# Patient Record
Sex: FEMALE | Race: OTHER | HISPANIC OR LATINO | Employment: UNEMPLOYED | URBAN - METROPOLITAN AREA
[De-identification: names, ages, dates, MRNs, and addresses within clinical notes are randomized per-mention and may not be internally consistent; named-entity substitution may affect disease eponyms.]

---

## 2017-12-22 ENCOUNTER — APPOINTMENT (OUTPATIENT)
Dept: LAB | Facility: HOSPITAL | Age: 37
End: 2017-12-22

## 2017-12-22 ENCOUNTER — GENERIC CONVERSION - ENCOUNTER (OUTPATIENT)
Dept: OTHER | Facility: OTHER | Age: 37
End: 2017-12-22

## 2017-12-22 ENCOUNTER — ALLSCRIPTS OFFICE VISIT (OUTPATIENT)
Dept: OTHER | Facility: OTHER | Age: 37
End: 2017-12-22

## 2017-12-22 DIAGNOSIS — Z34.91 ENCOUNTER FOR SUPERVISION OF NORMAL PREGNANCY IN FIRST TRIMESTER: ICD-10-CM

## 2017-12-22 DIAGNOSIS — O09.519 SUPERVISION OF ELDERLY PRIMIGRAVIDA: ICD-10-CM

## 2017-12-22 LAB
ABO GROUP BLD: NORMAL
BASOPHILS # BLD AUTO: 0.04 THOUSANDS/ΜL (ref 0–0.1)
BASOPHILS NFR BLD AUTO: 1 % (ref 0–1)
BILIRUB UR QL STRIP: NEGATIVE
BLD GP AB SCN SERPL QL: NEGATIVE
CLARITY UR: CLEAR
COLOR UR: NORMAL
EOSINOPHIL # BLD AUTO: 0.19 THOUSAND/ΜL (ref 0–0.61)
EOSINOPHIL NFR BLD AUTO: 3 % (ref 0–6)
ERYTHROCYTE [DISTWIDTH] IN BLOOD BY AUTOMATED COUNT: 13.1 % (ref 11.6–15.1)
GLUCOSE UR STRIP-MCNC: NEGATIVE MG/DL
HCT VFR BLD AUTO: 38.5 % (ref 34.8–46.1)
HGB BLD-MCNC: 13.5 G/DL (ref 11.5–15.4)
HGB UR QL STRIP.AUTO: NEGATIVE
KETONES UR STRIP-MCNC: NEGATIVE MG/DL
LEUKOCYTE ESTERASE UR QL STRIP: NEGATIVE
LYMPHOCYTES # BLD AUTO: 1.63 THOUSANDS/ΜL (ref 0.6–4.47)
LYMPHOCYTES NFR BLD AUTO: 21 % (ref 14–44)
MCH RBC QN AUTO: 31.8 PG (ref 26.8–34.3)
MCHC RBC AUTO-ENTMCNC: 35.1 G/DL (ref 31.4–37.4)
MCV RBC AUTO: 91 FL (ref 82–98)
MONOCYTES # BLD AUTO: 0.63 THOUSAND/ΜL (ref 0.17–1.22)
MONOCYTES NFR BLD AUTO: 8 % (ref 4–12)
NEUTROPHILS # BLD AUTO: 5.15 THOUSANDS/ΜL (ref 1.85–7.62)
NEUTS SEG NFR BLD AUTO: 67 % (ref 43–75)
NITRITE UR QL STRIP: NEGATIVE
NRBC BLD AUTO-RTO: 0 /100 WBCS
PH UR STRIP.AUTO: 5.5 [PH] (ref 4.5–8)
PLATELET # BLD AUTO: 237 THOUSANDS/UL (ref 149–390)
PMV BLD AUTO: 11.1 FL (ref 8.9–12.7)
PROT UR STRIP-MCNC: NEGATIVE MG/DL
RBC # BLD AUTO: 4.24 MILLION/UL (ref 3.81–5.12)
RH BLD: NEGATIVE
SP GR UR STRIP.AUTO: <=1.005 (ref 1–1.03)
SPECIMEN EXPIRATION DATE: NORMAL
UROBILINOGEN UR QL STRIP.AUTO: 0.2 E.U./DL
WBC # BLD AUTO: 7.64 THOUSAND/UL (ref 4.31–10.16)

## 2017-12-22 PROCEDURE — 80081 OBSTETRIC PANEL INC HIV TSTG: CPT

## 2017-12-22 PROCEDURE — 81003 URINALYSIS AUTO W/O SCOPE: CPT

## 2017-12-22 PROCEDURE — 87086 URINE CULTURE/COLONY COUNT: CPT

## 2017-12-22 PROCEDURE — 36415 COLL VENOUS BLD VENIPUNCTURE: CPT

## 2017-12-23 LAB
BACTERIA UR CULT: NORMAL
HBV SURFACE AG SER QL: NORMAL
RUBV IGG SERPL IA-ACNC: >175 IU/ML

## 2017-12-26 LAB — RPR SER QL: NORMAL

## 2017-12-27 LAB — HIV 1+2 AB+HIV1 P24 AG SERPL QL IA: NORMAL

## 2017-12-28 ENCOUNTER — LAB REQUISITION (OUTPATIENT)
Dept: LAB | Facility: HOSPITAL | Age: 37
End: 2017-12-28

## 2017-12-28 ENCOUNTER — GENERIC CONVERSION - ENCOUNTER (OUTPATIENT)
Dept: OTHER | Facility: OTHER | Age: 37
End: 2017-12-28

## 2017-12-28 DIAGNOSIS — Z11.3 ENCOUNTER FOR SCREENING FOR INFECTIONS WITH PREDOMINANTLY SEXUAL MODE OF TRANSMISSION: ICD-10-CM

## 2017-12-28 DIAGNOSIS — Z01.419 ENCOUNTER FOR GYNECOLOGICAL EXAMINATION WITHOUT ABNORMAL FINDING: ICD-10-CM

## 2017-12-28 PROCEDURE — 87624 HPV HI-RISK TYP POOLED RSLT: CPT | Performed by: OBSTETRICS & GYNECOLOGY

## 2017-12-28 PROCEDURE — 87591 N.GONORRHOEAE DNA AMP PROB: CPT | Performed by: OBSTETRICS & GYNECOLOGY

## 2017-12-28 PROCEDURE — 87491 CHLMYD TRACH DNA AMP PROBE: CPT | Performed by: OBSTETRICS & GYNECOLOGY

## 2017-12-28 PROCEDURE — G0145 SCR C/V CYTO,THINLAYER,RESCR: HCPCS | Performed by: OBSTETRICS & GYNECOLOGY

## 2018-01-02 LAB — HPV RRNA GENITAL QL NAA+PROBE: ABNORMAL

## 2018-01-03 LAB
CHLAMYDIA DNA CVX QL NAA+PROBE: NORMAL
N GONORRHOEA DNA GENITAL QL NAA+PROBE: NORMAL

## 2018-01-04 LAB
LAB AP GYN PRIMARY INTERPRETATION: NORMAL
LAB AP LMP: NORMAL
Lab: NORMAL
PATH INTERP SPEC-IMP: NORMAL

## 2018-01-08 ENCOUNTER — GENERIC CONVERSION - ENCOUNTER (OUTPATIENT)
Dept: OTHER | Facility: OTHER | Age: 38
End: 2018-01-08

## 2018-01-08 ENCOUNTER — ALLSCRIPTS OFFICE VISIT (OUTPATIENT)
Dept: PERINATAL CARE | Facility: CLINIC | Age: 38
End: 2018-01-08
Payer: COMMERCIAL

## 2018-01-08 PROCEDURE — 76801 OB US < 14 WKS SINGLE FETUS: CPT | Performed by: OBSTETRICS & GYNECOLOGY

## 2018-01-08 PROCEDURE — 76813 OB US NUCHAL MEAS 1 GEST: CPT | Performed by: OBSTETRICS & GYNECOLOGY

## 2018-01-10 ENCOUNTER — GENERIC CONVERSION - ENCOUNTER (OUTPATIENT)
Dept: OTHER | Facility: OTHER | Age: 38
End: 2018-01-10

## 2018-01-10 PROCEDURE — 88305 TISSUE EXAM BY PATHOLOGIST: CPT | Performed by: OBSTETRICS & GYNECOLOGY

## 2018-01-11 ENCOUNTER — LAB REQUISITION (OUTPATIENT)
Dept: LAB | Facility: HOSPITAL | Age: 38
End: 2018-01-11
Payer: COMMERCIAL

## 2018-01-11 DIAGNOSIS — O09.519 SUPERVISION OF ELDERLY PRIMIGRAVIDA: ICD-10-CM

## 2018-01-23 VITALS
BODY MASS INDEX: 24.64 KG/M2 | DIASTOLIC BLOOD PRESSURE: 74 MMHG | SYSTOLIC BLOOD PRESSURE: 117 MMHG | HEIGHT: 71 IN | WEIGHT: 176 LBS

## 2018-01-23 NOTE — MISCELLANEOUS
Reason For Visit  Reason For Visit Free Text Note Form: SW SPOKE WITH 38 Y/O- M- G1- BILINGUAL WOMAN TO ADDRESS DEPRESSION SCORE OF 20 WITH NO HARM TO SELF  PATIENT REPORTED SHE RELOCATED FROM [place], [facility]  RESIDES WITH  AND IS MISSING HER FAMILY  PREGNANCY WAS NOT REALLY PLAN BUT BOTH VERY EXCITED  PATIENT REPORTED SHE FEES OVERWHELMED BY NEW HOUSE, RELOCATION, NEW PLACE, IMMIGRATION PROCESS, HAS NO FAMILY HERE  PATIENT'S SISTER HERE FOR A MONTH  PATIENT DENIES ANY PRIOR HISTORY OF DEPRESSION  NOT SI / HI  SUPPORTIVE COUNSELING GIVEN  NO MENTAL HEALTH REFERRAL AT THIS TIME  PATIENT WAS ENCOURAGED TO CALL SW AT ANY TIME NEEDED  NO OTHER CONCERN AT THIS TIME  Active Problems    1  Encounter for pregnancy related examination in first trimester (V22 1) (Z34 91)   2  Need for influenza vaccination (V04 81) (Z23)    Current Meds   1  PNV Prenatal Plus Multivitamin 27-1 MG Oral Tablet; Therapy: (Recorded:[id number]) to Recorded    Allergies    1  No Known Drug Allergies    2  Dust   3   No Known Food Allergies    Signatures   Electronically signed by : JOHN Ford; Dec 22 2017 12:34PM EST                       (Author)

## 2018-01-24 ENCOUNTER — OB ABSTRACT (OUTPATIENT)
Dept: OBGYN CLINIC | Facility: HOSPITAL | Age: 38
End: 2018-01-24

## 2018-01-24 ENCOUNTER — OFFICE VISIT (OUTPATIENT)
Dept: OBGYN CLINIC | Facility: HOSPITAL | Age: 38
End: 2018-01-24
Payer: COMMERCIAL

## 2018-01-24 VITALS
WEIGHT: 173.6 LBS | BODY MASS INDEX: 24.21 KG/M2 | DIASTOLIC BLOOD PRESSURE: 74 MMHG | SYSTOLIC BLOOD PRESSURE: 108 MMHG | HEART RATE: 82 BPM

## 2018-01-24 VITALS
BODY MASS INDEX: 24.22 KG/M2 | SYSTOLIC BLOOD PRESSURE: 123 MMHG | DIASTOLIC BLOOD PRESSURE: 68 MMHG | HEIGHT: 71 IN | WEIGHT: 173 LBS

## 2018-01-24 VITALS
DIASTOLIC BLOOD PRESSURE: 74 MMHG | BODY MASS INDEX: 24.5 KG/M2 | SYSTOLIC BLOOD PRESSURE: 122 MMHG | WEIGHT: 175 LBS | HEIGHT: 71 IN | HEART RATE: 77 BPM

## 2018-01-24 VITALS
HEART RATE: 89 BPM | HEIGHT: 71 IN | WEIGHT: 173.8 LBS | DIASTOLIC BLOOD PRESSURE: 72 MMHG | BODY MASS INDEX: 24.33 KG/M2 | SYSTOLIC BLOOD PRESSURE: 109 MMHG

## 2018-01-24 DIAGNOSIS — Z34.02 ENCOUNTER FOR PRENATAL CARE IN SECOND TRIMESTER OF FIRST PREGNANCY: Primary | ICD-10-CM

## 2018-01-24 PROBLEM — O09.519 ADVANCED MATERNAL AGE, PRIMIGRAVIDA, ANTEPARTUM: Status: ACTIVE | Noted: 2018-01-24

## 2018-01-24 PROBLEM — O26.899 RH NEGATIVE, ANTEPARTUM: Status: ACTIVE | Noted: 2018-01-24

## 2018-01-24 PROBLEM — O21.9 NAUSEA AND VOMITING DURING PREGNANCY: Status: ACTIVE | Noted: 2018-01-24

## 2018-01-24 PROBLEM — Z67.91 RH NEGATIVE, ANTEPARTUM: Status: ACTIVE | Noted: 2018-01-24

## 2018-01-24 PROCEDURE — 99212 OFFICE O/P EST SF 10 MIN: CPT | Performed by: OBSTETRICS & GYNECOLOGY

## 2018-01-24 RX ORDER — PRENATAL WITH FERROUS FUM AND FOLIC ACID 3080; 920; 120; 400; 22; 1.84; 3; 20; 10; 1; 12; 200; 27; 25; 2 [IU]/1; [IU]/1; MG/1; [IU]/1; MG/1; MG/1; MG/1; MG/1; MG/1; MG/1; UG/1; MG/1; MG/1; MG/1; MG/1
TABLET ORAL
COMMUNITY
End: 2019-09-09

## 2018-01-24 RX ORDER — DIPHENHYDRAMINE HYDROCHLORIDE 25 MG/1
25 CAPSULE ORAL DAILY
COMMUNITY
End: 2018-01-24 | Stop reason: SDUPTHER

## 2018-01-24 RX ORDER — DIPHENHYDRAMINE HYDROCHLORIDE 25 MG/1
CAPSULE ORAL
COMMUNITY
Start: 2017-12-28

## 2018-01-24 RX ORDER — PRENATAL WITH FERROUS FUM AND FOLIC ACID 3080; 920; 120; 400; 22; 1.84; 3; 20; 10; 1; 12; 200; 27; 25; 2 [IU]/1; [IU]/1; MG/1; [IU]/1; MG/1; MG/1; MG/1; MG/1; MG/1; MG/1; UG/1; MG/1; MG/1; MG/1; MG/1
TABLET ORAL
COMMUNITY
End: 2018-01-24 | Stop reason: SDUPTHER

## 2018-01-24 NOTE — PROGRESS NOTES
75-year-old  at 15 weeks and 0 days today here for prenatal visit, she has been evaluated for about culture null H at the  center, she was offered the option of  Maternal serum alpha-fetoprotein for detection of 2 defects since cell free DNA cannot screen for these problems, the patient also requesting SMA and cystic fibrosis screening  Patient states that now she has insurance and it will be covered  Also as part of her workup for a mat maternal age early Glucola is recommended for which is slip was provided  patient denies any vaginal bleeding, loss of fluid, contractions  Patient is from [place] and she mentions that she will like to have use 1 provider during her pregnancy I explained that here in the [place] that is difficult to offer since larger groups are the norm, but recommend that Dr Arlette Ernandez  As an option since he has a solo OBGYN  Patient to information and will consider switching to his practice  /74 (BP Location: Left arm, Patient Position: Sitting, Cuff Size: Standard)   Pulse 82   Wt 78 7 kg (173 lb 9 6 oz)   LMP 10/11/2017   BMI 24 21 kg/m²       Fetal heart rate: 142  By abdominal ultrasound  Active fetus        Plan:  Follow-up SMA and cystic fibrosis screening,   return to clinic in 4 weeks

## 2018-01-25 ENCOUNTER — TRANSCRIBE ORDERS (OUTPATIENT)
Dept: LAB | Facility: HOSPITAL | Age: 38
End: 2018-01-25

## 2018-01-25 ENCOUNTER — APPOINTMENT (OUTPATIENT)
Dept: LAB | Facility: HOSPITAL | Age: 38
End: 2018-01-25
Payer: COMMERCIAL

## 2018-01-25 DIAGNOSIS — Z34.02 ENCOUNTER FOR PRENATAL CARE IN SECOND TRIMESTER OF FIRST PREGNANCY: ICD-10-CM

## 2018-01-25 DIAGNOSIS — Z34.02 ENCOUNTER FOR SUPERVISION OF NORMAL FIRST PREGNANCY IN SECOND TRIMESTER: Primary | ICD-10-CM

## 2018-01-25 LAB — GLUCOSE 1H P 100 G GLC PO SERPL-MCNC: 96 MG/DL (ref 65–179)

## 2018-01-25 PROCEDURE — 81401 MOPATH PROCEDURE LEVEL 2: CPT

## 2018-01-25 PROCEDURE — 81243 FMR1 GEN ALY DETC ABNL ALLEL: CPT

## 2018-01-25 PROCEDURE — 36415 COLL VENOUS BLD VENIPUNCTURE: CPT | Performed by: OBSTETRICS & GYNECOLOGY

## 2018-01-25 PROCEDURE — 81220 CFTR GENE COM VARIANTS: CPT | Performed by: OBSTETRICS & GYNECOLOGY

## 2018-01-25 NOTE — PROGRESS NOTES
I have reviewed the notes, assessments, and/or procedures performed by Daisy Oconnell, I concur with her/his documentation of Lexx Miranda

## 2018-02-05 LAB — MISCELLANEOUS LAB TEST RESULT: NORMAL

## 2018-02-06 ENCOUNTER — OB ABSTRACT (OUTPATIENT)
Dept: OBGYN CLINIC | Facility: CLINIC | Age: 38
End: 2018-02-06

## 2018-03-01 ENCOUNTER — ROUTINE PRENATAL (OUTPATIENT)
Dept: PERINATAL CARE | Facility: CLINIC | Age: 38
End: 2018-03-01
Payer: COMMERCIAL

## 2018-03-01 VITALS
HEART RATE: 85 BPM | BODY MASS INDEX: 25.06 KG/M2 | SYSTOLIC BLOOD PRESSURE: 94 MMHG | HEIGHT: 71 IN | DIASTOLIC BLOOD PRESSURE: 66 MMHG | WEIGHT: 179 LBS

## 2018-03-01 DIAGNOSIS — O09.512 ELDERLY PRIMIGRAVIDA IN SECOND TRIMESTER: ICD-10-CM

## 2018-03-01 DIAGNOSIS — Z36.3 ENCOUNTER FOR ANTENATAL SCREENING FOR MALFORMATIONS: Primary | ICD-10-CM

## 2018-03-01 DIAGNOSIS — Z34.02 ENCOUNTER FOR PRENATAL CARE OF FIRST PREGNANCY, SECOND TRIMESTER: ICD-10-CM

## 2018-03-01 DIAGNOSIS — Z36.86 ENCOUNTER FOR ANTENATAL SCREENING FOR CERVICAL LENGTH: ICD-10-CM

## 2018-03-01 DIAGNOSIS — Z3A.20 20 WEEKS GESTATION OF PREGNANCY: ICD-10-CM

## 2018-03-01 PROCEDURE — 76817 TRANSVAGINAL US OBSTETRIC: CPT | Performed by: OBSTETRICS & GYNECOLOGY

## 2018-03-01 PROCEDURE — 76811 OB US DETAILED SNGL FETUS: CPT | Performed by: OBSTETRICS & GYNECOLOGY

## 2018-03-01 PROCEDURE — 99212 OFFICE O/P EST SF 10 MIN: CPT | Performed by: OBSTETRICS & GYNECOLOGY

## 2018-03-01 RX ORDER — FOLIC ACID 1 MG/1
1 TABLET ORAL DAILY
COMMUNITY
End: 2019-08-19 | Stop reason: HOSPADM

## 2018-03-01 NOTE — PATIENT INSTRUCTIONS
Please return in 8-12 weeks to check fetal growth  Nonstress testing should begin at 36 weeks and continue until delivery

## 2018-03-01 NOTE — PROGRESS NOTES
RANJIT Barrios 51: Ms Steven Castillo was seen today at 20w1d for anatomic survey and cervical length screening ultrasound  See ultrasound report under "OB Procedures" tab  Please don't hesitate to contact our office with any concerns or questions    Madison Sanches MD

## 2018-03-07 NOTE — PROGRESS NOTES
Education  Baby & Me Education 1st Trimester:   First Trimester Education provided:   benefits of breastfeeding, importance of exclusive breastfeeding, early initiation of breastfeeding, exclusive breastfeeding for the first 6 months and Pregnancy Essentials Reference Guide given   The patient is planning on breastfeeding        Signatures   Electronically signed by : Freida Tolentino RN; Dec 22 2017 12:17PM EST                       (Author)

## 2018-03-15 ENCOUNTER — TRANSITIONAL CARE MANAGEMENT (OUTPATIENT)
Dept: PERINATAL CARE | Facility: CLINIC | Age: 38
End: 2018-03-15

## 2018-03-16 ENCOUNTER — TELEPHONE (OUTPATIENT)
Dept: PERINATAL CARE | Facility: CLINIC | Age: 38
End: 2018-03-16

## 2018-05-04 ENCOUNTER — TRANSCRIBE ORDERS (OUTPATIENT)
Dept: LAB | Facility: HOSPITAL | Age: 38
End: 2018-05-04

## 2018-05-04 ENCOUNTER — LAB (OUTPATIENT)
Dept: LAB | Facility: HOSPITAL | Age: 38
End: 2018-05-04
Attending: OBSTETRICS & GYNECOLOGY
Payer: COMMERCIAL

## 2018-05-04 DIAGNOSIS — Z36.89 SCREENING FOR PREGNANCY-ASSOCIATED PLASMA PROTEIN A: ICD-10-CM

## 2018-05-04 DIAGNOSIS — Z36.89 SCREENING FOR PREGNANCY-ASSOCIATED PLASMA PROTEIN A: Primary | ICD-10-CM

## 2018-05-04 DIAGNOSIS — O09.519 SUPERVISION OF ELDERLY PRIMIGRAVIDA: ICD-10-CM

## 2018-05-04 LAB
BASOPHILS # BLD AUTO: 0.02 THOUSANDS/ΜL (ref 0–0.1)
BASOPHILS NFR BLD AUTO: 0 % (ref 0–1)
EOSINOPHIL # BLD AUTO: 0.12 THOUSAND/ΜL (ref 0–0.61)
EOSINOPHIL NFR BLD AUTO: 2 % (ref 0–6)
ERYTHROCYTE [DISTWIDTH] IN BLOOD BY AUTOMATED COUNT: 13.3 % (ref 11.6–15.1)
GLUCOSE 1H P 100 G GLC PO SERPL-MCNC: 87 MG/DL (ref 65–179)
HCT VFR BLD AUTO: 36.1 % (ref 34.8–46.1)
HGB BLD-MCNC: 11.9 G/DL (ref 11.5–15.4)
LYMPHOCYTES # BLD AUTO: 1.37 THOUSANDS/ΜL (ref 0.6–4.47)
LYMPHOCYTES NFR BLD AUTO: 17 % (ref 14–44)
MCH RBC QN AUTO: 30.8 PG (ref 26.8–34.3)
MCHC RBC AUTO-ENTMCNC: 33 G/DL (ref 31.4–37.4)
MCV RBC AUTO: 94 FL (ref 82–98)
MONOCYTES # BLD AUTO: 0.68 THOUSAND/ΜL (ref 0.17–1.22)
MONOCYTES NFR BLD AUTO: 8 % (ref 4–12)
NEUTROPHILS # BLD AUTO: 6.02 THOUSANDS/ΜL (ref 1.85–7.62)
NEUTS SEG NFR BLD AUTO: 73 % (ref 43–75)
NRBC BLD AUTO-RTO: 0 /100 WBCS
PLATELET # BLD AUTO: 251 THOUSANDS/UL (ref 149–390)
PMV BLD AUTO: 10 FL (ref 8.9–12.7)
RBC # BLD AUTO: 3.86 MILLION/UL (ref 3.81–5.12)
WBC # BLD AUTO: 8.23 THOUSAND/UL (ref 4.31–10.16)

## 2018-05-04 PROCEDURE — 81220 CFTR GENE COM VARIANTS: CPT

## 2018-05-04 PROCEDURE — 85025 COMPLETE CBC W/AUTO DIFF WBC: CPT

## 2018-05-04 PROCEDURE — 81401 MOPATH PROCEDURE LEVEL 2: CPT

## 2018-05-04 PROCEDURE — 36415 COLL VENOUS BLD VENIPUNCTURE: CPT

## 2018-05-11 LAB
CF COMMENT: NORMAL
CFTR MUT ANL BLD/T: NORMAL

## 2018-05-15 LAB
ANNOTATION COMMENT IMP: NORMAL
ETHNIC BACKGROUND STATED: NORMAL
REF LAB TEST METHOD: NORMAL
SL AMB CARRIER DETECTION RATE: NORMAL
SL AMB CLIENT SPECIMEN ID: NORMAL
SL AMB CLINICAL DATA: NORMAL
SL AMB DISCLAIMER: NORMAL
SL AMB ELECTONICALLY SIGNED BY: NORMAL
SL AMB GENETIC COUNSELOR: NORMAL
SL AMB REFERENCES: NORMAL
SL AMB SPECIMEN(S) RECEIVED: NORMAL
SMN1 GENE MUT ANL BLD/T: NORMAL
SMN1 GENE MUT ANL BLD/T: NORMAL
SPECIMEN SOURCE: NORMAL

## 2018-05-18 ENCOUNTER — TELEPHONE (OUTPATIENT)
Dept: PERINATAL CARE | Facility: CLINIC | Age: 38
End: 2018-05-18

## 2018-05-18 NOTE — TELEPHONE ENCOUNTER
Called patient with CF and SMA carrier screening test results  Both negative  Discussion was somewhat difficult given the language barrier but patient expressed understanding that the results were good

## 2018-05-31 PROCEDURE — 87653 STREP B DNA AMP PROBE: CPT | Performed by: OBSTETRICS & GYNECOLOGY

## 2018-06-01 ENCOUNTER — LAB REQUISITION (OUTPATIENT)
Dept: LAB | Facility: HOSPITAL | Age: 38
End: 2018-06-01
Payer: COMMERCIAL

## 2018-06-01 DIAGNOSIS — Z36.89 ENCOUNTER FOR OTHER SPECIFIED ANTENATAL SCREENING: ICD-10-CM

## 2018-06-01 DIAGNOSIS — Z36.85 ENCOUNTER FOR ANTENATAL SCREENING FOR STREPTOCOCCUS B: ICD-10-CM

## 2018-06-03 LAB — GP B STREP DNA SPEC QL NAA+PROBE: NORMAL

## 2018-06-15 ENCOUNTER — ANESTHESIA EVENT (INPATIENT)
Dept: LABOR AND DELIVERY | Facility: HOSPITAL | Age: 38
End: 2018-06-15
Payer: COMMERCIAL

## 2018-06-15 ENCOUNTER — ANESTHESIA (INPATIENT)
Dept: LABOR AND DELIVERY | Facility: HOSPITAL | Age: 38
End: 2018-06-15
Payer: COMMERCIAL

## 2018-06-15 ENCOUNTER — HOSPITAL ENCOUNTER (INPATIENT)
Facility: HOSPITAL | Age: 38
LOS: 3 days | Discharge: HOME | End: 2018-06-18
Attending: OBSTETRICS & GYNECOLOGY | Admitting: OBSTETRICS & GYNECOLOGY
Payer: COMMERCIAL

## 2018-06-15 DIAGNOSIS — Z3A.35 35 WEEKS GESTATION OF PREGNANCY: Primary | ICD-10-CM

## 2018-06-15 LAB
ABO GROUP BLD: NORMAL
ALBUMIN SERPL BCP-MCNC: 2.5 G/DL (ref 3.5–5)
ALP SERPL-CCNC: 110 U/L (ref 46–116)
ALT SERPL W P-5'-P-CCNC: 37 U/L (ref 12–78)
ANION GAP SERPL CALCULATED.3IONS-SCNC: 12 MMOL/L (ref 4–13)
AST SERPL W P-5'-P-CCNC: 27 U/L (ref 5–45)
BASOPHILS # BLD AUTO: 0.05 THOUSANDS/ΜL (ref 0–0.1)
BASOPHILS NFR BLD AUTO: 0 % (ref 0–1)
BILIRUB SERPL-MCNC: 0.27 MG/DL (ref 0.2–1)
BLD GP AB SCN SERPL QL: POSITIVE
BLOOD GROUP ANTIBODIES SERPL: NORMAL
BUN SERPL-MCNC: 7 MG/DL (ref 5–25)
CALCIUM SERPL-MCNC: 8.5 MG/DL (ref 8.3–10.1)
CHLORIDE SERPL-SCNC: 107 MMOL/L (ref 100–108)
CO2 SERPL-SCNC: 20 MMOL/L (ref 21–32)
CREAT SERPL-MCNC: 0.64 MG/DL (ref 0.6–1.3)
EOSINOPHIL # BLD AUTO: 0.01 THOUSAND/ΜL (ref 0–0.61)
EOSINOPHIL NFR BLD AUTO: 0 % (ref 0–6)
ERYTHROCYTE [DISTWIDTH] IN BLOOD BY AUTOMATED COUNT: 13 % (ref 11.6–15.1)
GFR SERPL CREATININE-BSD FRML MDRD: 112 ML/MIN/1.73SQ M
GLUCOSE SERPL-MCNC: 131 MG/DL (ref 65–140)
HCT VFR BLD AUTO: 39.2 % (ref 34.8–46.1)
HGB BLD-MCNC: 12.9 G/DL (ref 11.5–15.4)
IMM GRANULOCYTES # BLD AUTO: 0.08 THOUSAND/UL (ref 0–0.2)
IMM GRANULOCYTES NFR BLD AUTO: 1 % (ref 0–2)
LYMPHOCYTES # BLD AUTO: 1.13 THOUSANDS/ΜL (ref 0.6–4.47)
LYMPHOCYTES NFR BLD AUTO: 7 % (ref 14–44)
MCH RBC QN AUTO: 30.1 PG (ref 26.8–34.3)
MCHC RBC AUTO-ENTMCNC: 32.9 G/DL (ref 31.4–37.4)
MCV RBC AUTO: 91 FL (ref 82–98)
MONOCYTES # BLD AUTO: 0.62 THOUSAND/ΜL (ref 0.17–1.22)
MONOCYTES NFR BLD AUTO: 4 % (ref 4–12)
NEUTROPHILS # BLD AUTO: 14.68 THOUSANDS/ΜL (ref 1.85–7.62)
NEUTS SEG NFR BLD AUTO: 88 % (ref 43–75)
NRBC BLD AUTO-RTO: 0 /100 WBCS
PLATELET # BLD AUTO: 269 THOUSANDS/UL (ref 149–390)
PMV BLD AUTO: 10 FL (ref 8.9–12.7)
POTASSIUM SERPL-SCNC: 3.8 MMOL/L (ref 3.5–5.3)
PROT SERPL-MCNC: 6.7 G/DL (ref 6.4–8.2)
RBC # BLD AUTO: 4.29 MILLION/UL (ref 3.81–5.12)
RH BLD: NEGATIVE
SODIUM SERPL-SCNC: 139 MMOL/L (ref 136–145)
SPECIMEN EXPIRATION DATE: NORMAL
WBC # BLD AUTO: 16.57 THOUSAND/UL (ref 4.31–10.16)

## 2018-06-15 PROCEDURE — 86901 BLOOD TYPING SEROLOGIC RH(D): CPT | Performed by: OBSTETRICS & GYNECOLOGY

## 2018-06-15 PROCEDURE — 99205 OFFICE O/P NEW HI 60 MIN: CPT

## 2018-06-15 PROCEDURE — 86850 RBC ANTIBODY SCREEN: CPT | Performed by: OBSTETRICS & GYNECOLOGY

## 2018-06-15 PROCEDURE — 86592 SYPHILIS TEST NON-TREP QUAL: CPT | Performed by: OBSTETRICS & GYNECOLOGY

## 2018-06-15 PROCEDURE — 80053 COMPREHEN METABOLIC PANEL: CPT | Performed by: OBSTETRICS & GYNECOLOGY

## 2018-06-15 PROCEDURE — 86870 RBC ANTIBODY IDENTIFICATION: CPT | Performed by: OBSTETRICS & GYNECOLOGY

## 2018-06-15 PROCEDURE — 85025 COMPLETE CBC W/AUTO DIFF WBC: CPT | Performed by: OBSTETRICS & GYNECOLOGY

## 2018-06-15 PROCEDURE — 86900 BLOOD TYPING SEROLOGIC ABO: CPT | Performed by: OBSTETRICS & GYNECOLOGY

## 2018-06-15 RX ORDER — SODIUM CHLORIDE, SODIUM LACTATE, POTASSIUM CHLORIDE, CALCIUM CHLORIDE 600; 310; 30; 20 MG/100ML; MG/100ML; MG/100ML; MG/100ML
125 INJECTION, SOLUTION INTRAVENOUS CONTINUOUS
Status: DISCONTINUED | OUTPATIENT
Start: 2018-06-15 | End: 2018-06-18 | Stop reason: HOSPADM

## 2018-06-15 RX ORDER — SODIUM CHLORIDE 9 MG/ML
125 INJECTION, SOLUTION INTRAVENOUS CONTINUOUS
Status: DISCONTINUED | OUTPATIENT
Start: 2018-06-15 | End: 2018-06-16

## 2018-06-15 RX ORDER — PROMETHAZINE HYDROCHLORIDE 25 MG/ML
25 INJECTION, SOLUTION INTRAMUSCULAR; INTRAVENOUS ONCE
Status: COMPLETED | OUTPATIENT
Start: 2018-06-15 | End: 2018-06-15

## 2018-06-15 RX ORDER — BUTORPHANOL TARTRATE 1 MG/ML
2 INJECTION, SOLUTION INTRAMUSCULAR; INTRAVENOUS ONCE
Status: COMPLETED | OUTPATIENT
Start: 2018-06-15 | End: 2018-06-15

## 2018-06-15 RX ORDER — PROMETHAZINE HYDROCHLORIDE 25 MG/1
25 TABLET ORAL ONCE
Status: DISCONTINUED | OUTPATIENT
Start: 2018-06-15 | End: 2018-06-15

## 2018-06-15 RX ORDER — BETAMETHASONE SODIUM PHOSPHATE AND BETAMETHASONE ACETATE 3; 3 MG/ML; MG/ML
12 INJECTION, SUSPENSION INTRA-ARTICULAR; INTRALESIONAL; INTRAMUSCULAR; SOFT TISSUE EVERY 24 HOURS
Status: DISCONTINUED | OUTPATIENT
Start: 2018-06-15 | End: 2018-06-16

## 2018-06-15 RX ADMIN — SODIUM CHLORIDE, SODIUM LACTATE, POTASSIUM CHLORIDE, AND CALCIUM CHLORIDE 125 ML/HR: .6; .31; .03; .02 INJECTION, SOLUTION INTRAVENOUS at 19:45

## 2018-06-15 RX ADMIN — BETAMETHASONE SODIUM PHOSPHATE AND BETAMETHASONE ACETATE 12 MG: 3; 3 INJECTION, SUSPENSION INTRA-ARTICULAR; INTRALESIONAL; INTRAMUSCULAR at 16:28

## 2018-06-15 RX ADMIN — SODIUM CHLORIDE, SODIUM LACTATE, POTASSIUM CHLORIDE, AND CALCIUM CHLORIDE: .6; .31; .03; .02 INJECTION, SOLUTION INTRAVENOUS at 23:07

## 2018-06-15 RX ADMIN — BUTORPHANOL TARTRATE 2 MG: 1 INJECTION, SOLUTION INTRAMUSCULAR; INTRAVENOUS at 15:00

## 2018-06-15 RX ADMIN — SODIUM CHLORIDE 125 ML/HR: 0.9 INJECTION, SOLUTION INTRAVENOUS at 16:54

## 2018-06-15 RX ADMIN — PROMETHAZINE HYDROCHLORIDE 25 MG: 25 INJECTION INTRAMUSCULAR; INTRAVENOUS at 15:00

## 2018-06-15 NOTE — PROGRESS NOTES
Triage Note - OB  Abbie Rivera 34 y o  female MRN: [id number]  Unit/Bed#: LD Triage 1-01 Encounter: [id number]    OB TRIAGE NOTE  Abbie Rivera  [id number]  6/15/2018  3:31 PM  LD Triage 1/LD Triage 1-*    ASSESS:  34 y o  Bhupendra Irene 35w2d here labor workup    PLAN  #1  Rule out labor:   · Initial check: 3/100/-1, membranes palpable  · Two hour recheck: 4/100/-1  · Given slow change at 35w2d, will keep patient here for another 2 hour recheck  Will start course of BTM  D/w Dr Maximo Kline  ______________    SUBJECTIVE    ABISAI: Estimated Date of Delivery: 7/18/18    HPI Chronology:  34 y o  Bhupendra Irene 35w2d who presents for labor workup  She started having contractions at 0730 this morning which began intensifying around 0930  She reports that this is the first occurrence of painful contractions  She denies any other symptoms  She is GBS negative  No complications with this pregnancy aside from advanced maternal age  Contractions: Present  Leakage: Absent  Bleeding: Absent  Fetal Movement: Present  Pelvic pain: Present    Vitals:   Pulse 85   Temp (!) 97 1 °F (36 2 °C) (Tympanic)   Resp 18   LMP 10/11/2017   There is no height or weight on file to calculate BMI  Review of Systems   Constitutional: Negative  Respiratory: Negative  Cardiovascular: Negative  Gastrointestinal: Negative  Genitourinary: Positive for pelvic pain  Musculoskeletal: Negative  Physical Exam   Constitutional: She is oriented to person, place, and time  She appears well-developed and well-nourished  Cardiovascular: Normal rate, regular rhythm and normal heart sounds  Pulmonary/Chest: Effort normal and breath sounds normal    Abdominal: Soft  Bowel sounds are normal    Neurological: She is alert and oriented to person, place, and time  Vitals reviewed        FHT:  Baseline Rate: 135 bpm  Variability: Moderate 6-25 bpm  TOCO:   Contraction Frequency (minutes): irritable/ occasional  Contraction Quality: Moderate, Mild    Labs: No results found for this or any previous visit (from the past 24 hour(s))  Lab, Imaging and other studies: I have personally reviewed pertinent reports          Joe Palomo MD  6/15/2018  3:31 PM

## 2018-06-15 NOTE — PROGRESS NOTES
Still with some complaints of contractions but did rest after stadol and phenergan  FHT reassuring  Slow but obvious cervical changes - will continue to watch and anticipate admission   Start IV hydration and get admission labs  Supportive care

## 2018-06-16 LAB
BASE EXCESS BLDCOV CALC-SCNC: -3 MMOL/L (ref 1–9)
HCO3 BLDCOV-SCNC: 22.4 MMOL/L (ref 12.2–28.6)
OXYHGB MFR BLDCOV: 59.9 %
PCO2 BLDCOV: 41.5 MM HG (ref 27–43)
PH BLDCOV: 7.35 [PH] (ref 7.19–7.49)
PO2 BLDCOV: 23.7 MM HG (ref 15–45)
SAO2 % BLDCOV: 14.3 ML/DL

## 2018-06-16 PROCEDURE — 0HQ9XZZ REPAIR PERINEUM SKIN, EXTERNAL APPROACH: ICD-10-PCS | Performed by: OBSTETRICS & GYNECOLOGY

## 2018-06-16 PROCEDURE — 88307 TISSUE EXAM BY PATHOLOGIST: CPT | Performed by: PATHOLOGY

## 2018-06-16 PROCEDURE — 10D17Z9 MANUAL EXTRACTION OF PRODUCTS OF CONCEPTION, RETAINED, VIA NATURAL OR ARTIFICIAL OPENING: ICD-10-PCS | Performed by: OBSTETRICS & GYNECOLOGY

## 2018-06-16 PROCEDURE — 82805 BLOOD GASES W/O2 SATURATION: CPT | Performed by: OBSTETRICS & GYNECOLOGY

## 2018-06-16 RX ORDER — DOCUSATE SODIUM 100 MG/1
100 CAPSULE, LIQUID FILLED ORAL 2 TIMES DAILY
Status: DISCONTINUED | OUTPATIENT
Start: 2018-06-16 | End: 2018-06-18 | Stop reason: HOSPADM

## 2018-06-16 RX ORDER — IBUPROFEN 600 MG/1
600 TABLET ORAL EVERY 6 HOURS PRN
Status: DISCONTINUED | OUTPATIENT
Start: 2018-06-16 | End: 2018-06-18 | Stop reason: HOSPADM

## 2018-06-16 RX ORDER — SIMETHICONE 80 MG
80 TABLET,CHEWABLE ORAL 4 TIMES DAILY PRN
Status: DISCONTINUED | OUTPATIENT
Start: 2018-06-16 | End: 2018-06-18 | Stop reason: HOSPADM

## 2018-06-16 RX ORDER — OXYCODONE HYDROCHLORIDE AND ACETAMINOPHEN 5; 325 MG/1; MG/1
1 TABLET ORAL EVERY 4 HOURS PRN
Status: DISCONTINUED | OUTPATIENT
Start: 2018-06-16 | End: 2018-06-18 | Stop reason: HOSPADM

## 2018-06-16 RX ORDER — OXYTOCIN/RINGER'S LACTATE 30/500 ML
1-30 PLASTIC BAG, INJECTION (ML) INTRAVENOUS
Status: DISCONTINUED | OUTPATIENT
Start: 2018-06-16 | End: 2018-06-16

## 2018-06-16 RX ORDER — ONDANSETRON 2 MG/ML
4 INJECTION INTRAMUSCULAR; INTRAVENOUS EVERY 8 HOURS PRN
Status: DISCONTINUED | OUTPATIENT
Start: 2018-06-16 | End: 2018-06-18 | Stop reason: HOSPADM

## 2018-06-16 RX ORDER — DIAPER,BRIEF,INFANT-TODD,DISP
1 EACH MISCELLANEOUS AS NEEDED
Status: DISCONTINUED | OUTPATIENT
Start: 2018-06-16 | End: 2018-06-18 | Stop reason: HOSPADM

## 2018-06-16 RX ORDER — CALCIUM CARBONATE 200(500)MG
1000 TABLET,CHEWABLE ORAL DAILY PRN
Status: DISCONTINUED | OUTPATIENT
Start: 2018-06-16 | End: 2018-06-18 | Stop reason: HOSPADM

## 2018-06-16 RX ORDER — OXYCODONE HYDROCHLORIDE AND ACETAMINOPHEN 5; 325 MG/1; MG/1
2 TABLET ORAL EVERY 4 HOURS PRN
Status: DISCONTINUED | OUTPATIENT
Start: 2018-06-16 | End: 2018-06-18 | Stop reason: HOSPADM

## 2018-06-16 RX ORDER — ACETAMINOPHEN 325 MG/1
650 TABLET ORAL EVERY 6 HOURS PRN
Status: DISCONTINUED | OUTPATIENT
Start: 2018-06-16 | End: 2018-06-18 | Stop reason: HOSPADM

## 2018-06-16 RX ORDER — DIPHENHYDRAMINE HCL 25 MG
25 TABLET ORAL EVERY 6 HOURS PRN
Status: DISCONTINUED | OUTPATIENT
Start: 2018-06-16 | End: 2018-06-18 | Stop reason: HOSPADM

## 2018-06-16 RX ADMIN — SODIUM CHLORIDE, SODIUM LACTATE, POTASSIUM CHLORIDE, AND CALCIUM CHLORIDE 125 ML/HR: .6; .31; .03; .02 INJECTION, SOLUTION INTRAVENOUS at 00:37

## 2018-06-16 RX ADMIN — DOCUSATE SODIUM 100 MG: 100 CAPSULE, LIQUID FILLED ORAL at 18:26

## 2018-06-16 RX ADMIN — DOCUSATE SODIUM 100 MG: 100 CAPSULE, LIQUID FILLED ORAL at 09:36

## 2018-06-16 RX ADMIN — BENZOCAINE AND LEVOMENTHOL 1 APPLICATION: 200; 5 SPRAY TOPICAL at 16:41

## 2018-06-16 RX ADMIN — Medication 2 MILLI-UNITS/MIN: at 01:16

## 2018-06-16 RX ADMIN — CEFAZOLIN SODIUM 2000 MG: 10 INJECTION, POWDER, FOR SOLUTION INTRAVENOUS at 05:18

## 2018-06-16 RX ADMIN — IBUPROFEN 600 MG: 600 TABLET ORAL at 08:22

## 2018-06-16 NOTE — ANESTHESIA PREPROCEDURE EVALUATION
Review of Systems/Medical History  Patient summary reviewed  Chart reviewed      Cardiovascular   Pulmonary       GI/Hepatic            Endo/Other     GYN       Hematology   Musculoskeletal       Neurology   Psychology           Physical Exam    Airway    Mallampati score: II  TM Distance: >3 FB  Neck ROM: full     Dental   No notable dental hx     Cardiovascular  Rhythm: regular, Rate: normal,     Pulmonary  Breath sounds clear to auscultation,     Other Findings        Anesthesia Plan  ASA Score- 2     Anesthesia Type- epidural with ASA Monitors  Additional Monitors:   Airway Plan:         Plan Factors-    Induction- intravenous  Postoperative Plan- Plan for postoperative opioid use  Informed Consent- Anesthetic plan and risks discussed with patient and spouse  I personally reviewed this patient with the CRNA  Discussed and agreed on the Anesthesia Plan with the CRNA  Ibrahima Young

## 2018-06-16 NOTE — LACTATION NOTE
This note was copied from a baby's chart  Infant assisted to breast in cradle hold  Infant did latch well after several attempts  Reviewed signs of correct positioning and latch  Discussed signs of milk transfer  Encouraged continued feeding on cue,  Reviewed normal  infant feeding patterns in the first few days  Given admission breastfeeding pkt in [place] and [facility] [place]  Encouraged to call for additional assistance as needed

## 2018-06-16 NOTE — L&D DELIVERY NOTE
Delivery Summary - OB/GYN   Moy Asher 34 y o  female MRN: [id number]  Unit/Bed#: -01 Encounter: [id number]    Pre-delivery Diagnosis:   1  35w3d pregnancy  2   labor  3  GBS negative  4  AMA    Post-delivery Diagnosis: same, delivered    Attending: Jeanette Lee    Assistant(s): Azra    Procedure:     Anesthesia: epidural    Estimated Blood Loss:  350 mL    Specimens:   1  Arterial and venous cord gases  2  Cord blood  3  Segment of umbilical cord  4  Placenta to pathology     Complications:  None apparent    Findings:  1  Viable female  delivered on 18 at 0351 weighing 5lbs 8oz;  Apgar scores of 8 at one minute and 9 at five minutes  2  Spontaneous delivery of placenta with centrally inserted 3-vessel cord  3  Adherent placenta requiring manual extraction  4  1st degree perineal laceration, repaired with 3-0Vicryl       Disposition: Patient tolerated the procedure well and was recovering in labor and delivery room with family and  before being transferred to the post-partum floor  Patient was admitted in  labor  She received one dose of BTM and was managed expectantly  She SROMed and was later augmented with pitocin  She progressed to complete on  at 0330 and began pushing at 0340  Procedure Details     Description of procedure    After pushing for 11 minutes, on 18 at 0351 patient delivered a viable female , weighing 5 pounds 8 ounces, Apgars of 8 (1 min) and 9 (5 min)  The fetal vertex delivered spontaneously  There was no nuchal cord  The anterior shoulder delivered atraumatically with maternal expulsive forces and the assistance of downward traction  The posterior shoulder delivered with maternal expulsive forces and the assistance of upward traction  The remainder of the fetus delivered spontaneously  Upon delivery, the infant was placed on the mothers abdomen and the cord was clamped and cut   The infant was noted to cry spontaneously and was moving all extremities appropriately  There was no evidence for injury  Awaiting nurse resuscitators and NICU provider evaluated the  at bedside  Arterial and venous cord blood gases and cord blood was collected for analysis  These were promptly sent to the lab  In the immediate post-partum, 30 units of IV pitocin was administered and the uterus was noted to contract down well with massage and pitocin  The placenta was felt to be adherent and required an extensive manual extraction  It was delivered at [phone] and was noted to be ratty in appearance  It was sent to the lab for pathology  The vagina, cervix, and perineum were inspected and there was noted to be a first degree perineal laceration  Laceration Repair  Patient was comfortable with epidural at that time  A first degree perineal laceration was identified and required repair  Laceration was repaired with 3-0 Vicryl in standard fashion to reapproximate the laceration  Good hemostasis was confirmed at the conclusion of this procedure  At the conclusion of the delivery, all needle, sponge, and instrument counts were noted to be correct  Patient tolerated the procedure well and was allowed to recover in labor and delivery room with family and  before being transferred to the post-partum floor  Dr Sandip Mena was present and participated in all key portions of the case

## 2018-06-16 NOTE — ANESTHESIA POSTPROCEDURE EVALUATION
Post-Op Assessment Note      CV Status:  Stable    Mental Status:  Awake and alert    Hydration Status:  Stable    PONV Controlled:  None    Airway Patency:  Patent    Post Op Vitals Reviewed: Yes          Staff: Anesthesiologist     Post-op block assessment: catheter intact and no complications        /69 (06/16/18 0500)    Temp      Pulse     Resp      SpO2

## 2018-06-16 NOTE — DISCHARGE SUMMARY
Discharge Summary - OB/GYN  Octavia Noland 34 y o  female MRN: [id number]  Unit/Bed#: -01 Encounter: [id number]    Admission Date: 6/15/2018     Discharge Date: 18    Attending: Kirstie Jose MD    Principal Diagnosis: Pregnancy at 35w3d    Secondary Diagnosis:  labor    Procedures: spontaneous vaginal delivery    Anesthesia: epidural    Complications: none apparent    Patient was admitted for  labor  She received a partial course of BTM and was managed expectantly  She SROMed for clears fluid and was later augmented with pitocin  She progressed quickly after augmentation to complete and had an uncomplicated  and postpartum course  She was urinating, ambulating, tolerating PO and passing flatus  Her pain was well controlled with oral analgesics  She was discharged home on postpartum day #2  Condition at discharge: stable     Discharge instructions/Information to patient and family:   See after visit summary for information provided to patient and family  Provisions for Follow-Up Care:  See after visit summary for information related to follow-up care and any pertinent home health orders  Disposition: See After Visit Summary for discharge disposition information  Planned Readmission: No    Discharge Medications: For a complete list of the patient's medications, please refer to her med rec        Godwin Horan MD  OBGYN, PGY-1  2018 6:22 AM

## 2018-06-16 NOTE — PLAN OF CARE
BIRTH - VAGINAL/ SECTION     Fetal and maternal status remain reassuring during the birth process [de-identified]     Emotionally satisfying birthing experience for mother/fetus [address] Discharge to home or other facility with appropriate resources Progressing        INFECTION - ADULT     Absence or prevention of progression during hospitalization Progressing     Absence of fever/infection during neutropenic period Progressing        Knowledge Deficit     Patient/family/caregiver demonstrates understanding of disease process, treatment plan, medications, and discharge instructions Progressing        PAIN - ADULT     Verbalizes/displays adequate comfort level or baseline comfort level Progressing        SAFETY ADULT     Patient will remain free of falls Progressing     Maintain or return to baseline ADL function Progressing     Maintain or return mobility status to optimal level Progressing

## 2018-06-16 NOTE — LACTATION NOTE
This note was copied from a baby's chart  Mom C/O infant sleepy and doesn't; want to eat  Demonstrated alerting techniques  Infant assisted to left breast in cradle hold  Good latch with minimal assist  Infant does need stimulation to continue to suck  Mom needs encouragement to do this

## 2018-06-17 LAB
ABO GROUP BLD: NORMAL
BLD GP AB SCN SERPL QL: POSITIVE
FETAL CELL SCN BLD QL ROSETTE: NEGATIVE
RH BLD: NEGATIVE
RPR SER QL: NORMAL

## 2018-06-17 PROCEDURE — 86900 BLOOD TYPING SEROLOGIC ABO: CPT | Performed by: OBSTETRICS & GYNECOLOGY

## 2018-06-17 PROCEDURE — 86901 BLOOD TYPING SEROLOGIC RH(D): CPT | Performed by: OBSTETRICS & GYNECOLOGY

## 2018-06-17 PROCEDURE — 86850 RBC ANTIBODY SCREEN: CPT | Performed by: OBSTETRICS & GYNECOLOGY

## 2018-06-17 PROCEDURE — 85461 HEMOGLOBIN FETAL: CPT | Performed by: OBSTETRICS & GYNECOLOGY

## 2018-06-17 RX ORDER — ACETAMINOPHEN 325 MG/1
325 TABLET ORAL EVERY 4 HOURS PRN
Qty: 30 TABLET | Refills: 0
Start: 2018-06-17 | End: 2019-03-27 | Stop reason: ALTCHOICE

## 2018-06-17 RX ORDER — IBUPROFEN 600 MG/1
600 TABLET ORAL EVERY 6 HOURS PRN
Qty: 30 TABLET | Refills: 0
Start: 2018-06-17 | End: 2019-03-27

## 2018-06-17 RX ORDER — DOCUSATE SODIUM 100 MG/1
100 CAPSULE, LIQUID FILLED ORAL 2 TIMES DAILY
Qty: 10 CAPSULE | Refills: 0
Start: 2018-06-17 | End: 2019-03-27 | Stop reason: ALTCHOICE

## 2018-06-17 RX ORDER — DIAPER,BRIEF,INFANT-TODD,DISP
1 EACH MISCELLANEOUS AS NEEDED
Qty: 30 G | Refills: 0
Start: 2018-06-17 | End: 2019-03-27

## 2018-06-17 RX ADMIN — DOCUSATE SODIUM 100 MG: 100 CAPSULE, LIQUID FILLED ORAL at 09:09

## 2018-06-17 RX ADMIN — WITCH HAZEL 1 PAD: 500 SOLUTION RECTAL; TOPICAL at 09:09

## 2018-06-17 RX ADMIN — HYDROCORTISONE 1 APPLICATION: 1 CREAM TOPICAL at 09:09

## 2018-06-17 RX ADMIN — HUMAN RHO(D) IMMUNE GLOBULIN 300 MCG: 300 INJECTION, SOLUTION INTRAMUSCULAR at 20:35

## 2018-06-17 RX ADMIN — IBUPROFEN 600 MG: 600 TABLET ORAL at 09:09

## 2018-06-17 RX ADMIN — DOCUSATE SODIUM 100 MG: 100 CAPSULE, LIQUID FILLED ORAL at 18:56

## 2018-06-17 NOTE — LACTATION NOTE
This note was copied from a baby's chart  Called to mom's room to answer some breastfeeding questions  Mom stated that infant nursed for 1 1/2 hours earlier and she does not want to wake her  Explained that due to infant's gestational age, she should wake infant for feeding at least every 3 hours  Mom feels breasts are lawson, but feel soft to me  Encouraged her to continue to supplement infant as ordered until breasts full  Assisted infant to breast in cradle hold  With minimal assist, infant did latch well  Mom needs encouragement to continue to stimulate infant to suck

## 2018-06-17 NOTE — SOCIAL WORK
Breast pump consult  [facility] referral sent to [facility] for Medela pump delivered to pt @ bedside for Monday discharge  No other needs noted

## 2018-06-17 NOTE — PLAN OF CARE
Problem: DISCHARGE PLANNING - CARE MANAGEMENT  Goal: Discharge to post-acute care or home with appropriate resources  INTERVENTIONS:  - Conduct assessment to determine patient/family and health care team treatment goals, and need for post-acute services based on payer coverage, community resources, and patient preferences, and barriers to discharge  - Address psychosocial, clinical, and financial barriers to discharge as identified in assessment in conjunction with the patient/family and health care team  - Arrange appropriate level of post-acute services according to patient's   needs and preference and payer coverage in collaboration with the physician and health care team  - Communicate with and update the patient/family, physician, and health care team regarding progress on the discharge plan  - Arrange appropriate transportation to post-acute venues  - Discharge to home w/breast pump  Outcome: Progressing

## 2018-06-17 NOTE — LACTATION NOTE
This note was copied from a baby's chart  Mom state infant has been feeding much better now  Observed infant at breast in laid back position  Good latch/suck noted  Mom encouraged to call for additional as needed

## 2018-06-17 NOTE — PROGRESS NOTES
Progress Note - OB/GYN   Maximiliano Number 34 y o  female MRN: [id number]  Unit/Bed#: -01 Encounter: [id number]    Assessment:  Post partum Day #1 s/p , stable    Plan:  Continue routine post partum care  Encourage ambulation  Encourage breastfeeding  Rh negative  Manual extraction of adherent placenta s/p 2g ancef IV x1  Patient would like early discharge if baby can go home today    Subjective/Objective   Chief Complaint:     Post delivery    Subjective:     Pain: yes, cramping, improved with meds  Tolerating PO: yes  Voiding: yes  Flatus: yes  BM: no  Ambulating: yes  Breastfeeding:  Yes  Chest pain: no  Shortness of breath: no  Leg pain: no  Lochia: minimal    Objective:     Vitals: /65 (BP Location: Right arm)   Pulse 73   Temp 98 7 °F (37 1 °C) (Oral)   Resp 18   Ht 5' 10" (1 778 m)   Wt 82 6 kg (182 lb)   LMP 10/11/2017   SpO2 98%   Breastfeeding?  Yes   BMI 26 11 kg/m²       Intake/Output Summary (Last 24 hours) at 18 0729  Last data filed at 18 1228   Gross per 24 hour   Intake                0 ml   Output             1500 ml   Net            -1500 ml       Lab Results   Component Value Date    WBC 16 57 (H) 06/15/2018    HGB 12 9 06/15/2018    HCT 39 2 06/15/2018    MCV 91 06/15/2018     06/15/2018       Current Facility-Administered Medications   Medication Dose Route Frequency    acetaminophen (TYLENOL) tablet 650 mg  650 mg Oral Q6H PRN    benzocaine-menthol-lanolin-aloe (DERMOPLAST) 20-0 5 % topical spray   Topical TID PRN    calcium carbonate (TUMS) chewable tablet 1,000 mg  1,000 mg Oral Daily PRN    diphenhydrAMINE (BENADRYL) tablet 25 mg  25 mg Oral Q6H PRN    docusate sodium (COLACE) capsule 100 mg  100 mg Oral BID    hydrocortisone 1 % cream 1 application  1 application Topical PRN    ibuprofen (MOTRIN) tablet 600 mg  600 mg Oral Q6H PRN    lactated ringers infusion  125 mL/hr Intravenous Continuous    ondansetron (ZOFRAN) injection 4 mg  4 mg Intravenous Q8H PRN    oxyCODONE-acetaminophen (PERCOCET) 5-325 mg per tablet 1 tablet  1 tablet Oral Q4H PRN    oxyCODONE-acetaminophen (PERCOCET) 5-325 mg per tablet 2 tablet  2 tablet Oral Q4H PRN    Rho(D) immune globulin (RHOGAM ULTRA-FILTERED PLUS) IM injection 300 mcg  300 mcg Intramuscular Once    simethicone (MYLICON) chewable tablet 80 mg  80 mg Oral 4x Daily PRN    witch hazel-glycerin (TUCKS) topical pad 1 pad  1 pad Topical PRN       Physical Exam:     Gen: AAOx3, NAD  CV: RRR  Lungs: CTA b/l  Abd: Soft, non-tender, non-distended, no rebound or guarding  Uterine fundus firm and non-tender, 1 cm below the umbilicus  Ext: Non tender    Ana Valencia, PGY-1  OB/GYN  6/17/2018  7:29 AM

## 2018-06-18 VITALS
SYSTOLIC BLOOD PRESSURE: 104 MMHG | RESPIRATION RATE: 20 BRPM | BODY MASS INDEX: 26.05 KG/M2 | OXYGEN SATURATION: 98 % | TEMPERATURE: 98 F | DIASTOLIC BLOOD PRESSURE: 62 MMHG | WEIGHT: 182 LBS | HEIGHT: 70 IN | HEART RATE: 111 BPM

## 2018-06-18 RX ADMIN — DOCUSATE SODIUM 100 MG: 100 CAPSULE, LIQUID FILLED ORAL at 10:59

## 2018-06-18 RX ADMIN — IBUPROFEN 600 MG: 600 TABLET ORAL at 11:06

## 2018-06-18 NOTE — PLAN OF CARE
DISCHARGE PLANNING     Discharge to home or other facility with appropriate resources Progressing        DISCHARGE PLANNING - CARE MANAGEMENT     Discharge to post-acute care or home with appropriate resources Progressing        INFECTION - ADULT     Absence or prevention of progression during hospitalization Progressing        Knowledge Deficit     Patient/family/caregiver demonstrates understanding of disease process, treatment plan, medications, and discharge instructions Progressing        PAIN - ADULT     Verbalizes/displays adequate comfort level or baseline comfort level Progressing        POSTPARTUM     Experiences normal postpartum course Progressing     Appropriate maternal -  bonding Progressing     Establishment of infant feeding pattern Progressing     Incision(s), wounds(s) or drain site(s) healing without S/S of infection Progressing        SAFETY ADULT     Patient will remain free of falls Progressing     Maintain or return to baseline ADL function Progressing     Maintain or return mobility status to optimal level Progressing

## 2018-06-18 NOTE — CASE MANAGEMENT
Notification of Birth and  Information  This is a Notification of birth and  information to our facility [facility]  Please be advised that this patient is currently in our facility under Inpatient Status  Below you will find the Birth/ Summary, Attending Physician and Facilitys information including NPI# and contact for the Utilization  assigned to the [facility] where the patient is receiving services  Please feel free to contact the Utilization Review Department with any questions  Mothers Information:  Any Bradford  MRN: [id number]  YOB: 1980  Admission Date: 6/15/2018  2:12 PM  Discharge Date: 2018  3:33 PM  Disposition: Home/Self Care  Admitting Diagnosis:   O80 VAGINAL DELIVERY   Information:  Estimated Date of Delivery: 18  Information for the patient's :  Brisa Lackey) [[id number]]      Delivery Information:  Sex: female  Delivered 2018 3:51 AM by Vaginal, Spontaneous Delivery; Gestational Age: 30w2d     Measurements:  Weight: 5 lb 8 7 oz (2515 g); Height: 18"    APGAR 1 minute 5 minutes 10 minutes   Totals: 8 9      OB History      Para Term  AB Living    1 1 0 1 0 1    SAB TAB Ectopic Multiple Live Births    0 0 0 0 1        Attending Physician:  TIN Lugo  Specialty- Obstetrics and Gynecology  [facility] ID- [id number]  [address], [address]  Phone 1: [phone]  Fax: [phone] [address]  [phone]  Tax ID: [id number]  NPI: [id number]    [address] [place] [address] in the [employer] by Alejandro Valencia for 2017  Network Utilization Review Department  Phone: [phone];  Fax [phone]  ATTENTION: The Network Utilization Review Department is now centralized for our  Facilities  Make a note that we have a new phone and fax numbers for our Department  Please call with any questions or concerns to [phone] and carefully follow the prompts so that you are directed to the right person  All voicemails are confidential  Fax any determinations, approvals, denials, and requests for initial or continue stay review clinical to [phone]  Due to HIGH CALL volume, it would be easier if you could please send faxed requests to expedite your requests and in part, help us provide discharge notifications faster

## 2018-06-18 NOTE — PROGRESS NOTES
Progress Note - OB/GYN   Jenifer Dalton 34 y o  female MRN: [id number]  Unit/Bed#: -01 Encounter: [id number]    Assessment:  Post partum Day #2 s/p , stable    Plan:  Continue routine post partum care  Encourage ambulation  Encourage breastfeeding  Rh negative s/p RhoGAM on   Manual extraction of adherent placenta s/p 2g ancef IV x1  Dispo: VSS, D/C home today    Subjective/Objective   Chief Complaint:     Post delivery    Subjective:   Pt c/o of some discomfort on her bottom, states she told it was hemorrhoids  Pt has not had a BM yet  Otherwise no issues  Pain: yes, improved with meds  Tolerating PO: yes  Voiding: yes  Flatus: yes  BM: no  Ambulating: yes  Breastfeeding:  Yes, every 3 hrs  Chest pain: no  Shortness of breath: no  Leg pain: no  Lochia: minimal    Objective:     Vitals: /77 (BP Location: Right arm)   Pulse 76   Temp 98 3 °F (36 8 °C) (Oral)   Resp 18   Ht 5' 10" (1 778 m)   Wt 82 6 kg (182 lb)   LMP 10/11/2017   SpO2 98%   Breastfeeding?  No   BMI 26 11 kg/m²     No intake or output data in the 24 hours ending 18 0617    Lab Results   Component Value Date    WBC 16 57 (H) 06/15/2018    HGB 12 9 06/15/2018    HCT 39 2 06/15/2018    MCV 91 06/15/2018     06/15/2018       Current Facility-Administered Medications   Medication Dose Route Frequency    acetaminophen (TYLENOL) tablet 650 mg  650 mg Oral Q6H PRN    benzocaine-menthol-lanolin-aloe (DERMOPLAST) 20-0 5 % topical spray   Topical TID PRN    calcium carbonate (TUMS) chewable tablet 1,000 mg  1,000 mg Oral Daily PRN    diphenhydrAMINE (BENADRYL) tablet 25 mg  25 mg Oral Q6H PRN    docusate sodium (COLACE) capsule 100 mg  100 mg Oral BID    hydrocortisone 1 % cream 1 application  1 application Topical PRN    ibuprofen (MOTRIN) tablet 600 mg  600 mg Oral Q6H PRN    lactated ringers infusion  125 mL/hr Intravenous Continuous    ondansetron (ZOFRAN) injection 4 mg  4 mg Intravenous Q8H PRN    oxyCODONE-acetaminophen (PERCOCET) 5-325 mg per tablet 1 tablet  1 tablet Oral Q4H PRN    oxyCODONE-acetaminophen (PERCOCET) 5-325 mg per tablet 2 tablet  2 tablet Oral Q4H PRN    simethicone (MYLICON) chewable tablet 80 mg  80 mg Oral 4x Daily PRN    witch hazel-glycerin (TUCKS) topical pad 1 pad  1 pad Topical PRN       Physical Exam:     Gen: AAOx3, NAD  CV: RRR  Lungs: CTA b/l  Abd: Soft, non-tender, non-distended, no rebound or guarding  Uterine fundus firm and non-tender, 1 cm below the umbilicus  Ext: Non tender    Orlando Pichardo MD  OBGYN, PGY-1  6/18/2018 6:19 AM

## 2018-06-19 NOTE — PLAN OF CARE
DISCHARGE PLANNING     Discharge to home or other facility with appropriate resources Completed        DISCHARGE PLANNING - CARE MANAGEMENT     Discharge to post-acute care or home with appropriate resources Completed        INFECTION - ADULT     Absence or prevention of progression during hospitalization Completed        Knowledge Deficit     Patient/family/caregiver demonstrates understanding of disease process, treatment plan, medications, and discharge instructions Completed        PAIN - ADULT     Verbalizes/displays adequate comfort level or baseline comfort level Completed        POSTPARTUM     Experiences normal postpartum course Completed     Appropriate maternal -  bonding Completed     Establishment of infant feeding pattern Completed     Incision(s), wounds(s) or drain site(s) healing without S/S of infection Completed        SAFETY ADULT     Patient will remain free of falls Completed     Maintain or return to baseline ADL function Completed     Maintain or return mobility status to optimal level Completed

## 2019-01-14 ENCOUNTER — TRANSCRIBE ORDERS (OUTPATIENT)
Dept: LAB | Facility: HOSPITAL | Age: 39
End: 2019-01-14

## 2019-01-14 ENCOUNTER — APPOINTMENT (OUTPATIENT)
Dept: LAB | Facility: HOSPITAL | Age: 39
End: 2019-01-14
Attending: OBSTETRICS & GYNECOLOGY
Payer: COMMERCIAL

## 2019-01-14 DIAGNOSIS — Z32.00 PREGNANCY EXAMINATION OR TEST, PREGNANCY UNCONFIRMED: Primary | ICD-10-CM

## 2019-01-14 DIAGNOSIS — Z32.00 PREGNANCY EXAMINATION OR TEST, PREGNANCY UNCONFIRMED: ICD-10-CM

## 2019-01-14 LAB — B-HCG SERPL-ACNC: ABNORMAL MIU/ML

## 2019-01-14 PROCEDURE — 84702 CHORIONIC GONADOTROPIN TEST: CPT

## 2019-01-14 PROCEDURE — 36415 COLL VENOUS BLD VENIPUNCTURE: CPT

## 2019-01-24 ENCOUNTER — OB ABSTRACT (OUTPATIENT)
Dept: OBGYN CLINIC | Facility: CLINIC | Age: 39
End: 2019-01-24

## 2019-01-30 PROCEDURE — 87491 CHLMYD TRACH DNA AMP PROBE: CPT | Performed by: OBSTETRICS & GYNECOLOGY

## 2019-01-30 PROCEDURE — 87591 N.GONORRHOEAE DNA AMP PROB: CPT | Performed by: OBSTETRICS & GYNECOLOGY

## 2019-01-30 PROCEDURE — G0145 SCR C/V CYTO,THINLAYER,RESCR: HCPCS | Performed by: OBSTETRICS & GYNECOLOGY

## 2019-01-31 ENCOUNTER — TRANSCRIBE ORDERS (OUTPATIENT)
Dept: PERINATAL CARE | Facility: CLINIC | Age: 39
End: 2019-01-31

## 2019-01-31 ENCOUNTER — LAB REQUISITION (OUTPATIENT)
Dept: LAB | Facility: HOSPITAL | Age: 39
End: 2019-01-31
Payer: COMMERCIAL

## 2019-01-31 DIAGNOSIS — Z11.8 ENCOUNTER FOR SCREENING FOR OTHER INFECTIOUS AND PARASITIC DISEASES: ICD-10-CM

## 2019-01-31 DIAGNOSIS — O09.90 SUPERVISION OF HIGH-RISK PREGNANCY: Primary | ICD-10-CM

## 2019-01-31 DIAGNOSIS — Z01.419 ENCOUNTER FOR GYNECOLOGICAL EXAMINATION WITHOUT ABNORMAL FINDING: ICD-10-CM

## 2019-01-31 DIAGNOSIS — Z36.89 ENCOUNTER FOR OTHER SPECIFIED ANTENATAL SCREENING: ICD-10-CM

## 2019-02-01 LAB
C TRACH DNA SPEC QL NAA+PROBE: NEGATIVE
N GONORRHOEA DNA SPEC QL NAA+PROBE: NEGATIVE

## 2019-02-05 LAB
LAB AP GYN PRIMARY INTERPRETATION: NORMAL
Lab: NORMAL

## 2019-02-19 ENCOUNTER — ROUTINE PRENATAL (OUTPATIENT)
Dept: PERINATAL CARE | Facility: CLINIC | Age: 39
End: 2019-02-19
Payer: COMMERCIAL

## 2019-02-19 VITALS
HEIGHT: 70 IN | HEART RATE: 64 BPM | WEIGHT: 178.2 LBS | BODY MASS INDEX: 25.51 KG/M2 | SYSTOLIC BLOOD PRESSURE: 116 MMHG | DIASTOLIC BLOOD PRESSURE: 75 MMHG

## 2019-02-19 DIAGNOSIS — Z3A.13 13 WEEKS GESTATION OF PREGNANCY: ICD-10-CM

## 2019-02-19 DIAGNOSIS — Z23 ENCOUNTER FOR IMMUNIZATION: ICD-10-CM

## 2019-02-19 DIAGNOSIS — O09.891 HISTORY OF PRETERM DELIVERY, CURRENTLY PREGNANT, FIRST TRIMESTER: ICD-10-CM

## 2019-02-19 DIAGNOSIS — O09.90 SUPERVISION OF HIGH-RISK PREGNANCY: ICD-10-CM

## 2019-02-19 DIAGNOSIS — O09.521 ELDERLY MULTIGRAVIDA, FIRST TRIMESTER: Primary | ICD-10-CM

## 2019-02-19 PROCEDURE — 76801 OB US < 14 WKS SINGLE FETUS: CPT | Performed by: OBSTETRICS & GYNECOLOGY

## 2019-02-19 PROCEDURE — 76813 OB US NUCHAL MEAS 1 GEST: CPT | Performed by: OBSTETRICS & GYNECOLOGY

## 2019-02-19 PROCEDURE — 99241 PR OFFICE CONSULTATION NEW/ESTAB PATIENT 15 MIN: CPT | Performed by: OBSTETRICS & GYNECOLOGY

## 2019-02-19 NOTE — LETTER
February 21, 2019     Rafi Cisneros MD  [address] [facility] [address]    Patient: Kwabena Fontenot   YOB: 1980   Date of Visit: 2/19/2019       Dear Dr Cora Mendoza: Thank you for referring Kwabena Fontenot to me for evaluation  Below are my notes for this consultation  If you have questions, please do not hesitate to call me  I look forward to following your patient along with you  Sincerely,        Dian Quiñones MD        CC: No Recipients  Dian Quiñones MD  2/21/2019  7:32 AM  Sign at close encounter  Please refer to the [facility] ultrasound report in Ob Procedures for additional information regarding the visit to the [facility]  today

## 2019-02-21 PROBLEM — Z34.02 ENCOUNTER FOR PRENATAL CARE OF FIRST PREGNANCY, SECOND TRIMESTER: Status: RESOLVED | Noted: 2018-01-24 | Resolved: 2019-02-21

## 2019-02-21 PROBLEM — Z67.91 RH NEGATIVE, ANTEPARTUM: Status: RESOLVED | Noted: 2018-01-24 | Resolved: 2019-02-21

## 2019-02-21 PROBLEM — O21.9 NAUSEA AND VOMITING DURING PREGNANCY: Status: RESOLVED | Noted: 2018-01-24 | Resolved: 2019-02-21

## 2019-02-21 PROBLEM — O26.899 RH NEGATIVE, ANTEPARTUM: Status: RESOLVED | Noted: 2018-01-24 | Resolved: 2019-02-21

## 2019-02-21 PROBLEM — O09.519 ADVANCED MATERNAL AGE, PRIMIGRAVIDA, ANTEPARTUM: Status: RESOLVED | Noted: 2018-01-24 | Resolved: 2019-02-21

## 2019-02-21 PROBLEM — Z3A.35 35 WEEKS GESTATION OF PREGNANCY: Status: RESOLVED | Noted: 2018-06-15 | Resolved: 2019-02-21

## 2019-02-21 NOTE — PROGRESS NOTES
Please refer to the [facility] ultrasound report in Ob Procedures for additional information regarding the visit to the [facility]  today

## 2019-02-27 ENCOUNTER — TELEPHONE (OUTPATIENT)
Dept: PERINATAL CARE | Facility: CLINIC | Age: 39
End: 2019-02-27

## 2019-02-27 NOTE — LETTER
Name: Mary Hernandez  : 1980  MRN: [id number]    To:   Centralized Faxing Team [phone]      The attached documents are complete  Please scan and add into the patient's chart  No other action is needed at this time  Please call us with any questions at [phone]      Severino Lee RN

## 2019-02-27 NOTE — LETTER
Name: Huong Unger  : 1980  MRN: [id number]    To:   Centralized Faxing Team [phone]      The attached documents are complete  Please scan and add into the patient's chart  No other action is needed at this time  Please call us with any questions at [phone]      Christin Siddiqi RN

## 2019-02-27 NOTE — TELEPHONE ENCOUNTER
[id number] results WNL given to [facility] along with gender at her request Results WNl also given to her  who is listed on her communication consent  instuctions given and TRF mailed for [facility] blood draw

## 2019-02-27 NOTE — LETTER
02/27/19  Cristobalney Slight  1980    Thank you for completing the cell-free DNA screen ("non-invasive prenatal screening" or "[id number]")  To obtain comprehensive screening results, please complete blood work for MSAFP (maternal-serum alpha fetoprotein) between the weeks of ______ to ______  Based on your insurance coverage, please use one of the following locations  Call our office for any questions at [phone]      Leonel Almonte [facility] [address]   [facility], [facility], [address] [facility]   Phone: [address]  [address] [facility], [facility], [address]   Phone: [address]  Phone: [phone] [address] [facility], [address] [place] [address] Road   Phone: [phone] Erin Escobar for lab)    [address] [facility],[address]  BRUCE [address], [address]; [place], [address] [place] [address] Close   Phone: [phone]    [address] [facility]  [address] [facility]   Phone: [phone]    Sincerely,    Melinda Hernandez RN

## 2019-03-05 ENCOUNTER — DOCUMENTATION (OUTPATIENT)
Dept: PERINATAL CARE | Facility: CLINIC | Age: 39
End: 2019-03-05

## 2019-03-05 NOTE — PROGRESS NOTES
Voice Mail message received from [facility]  Patient approved for Coronita,injection weekly therapy, approval # [id number]  Dates of service 3/8/2019 thru 8/2/2019  Phone call to [facility] [phone] ext  2031, per Garth Fortune she will be contacting  [facility] with approval infomation, patient can call [facility] if co-pay assistance needed

## 2019-03-13 ENCOUNTER — ROUTINE PRENATAL (OUTPATIENT)
Dept: PERINATAL CARE | Facility: CLINIC | Age: 39
End: 2019-03-13
Payer: COMMERCIAL

## 2019-03-13 VITALS
BODY MASS INDEX: 26.43 KG/M2 | HEIGHT: 70 IN | WEIGHT: 184.6 LBS | HEART RATE: 76 BPM | SYSTOLIC BLOOD PRESSURE: 111 MMHG | DIASTOLIC BLOOD PRESSURE: 73 MMHG

## 2019-03-13 DIAGNOSIS — Z3A.16 16 WEEKS GESTATION OF PREGNANCY: Primary | ICD-10-CM

## 2019-03-13 DIAGNOSIS — O09.522 ELDERLY MULTIGRAVIDA IN SECOND TRIMESTER: ICD-10-CM

## 2019-03-13 PROBLEM — O09.529 AMA (ADVANCED MATERNAL AGE) MULTIGRAVIDA 35+: Status: ACTIVE | Noted: 2019-03-13

## 2019-03-13 PROCEDURE — 76817 TRANSVAGINAL US OBSTETRIC: CPT | Performed by: OBSTETRICS & GYNECOLOGY

## 2019-03-13 NOTE — PROGRESS NOTES
A transvaginal ultrasound was performed  Sonographer note on use of High Level Disinfection Process (Trophon) for transvaginal probe# 2 used, serial [id number]    Anabella Muniz

## 2019-03-14 ENCOUNTER — ROUTINE PRENATAL (OUTPATIENT)
Dept: OBGYN CLINIC | Facility: CLINIC | Age: 39
End: 2019-03-14

## 2019-03-14 VITALS — SYSTOLIC BLOOD PRESSURE: 120 MMHG | DIASTOLIC BLOOD PRESSURE: 70 MMHG | BODY MASS INDEX: 26.69 KG/M2 | WEIGHT: 186 LBS

## 2019-03-14 DIAGNOSIS — Z3A.16 16 WEEKS GESTATION OF PREGNANCY: ICD-10-CM

## 2019-03-14 DIAGNOSIS — Z34.92 ENCOUNTER FOR SUPERVISION OF NORMAL PREGNANCY IN SECOND TRIMESTER, UNSPECIFIED GRAVIDITY: Primary | ICD-10-CM

## 2019-03-14 PROCEDURE — PNV: Performed by: OBSTETRICS & GYNECOLOGY

## 2019-03-14 NOTE — PROGRESS NOTES
Pt with history of  labor and short interval   Reviewed  ultrasound from 3/13  Recommendations from the ultrasound:     1  Follow-up cervical length in 2 weeks and repeated at 20 weeks with Level II ultrasound  2   mg daily to be started at 16 weeks  Pt recommended to take 2 baby Aspirins daily but bruises easily so is only taking 1 baby ASA daily       Pt will take either progesterone IM or cream (pt would prefer cream) - will call tomorrow to find out    Will have follow up serial ultrasounds for cervical length at  center    Follow up at 4 weeks

## 2019-03-27 ENCOUNTER — ROUTINE PRENATAL (OUTPATIENT)
Dept: PERINATAL CARE | Facility: CLINIC | Age: 39
End: 2019-03-27
Payer: COMMERCIAL

## 2019-03-27 VITALS
WEIGHT: 186 LBS | DIASTOLIC BLOOD PRESSURE: 76 MMHG | SYSTOLIC BLOOD PRESSURE: 115 MMHG | BODY MASS INDEX: 26.63 KG/M2 | HEART RATE: 102 BPM | HEIGHT: 70 IN

## 2019-03-27 DIAGNOSIS — O09.529 ANTEPARTUM MULTIGRAVIDA OF ADVANCED MATERNAL AGE: ICD-10-CM

## 2019-03-27 DIAGNOSIS — Z03.75 ENCOUNTER FOR SUSPECTED CERVICAL SHORTENING RULED OUT: ICD-10-CM

## 2019-03-27 DIAGNOSIS — Z3A.18 18 WEEKS GESTATION OF PREGNANCY: ICD-10-CM

## 2019-03-27 DIAGNOSIS — O09.899 HISTORY OF PRETERM DELIVERY, CURRENTLY PREGNANT: Primary | ICD-10-CM

## 2019-03-27 PROCEDURE — 76815 OB US LIMITED FETUS(S): CPT | Performed by: OBSTETRICS & GYNECOLOGY

## 2019-03-27 PROCEDURE — 76817 TRANSVAGINAL US OBSTETRIC: CPT | Performed by: OBSTETRICS & GYNECOLOGY

## 2019-03-27 PROCEDURE — 99213 OFFICE O/P EST LOW 20 MIN: CPT | Performed by: OBSTETRICS & GYNECOLOGY

## 2019-03-27 NOTE — PROGRESS NOTES
A transvaginal ultrasound was performed  Sonographer note on use of High Level Disinfection Process (Trophon) for transvaginal probe# 1  used, serial # [id number]    Ashley Perea RDMS

## 2019-03-27 NOTE — PATIENT INSTRUCTIONS
Thank you for choosing [address] [facility] for your  care today  If you have any questions about your ultrasound or care, please do not hesitate to contact us or your primary obstetrician  Deep Venous Thrombosis   WHAT YOU NEED TO KNOW:   What is deep venous thrombosis? Deep venous thrombosis (DVT) is a blood clot that forms in a deep vein of the body  The deep veins in the legs, thighs, and hips are the most common sites for DVT  DVT can also occur in a deep vein within your arms  The clot prevents the normal flow of blood in the vein  The blood backs up and causes pain and swelling  The DVT can break into smaller pieces and travel to your lungs and cause a blockage called a pulmonary embolism  A pulmonary embolism can become life-threatening  What increases my risk for DVT? You may be at higher risk if you have had DVT before or you have a family history of blood clots  The following conditions also increase your risk:  · Age older than 60 years    · Obesity    · Injury to a deep vein, or surgery    · Use of hormone replacement therapy or birth control medicine such as pills or patches    · Pregnancy, and up to 6 weeks after childbirth     · A blood disorder that makes your blood clot faster than normal, such as factor V Leiden mutation    · Cancer or heart failure     · Limited mobility caused by bed rest, a leg cast, or sitting for long periods    · Varicose veins    · Catheter placed in a large vein  What are the signs and symptoms of DVT? · Swelling    · Redness    · Warmth, pain, or tenderness  How is DVT diagnosed? · A D-dimer blood test  may be done to check for signs of a blood clot  · An ultrasound  uses sound waves to show pictures on a monitor  An ultrasound may be done to show a clot in your vein  · Contrast venography  is an x-ray of a vein  Contrast liquid is used to make the vein easier to see on the x-ray   Tell a healthcare provider if you have ever had an allergic reaction to contrast liquid  How is DVT treated? · Blood thinners  help prevent the DVT from getting bigger and prevent new clots from forming  Examples of blood thinners include heparin, rivaroxaban, apixiban, and warfarin  The following are general safety guidelines to follow while you are taking a blood thinner:     ¨ Watch for bleeding and bruising  Watch for bleeding from your gums or nose  Watch for blood in your urine and bowel movements  Use a soft washcloth on your skin, and a soft toothbrush to brush your teeth  This can keep your skin and gums from bleeding  If you shave, use an electric shaver  Do not play contact sports  ¨ Tell your dentist and other healthcare providers that you take a blood thinner  Wear a bracelet or necklace that says you take this medicine  ¨ Do not start or stop any medicines unless your healthcare provider tells you to  Many medicines cannot be used with blood thinners  ¨ Tell your healthcare provider right away if you forget to take the blood thinner , or if you take too much  ¨ Warfarin  is a blood thinner that you may need to take  The following are additional things you should be aware of if you take warfarin:    § Foods and medicines can affect the amount of warfarin in your blood  Do not make major changes to your diet  Warfarin works best when you eat about the same amount of vitamin K every day  Vitamin K is found in green leafy vegetables and certain other foods  Ask for more information about what to eat or not to eat  § You will need to see your healthcare provider for follow-up visits  You will need regular blood tests to decide how much warfarin you need  · Clot busters  are emergency medicines that work to dissolve blood clots  They cannot be used during pregnancy or in people with medical conditions that increase their risk of bleeding  · A vena cava filter  may be placed inside your vena cava to treat your DVT  The vena cava is a large vein that brings blood from your lower body up to your heart  The filter traps blood clots and prevents them from going into your lungs  · Surgery , called a thrombectomy, may be done to remove the clot  A procedure called thrombolysis may instead be done to inject a clot buster that helps break the clot apart  How can I manage my DVT? · Wear pressure stockings  The stockings are tight and put pressure on your legs  This improves blood flow and helps prevent clots  Wear the stockings during the day  Do not wear them when you sleep  · Elevate your legs  above the level of your heart as often as you can  This will help decrease swelling and pain  Prop your legs on pillows or blankets to keep them elevated comfortably  · Exercise regularly  to help increase your blood flow  Walking is a good low-impact exercise  Talk to your healthcare provider about the best exercise plan for you  · Change body positions often  If you travel by car or work at a desk, move and stretch in your seat several times each hour  In an airplane, get up and walk every hour  If you are bedridden, ask for help to change your position every 1 to 2 hours  · Maintain a healthy weight  Ask your healthcare provider how much you should weigh  Ask him to help you create a weight loss plan if you are overweight  · Do not smoke  Nicotine and other chemicals in cigarettes and cigars can damage blood vessels and make it more difficult to manage your DVT  Ask your healthcare provider for information if you currently smoke and need help to quit  E-cigarettes or smokeless tobacco still contain nicotine  Talk to your healthcare provider before you use these products  Call 911 for the following:   · You feel lightheaded, short of breath, and have chest pain  · You cough up blood  When should I seek immediate care? · You develop new DVT symptoms in another leg or arm       When should I contact my healthcare provider? · Your gums or nose bleed  · You see blood in your urine or bowel movements  · Your bowel movements are black or darker than normal     · You have questions or concerns about your conditions or care  CARE AGREEMENT:   You have the right to help plan your care  Learn about your health condition and how it may be treated  Discuss treatment options with your caregivers to decide what care you want to receive  You always have the right to refuse treatment  The above information is an  only  It is not intended as medical advice for individual conditions or treatments  Talk to your doctor, nurse or pharmacist before following any medical regimen to see if it is safe and effective for you  © 2017 [facility] Information is for End User's use only and may not be sold, redistributed or otherwise used for commercial purposes  All illustrations and images included in CareNotes® are the copyrighted property of A D A M , Inc  or Alejandro Valencia

## 2019-03-27 NOTE — PROGRESS NOTES
[address]: Ms Tavares Wray was seen today at 18w5d for serial cervical length screening ultrasound  See ultrasound report under "OB Procedures" tab  Please don't hesitate to contact our office with any concerns or questions    Gerri Aden MD

## 2019-04-10 ENCOUNTER — ROUTINE PRENATAL (OUTPATIENT)
Dept: PERINATAL CARE | Facility: CLINIC | Age: 39
End: 2019-04-10
Payer: COMMERCIAL

## 2019-04-10 VITALS
HEIGHT: 70 IN | WEIGHT: 191 LBS | DIASTOLIC BLOOD PRESSURE: 81 MMHG | HEART RATE: 92 BPM | BODY MASS INDEX: 27.35 KG/M2 | SYSTOLIC BLOOD PRESSURE: 117 MMHG

## 2019-04-10 DIAGNOSIS — Z3A.20 20 WEEKS GESTATION OF PREGNANCY: ICD-10-CM

## 2019-04-10 DIAGNOSIS — O09.522 MULTIGRAVIDA OF ADVANCED MATERNAL AGE IN SECOND TRIMESTER: Primary | ICD-10-CM

## 2019-04-10 DIAGNOSIS — O09.212 PREGNANCY COMPLICATED BY PREVIOUS PRETERM LABOR IN SECOND TRIMESTER: ICD-10-CM

## 2019-04-10 DIAGNOSIS — O35.8XX0 PYELECTASIS OF FETUS ON PRENATAL ULTRASOUND: ICD-10-CM

## 2019-04-10 PROBLEM — O09.219 PREVIOUS PRETERM LABOR AFFECTING PREGNANCY, ANTEPARTUM: Status: ACTIVE | Noted: 2019-04-10

## 2019-04-10 PROCEDURE — 76811 OB US DETAILED SNGL FETUS: CPT | Performed by: OBSTETRICS & GYNECOLOGY

## 2019-04-10 PROCEDURE — 76817 TRANSVAGINAL US OBSTETRIC: CPT | Performed by: OBSTETRICS & GYNECOLOGY

## 2019-04-10 PROCEDURE — 99213 OFFICE O/P EST LOW 20 MIN: CPT | Performed by: OBSTETRICS & GYNECOLOGY

## 2019-04-10 RX ORDER — HYDROXYPROGESTERONE CAPROATE 250 MG/ML
INJECTION SUBCUTANEOUS
Status: ON HOLD | COMMUNITY
Start: 2019-03-08 | End: 2019-08-17 | Stop reason: ALTCHOICE

## 2019-04-11 PROBLEM — O35.8XX0 PYELECTASIS OF FETUS ON PRENATAL ULTRASOUND: Status: ACTIVE | Noted: 2019-04-11

## 2019-04-11 PROBLEM — O35.EXX0 PYELECTASIS OF FETUS ON PRENATAL ULTRASOUND: Status: ACTIVE | Noted: 2019-04-11

## 2019-04-24 ENCOUNTER — ROUTINE PRENATAL (OUTPATIENT)
Dept: PERINATAL CARE | Facility: CLINIC | Age: 39
End: 2019-04-24
Payer: COMMERCIAL

## 2019-04-24 VITALS
HEIGHT: 70 IN | WEIGHT: 194 LBS | HEART RATE: 102 BPM | BODY MASS INDEX: 27.77 KG/M2 | SYSTOLIC BLOOD PRESSURE: 125 MMHG | DIASTOLIC BLOOD PRESSURE: 62 MMHG

## 2019-04-24 DIAGNOSIS — O09.892 HISTORY OF PREMATURE DELIVERY, CURRENTLY PREGNANT, SECOND TRIMESTER: Primary | ICD-10-CM

## 2019-04-24 DIAGNOSIS — Z3A.22 22 WEEKS GESTATION OF PREGNANCY: ICD-10-CM

## 2019-04-24 PROCEDURE — 76817 TRANSVAGINAL US OBSTETRIC: CPT | Performed by: OBSTETRICS & GYNECOLOGY

## 2019-04-24 PROCEDURE — 76815 OB US LIMITED FETUS(S): CPT | Performed by: OBSTETRICS & GYNECOLOGY

## 2019-04-24 PROCEDURE — 99212 OFFICE O/P EST SF 10 MIN: CPT | Performed by: OBSTETRICS & GYNECOLOGY

## 2019-05-08 ENCOUNTER — ROUTINE PRENATAL (OUTPATIENT)
Dept: OBGYN CLINIC | Facility: CLINIC | Age: 39
End: 2019-05-08

## 2019-05-08 VITALS — DIASTOLIC BLOOD PRESSURE: 60 MMHG | WEIGHT: 192 LBS | SYSTOLIC BLOOD PRESSURE: 100 MMHG | BODY MASS INDEX: 27.55 KG/M2

## 2019-05-08 DIAGNOSIS — O35.8XX0 PYELECTASIS OF FETUS ON PRENATAL ULTRASOUND: ICD-10-CM

## 2019-05-08 DIAGNOSIS — Z3A.24 24 WEEKS GESTATION OF PREGNANCY: ICD-10-CM

## 2019-05-08 DIAGNOSIS — Z3A.20 20 WEEKS GESTATION OF PREGNANCY: ICD-10-CM

## 2019-05-08 DIAGNOSIS — Z3A.26 26 WEEKS GESTATION OF PREGNANCY: ICD-10-CM

## 2019-05-08 DIAGNOSIS — Z91.030 BEE ALLERGY STATUS: Primary | ICD-10-CM

## 2019-05-08 PROCEDURE — PNV: Performed by: OBSTETRICS & GYNECOLOGY

## 2019-05-08 RX ORDER — EPINEPHRINE 0.3 MG/.3ML
0.3 INJECTION SUBCUTANEOUS ONCE
Qty: 0.6 ML | Refills: 0 | Status: SHIPPED | OUTPATIENT
Start: 2019-05-08 | End: 2021-03-24

## 2019-05-08 RX ORDER — OMEPRAZOLE 20 MG/1
20 CAPSULE, DELAYED RELEASE ORAL DAILY
COMMUNITY
End: 2019-09-09

## 2019-06-04 PROBLEM — Z3A.20 20 WEEKS GESTATION OF PREGNANCY: Status: RESOLVED | Noted: 2019-03-13 | Resolved: 2019-06-04

## 2019-06-05 ENCOUNTER — ROUTINE PRENATAL (OUTPATIENT)
Dept: OBGYN CLINIC | Facility: CLINIC | Age: 39
End: 2019-06-05

## 2019-06-05 VITALS — WEIGHT: 197 LBS | SYSTOLIC BLOOD PRESSURE: 110 MMHG | BODY MASS INDEX: 28.27 KG/M2 | DIASTOLIC BLOOD PRESSURE: 62 MMHG

## 2019-06-05 DIAGNOSIS — Z3A.29 29 WEEKS GESTATION OF PREGNANCY: Primary | ICD-10-CM

## 2019-06-05 PROCEDURE — PNV: Performed by: OBSTETRICS & GYNECOLOGY

## 2019-06-12 ENCOUNTER — ULTRASOUND (OUTPATIENT)
Dept: PERINATAL CARE | Facility: CLINIC | Age: 39
End: 2019-06-12
Payer: COMMERCIAL

## 2019-06-12 VITALS
HEIGHT: 70 IN | BODY MASS INDEX: 28.77 KG/M2 | DIASTOLIC BLOOD PRESSURE: 73 MMHG | HEART RATE: 84 BPM | SYSTOLIC BLOOD PRESSURE: 106 MMHG | WEIGHT: 201 LBS

## 2019-06-12 DIAGNOSIS — Z3A.29 29 WEEKS GESTATION OF PREGNANCY: Primary | ICD-10-CM

## 2019-06-12 DIAGNOSIS — O35.8XX0 PYELECTASIS OF FETUS ON PRENATAL ULTRASOUND: ICD-10-CM

## 2019-06-12 DIAGNOSIS — O09.523 MULTIGRAVIDA OF ADVANCED MATERNAL AGE IN THIRD TRIMESTER: ICD-10-CM

## 2019-06-12 DIAGNOSIS — O09.219 PREVIOUS PRETERM LABOR AFFECTING PREGNANCY, ANTEPARTUM: ICD-10-CM

## 2019-06-12 PROCEDURE — 76816 OB US FOLLOW-UP PER FETUS: CPT | Performed by: OBSTETRICS & GYNECOLOGY

## 2019-06-28 ENCOUNTER — ROUTINE PRENATAL (OUTPATIENT)
Dept: OBGYN CLINIC | Facility: CLINIC | Age: 39
End: 2019-06-28

## 2019-06-28 VITALS — SYSTOLIC BLOOD PRESSURE: 110 MMHG | BODY MASS INDEX: 28.7 KG/M2 | WEIGHT: 200 LBS | DIASTOLIC BLOOD PRESSURE: 68 MMHG

## 2019-06-28 DIAGNOSIS — Z3A.29 29 WEEKS GESTATION OF PREGNANCY: ICD-10-CM

## 2019-06-28 DIAGNOSIS — Z34.83 PRENATAL CARE, SUBSEQUENT PREGNANCY IN THIRD TRIMESTER: Primary | ICD-10-CM

## 2019-06-28 DIAGNOSIS — N63.0 BREAST MASS IN FEMALE: ICD-10-CM

## 2019-06-28 DIAGNOSIS — O09.523 MULTIGRAVIDA OF ADVANCED MATERNAL AGE IN THIRD TRIMESTER: ICD-10-CM

## 2019-06-28 DIAGNOSIS — Z36.9 ANTENATAL SCREENING ENCOUNTER: ICD-10-CM

## 2019-06-28 DIAGNOSIS — O35.8XX0 PYELECTASIS OF FETUS ON PRENATAL ULTRASOUND: ICD-10-CM

## 2019-06-28 PROCEDURE — PNV: Performed by: PHYSICIAN ASSISTANT

## 2019-07-03 ENCOUNTER — APPOINTMENT (OUTPATIENT)
Dept: LAB | Facility: HOSPITAL | Age: 39
End: 2019-07-03
Attending: OBSTETRICS & GYNECOLOGY
Payer: COMMERCIAL

## 2019-07-03 ENCOUNTER — TRANSCRIBE ORDERS (OUTPATIENT)
Dept: LAB | Facility: HOSPITAL | Age: 39
End: 2019-07-03

## 2019-07-03 DIAGNOSIS — Z3A.26 26 WEEKS GESTATION OF PREGNANCY: ICD-10-CM

## 2019-07-03 DIAGNOSIS — Z36.9 ANTENATAL SCREENING ENCOUNTER: ICD-10-CM

## 2019-07-03 LAB
BASOPHILS # BLD AUTO: 0.04 THOUSANDS/ΜL (ref 0–0.1)
BASOPHILS NFR BLD AUTO: 0 % (ref 0–1)
EOSINOPHIL # BLD AUTO: 0.11 THOUSAND/ΜL (ref 0–0.61)
EOSINOPHIL NFR BLD AUTO: 1 % (ref 0–6)
ERYTHROCYTE [DISTWIDTH] IN BLOOD BY AUTOMATED COUNT: 13.2 % (ref 11.6–15.1)
GLUCOSE 1H P 50 G GLC PO SERPL-MCNC: 93 MG/DL
HCT VFR BLD AUTO: 36.9 % (ref 34.8–46.1)
HGB BLD-MCNC: 12.3 G/DL (ref 11.5–15.4)
IMM GRANULOCYTES # BLD AUTO: 0.06 THOUSAND/UL (ref 0–0.2)
IMM GRANULOCYTES NFR BLD AUTO: 1 % (ref 0–2)
LYMPHOCYTES # BLD AUTO: 1.62 THOUSANDS/ΜL (ref 0.6–4.47)
LYMPHOCYTES NFR BLD AUTO: 18 % (ref 14–44)
MCH RBC QN AUTO: 31.1 PG (ref 26.8–34.3)
MCHC RBC AUTO-ENTMCNC: 33.3 G/DL (ref 31.4–37.4)
MCV RBC AUTO: 93 FL (ref 82–98)
MONOCYTES # BLD AUTO: 0.63 THOUSAND/ΜL (ref 0.17–1.22)
MONOCYTES NFR BLD AUTO: 7 % (ref 4–12)
NEUTROPHILS # BLD AUTO: 6.81 THOUSANDS/ΜL (ref 1.85–7.62)
NEUTS SEG NFR BLD AUTO: 73 % (ref 43–75)
NRBC BLD AUTO-RTO: 0 /100 WBCS
PLATELET # BLD AUTO: 256 THOUSANDS/UL (ref 149–390)
PMV BLD AUTO: 10.6 FL (ref 8.9–12.7)
RBC # BLD AUTO: 3.95 MILLION/UL (ref 3.81–5.12)
WBC # BLD AUTO: 9.27 THOUSAND/UL (ref 4.31–10.16)

## 2019-07-03 PROCEDURE — 36415 COLL VENOUS BLD VENIPUNCTURE: CPT

## 2019-07-03 PROCEDURE — 86592 SYPHILIS TEST NON-TREP QUAL: CPT

## 2019-07-03 PROCEDURE — 82950 GLUCOSE TEST: CPT

## 2019-07-03 PROCEDURE — 85025 COMPLETE CBC W/AUTO DIFF WBC: CPT

## 2019-07-05 ENCOUNTER — OFFICE VISIT (OUTPATIENT)
Dept: OBGYN CLINIC | Facility: CLINIC | Age: 39
End: 2019-07-05
Payer: COMMERCIAL

## 2019-07-05 VITALS — SYSTOLIC BLOOD PRESSURE: 100 MMHG | WEIGHT: 203 LBS | DIASTOLIC BLOOD PRESSURE: 64 MMHG | BODY MASS INDEX: 29.13 KG/M2

## 2019-07-05 DIAGNOSIS — Z3A.33 33 WEEKS GESTATION OF PREGNANCY: Primary | ICD-10-CM

## 2019-07-05 DIAGNOSIS — Z3A.29 29 WEEKS GESTATION OF PREGNANCY: ICD-10-CM

## 2019-07-05 DIAGNOSIS — Z67.91 RH NEGATIVE STATE IN ANTEPARTUM PERIOD, THIRD TRIMESTER: ICD-10-CM

## 2019-07-05 DIAGNOSIS — O26.893 RH NEGATIVE STATE IN ANTEPARTUM PERIOD, THIRD TRIMESTER: ICD-10-CM

## 2019-07-05 DIAGNOSIS — O35.8XX0 PYELECTASIS OF FETUS ON PRENATAL ULTRASOUND: ICD-10-CM

## 2019-07-05 LAB — RPR SER QL: NORMAL

## 2019-07-05 PROCEDURE — 96372 THER/PROPH/DIAG INJ SC/IM: CPT

## 2019-07-05 NOTE — PROGRESS NOTES
The patient is here for a Rhogam injection  Injected into RIGHT Glut  The patient tolerated the injection well       ABISAI: 8/23/19    Lot #: [id number]  Exp: 3/15/2021  NDC: 4284-9100-77

## 2019-07-10 ENCOUNTER — ULTRASOUND (OUTPATIENT)
Dept: PERINATAL CARE | Facility: CLINIC | Age: 39
End: 2019-07-10
Payer: COMMERCIAL

## 2019-07-10 VITALS
HEIGHT: 70 IN | WEIGHT: 202 LBS | SYSTOLIC BLOOD PRESSURE: 103 MMHG | HEART RATE: 85 BPM | BODY MASS INDEX: 28.92 KG/M2 | DIASTOLIC BLOOD PRESSURE: 71 MMHG

## 2019-07-10 DIAGNOSIS — O09.523 MULTIGRAVIDA OF ADVANCED MATERNAL AGE IN THIRD TRIMESTER: Primary | ICD-10-CM

## 2019-07-10 DIAGNOSIS — Z3A.33 33 WEEKS GESTATION OF PREGNANCY: ICD-10-CM

## 2019-07-10 DIAGNOSIS — O35.8XX0 PYELECTASIS OF FETUS ON PRENATAL ULTRASOUND: ICD-10-CM

## 2019-07-10 PROCEDURE — 99212 OFFICE O/P EST SF 10 MIN: CPT | Performed by: OBSTETRICS & GYNECOLOGY

## 2019-07-10 PROCEDURE — 76816 OB US FOLLOW-UP PER FETUS: CPT | Performed by: OBSTETRICS & GYNECOLOGY

## 2019-07-11 PROBLEM — Z3A.33 33 WEEKS GESTATION OF PREGNANCY: Status: ACTIVE | Noted: 2019-06-12

## 2019-07-11 PROBLEM — O35.8XX0 PYELECTASIS OF FETUS ON PRENATAL ULTRASOUND: Status: RESOLVED | Noted: 2019-04-11 | Resolved: 2019-07-11

## 2019-07-11 PROBLEM — O35.EXX0 PYELECTASIS OF FETUS ON PRENATAL ULTRASOUND: Status: RESOLVED | Noted: 2019-04-11 | Resolved: 2019-07-11

## 2019-07-11 NOTE — PROGRESS NOTES
On exam today, Darryl Avery appears well, in no acute distress, and denies any complaints  Her abdomen is non-tender  There has been appropriate interval fetal growth  Good fetal movement and tone are seen  The amniotic fluid volume appears normal   The patient was informed of today's findings and all of her questions were answered  The limitations of ultrasound were reviewed   labor precautions and fetal kick counts were reviewed  We discussed follow-up in detail and I recommend the patient return in 2 weeks to initiate weekly  surveillance for the indication advanced maternal age  Thank you very much for allowing us to participate in the care of this very nice patient  Should you have any questions, please do not hesitate to contact our office  Please note, in addition to the time spent discussing the results of the ultrasound, I spent approximately 10 minutes of face-to-face time with the patient, greater than 50% of which was spent in counseling and the coordination of care for this patient  Portions of the record may have been created with voice recognition software  Occasional wrong word or "sound a like" substitutions may have occurred due to the inherent limitations of voice recognition software  Read the chart carefully and recognize, using context, where substitutions have occurred

## 2019-07-24 ENCOUNTER — ULTRASOUND (OUTPATIENT)
Dept: PERINATAL CARE | Facility: CLINIC | Age: 39
End: 2019-07-24
Payer: COMMERCIAL

## 2019-07-24 VITALS
HEIGHT: 70 IN | SYSTOLIC BLOOD PRESSURE: 123 MMHG | BODY MASS INDEX: 28.98 KG/M2 | DIASTOLIC BLOOD PRESSURE: 83 MMHG | WEIGHT: 202.4 LBS | HEART RATE: 105 BPM

## 2019-07-24 DIAGNOSIS — Z3A.35 35 WEEKS GESTATION OF PREGNANCY: ICD-10-CM

## 2019-07-24 DIAGNOSIS — O09.523 MULTIGRAVIDA OF ADVANCED MATERNAL AGE IN THIRD TRIMESTER: ICD-10-CM

## 2019-07-24 PROCEDURE — 76815 OB US LIMITED FETUS(S): CPT | Performed by: OBSTETRICS & GYNECOLOGY

## 2019-07-24 PROCEDURE — 59025 FETAL NON-STRESS TEST: CPT | Performed by: OBSTETRICS & GYNECOLOGY

## 2019-07-24 NOTE — LETTER
NST sleeve cover sheet    Patient name: Any Bradford  : 1980  MRN: [id number]    ABISAI: Estimated Date of Delivery: 19    Obstetrician: _______________________________    Reason(s) for testing:  __________________________________________      Testing frequency:    ___ 2x/wk  ___ 1x/wk  ___ Dopplers  ___ BPP?       Last growth scan: __________________________________________

## 2019-07-31 ENCOUNTER — ROUTINE PRENATAL (OUTPATIENT)
Dept: OBGYN CLINIC | Facility: CLINIC | Age: 39
End: 2019-07-31

## 2019-07-31 ENCOUNTER — ULTRASOUND (OUTPATIENT)
Dept: PERINATAL CARE | Facility: CLINIC | Age: 39
End: 2019-07-31
Payer: COMMERCIAL

## 2019-07-31 VITALS — BODY MASS INDEX: 29.56 KG/M2 | WEIGHT: 206 LBS | DIASTOLIC BLOOD PRESSURE: 62 MMHG | SYSTOLIC BLOOD PRESSURE: 100 MMHG

## 2019-07-31 VITALS
HEIGHT: 70 IN | HEART RATE: 101 BPM | WEIGHT: 205.6 LBS | DIASTOLIC BLOOD PRESSURE: 61 MMHG | BODY MASS INDEX: 29.43 KG/M2 | SYSTOLIC BLOOD PRESSURE: 99 MMHG

## 2019-07-31 DIAGNOSIS — Z3A.37 37 WEEKS GESTATION OF PREGNANCY: ICD-10-CM

## 2019-07-31 DIAGNOSIS — O09.523 MULTIGRAVIDA OF ADVANCED MATERNAL AGE IN THIRD TRIMESTER: Primary | ICD-10-CM

## 2019-07-31 DIAGNOSIS — Z3A.36 36 WEEKS GESTATION OF PREGNANCY: ICD-10-CM

## 2019-07-31 DIAGNOSIS — Z36.85 ANTENATAL SCREENING FOR STREPTOCOCCUS B: Primary | ICD-10-CM

## 2019-07-31 PROCEDURE — 76815 OB US LIMITED FETUS(S): CPT | Performed by: OBSTETRICS & GYNECOLOGY

## 2019-07-31 PROCEDURE — 59025 FETAL NON-STRESS TEST: CPT | Performed by: OBSTETRICS & GYNECOLOGY

## 2019-07-31 PROCEDURE — PNV: Performed by: OBSTETRICS & GYNECOLOGY

## 2019-07-31 PROCEDURE — 87653 STREP B DNA AMP PROBE: CPT | Performed by: OBSTETRICS & GYNECOLOGY

## 2019-07-31 NOTE — PROGRESS NOTES
ABISAI: 8/23/19  Patient is due for her GBS  No bleeding or cramping  Mild nausea, no vomiting or headache

## 2019-07-31 NOTE — PROGRESS NOTES
Patient was seen at  Center today for a nonstress test which was reactive  Patient is complaining of increased pelvic pressure over the last week  She denies any bleeding or leaking  She has not noted any regular contractions  GBS culture was done today  Cervical exam was 2-3 cm 50% -3 station  Signs an onset of labor was discussed  Patient is to return to the office in 1 week    Breast-feeding and perineal massage was discussed

## 2019-07-31 NOTE — PATIENT INSTRUCTIONS

## 2019-08-03 LAB — GP B STREP DNA SPEC QL NAA+PROBE: ABNORMAL

## 2019-08-05 PROBLEM — B95.1 POSITIVE GBS TEST: Status: ACTIVE | Noted: 2019-08-05

## 2019-08-09 ENCOUNTER — ULTRASOUND (OUTPATIENT)
Dept: PERINATAL CARE | Facility: CLINIC | Age: 39
End: 2019-08-09
Payer: COMMERCIAL

## 2019-08-09 VITALS
WEIGHT: 205.2 LBS | HEIGHT: 70 IN | BODY MASS INDEX: 29.38 KG/M2 | HEART RATE: 90 BPM | DIASTOLIC BLOOD PRESSURE: 72 MMHG | SYSTOLIC BLOOD PRESSURE: 118 MMHG

## 2019-08-09 DIAGNOSIS — O09.523 MULTIGRAVIDA OF ADVANCED MATERNAL AGE IN THIRD TRIMESTER: Primary | ICD-10-CM

## 2019-08-09 DIAGNOSIS — B95.1 POSITIVE GBS TEST: ICD-10-CM

## 2019-08-09 DIAGNOSIS — Z3A.38 38 WEEKS GESTATION OF PREGNANCY: ICD-10-CM

## 2019-08-09 PROCEDURE — 59025 FETAL NON-STRESS TEST: CPT | Performed by: OBSTETRICS & GYNECOLOGY

## 2019-08-09 PROCEDURE — 76815 OB US LIMITED FETUS(S): CPT | Performed by: OBSTETRICS & GYNECOLOGY

## 2019-08-11 PROBLEM — Z3A.38 38 WEEKS GESTATION OF PREGNANCY: Status: ACTIVE | Noted: 2019-06-12

## 2019-08-12 ENCOUNTER — HOSPITAL ENCOUNTER (OUTPATIENT)
Dept: MAMMOGRAPHY | Facility: CLINIC | Age: 39
Discharge: HOME | End: 2019-08-12
Payer: COMMERCIAL

## 2019-08-12 DIAGNOSIS — N63.0 BREAST MASS IN FEMALE: ICD-10-CM

## 2019-08-12 PROCEDURE — 76642 ULTRASOUND BREAST LIMITED: CPT

## 2019-08-13 ENCOUNTER — HOSPITAL ENCOUNTER (OUTPATIENT)
Dept: MAMMOGRAPHY | Facility: CLINIC | Age: 39
Discharge: HOME | End: 2019-08-13
Payer: COMMERCIAL

## 2019-08-13 ENCOUNTER — HOSPITAL ENCOUNTER (OUTPATIENT)
Dept: ULTRASOUND IMAGING | Facility: CLINIC | Age: 39
Discharge: HOME | End: 2019-08-13
Payer: COMMERCIAL

## 2019-08-13 ENCOUNTER — HOSPITAL ENCOUNTER (OUTPATIENT)
Dept: ULTRASOUND IMAGING | Facility: CLINIC | Age: 39
Discharge: HOME | End: 2019-08-13
Admitting: RADIOLOGY
Payer: COMMERCIAL

## 2019-08-13 VITALS — BODY MASS INDEX: 29.35 KG/M2 | HEIGHT: 70 IN | WEIGHT: 205 LBS

## 2019-08-13 VITALS — DIASTOLIC BLOOD PRESSURE: 80 MMHG | SYSTOLIC BLOOD PRESSURE: 120 MMHG | HEART RATE: 88 BPM

## 2019-08-13 DIAGNOSIS — N63.0 BREAST LUMP: ICD-10-CM

## 2019-08-13 DIAGNOSIS — R92.8 ABNORMAL MAMMOGRAM: ICD-10-CM

## 2019-08-13 DIAGNOSIS — R92.8 ABNORMAL ULTRASOUND OF BREAST: ICD-10-CM

## 2019-08-13 PROCEDURE — 88342 IMHCHEM/IMCYTCHM 1ST ANTB: CPT | Performed by: PATHOLOGY

## 2019-08-13 PROCEDURE — 76942 ECHO GUIDE FOR BIOPSY: CPT

## 2019-08-13 PROCEDURE — 77066 DX MAMMO INCL CAD BI: CPT

## 2019-08-13 PROCEDURE — 19083 BX BREAST 1ST LESION US IMAG: CPT

## 2019-08-13 PROCEDURE — 38505 NEEDLE BIOPSY LYMPH NODES: CPT

## 2019-08-13 PROCEDURE — 88341 IMHCHEM/IMCYTCHM EA ADD ANTB: CPT | Performed by: PATHOLOGY

## 2019-08-13 PROCEDURE — G0279 TOMOSYNTHESIS, MAMMO: HCPCS

## 2019-08-13 PROCEDURE — 76642 ULTRASOUND BREAST LIMITED: CPT

## 2019-08-13 PROCEDURE — 88360 TUMOR IMMUNOHISTOCHEM/MANUAL: CPT | Performed by: PATHOLOGY

## 2019-08-13 PROCEDURE — 88305 TISSUE EXAM BY PATHOLOGIST: CPT | Performed by: PATHOLOGY

## 2019-08-13 RX ORDER — LIDOCAINE HYDROCHLORIDE 10 MG/ML
4 INJECTION, SOLUTION INFILTRATION; PERINEURAL ONCE
Status: COMPLETED | OUTPATIENT
Start: 2019-08-13 | End: 2019-08-13

## 2019-08-13 RX ADMIN — LIDOCAINE HYDROCHLORIDE 4 ML: 10 INJECTION, SOLUTION INFILTRATION; PERINEURAL at 15:19

## 2019-08-13 RX ADMIN — LIDOCAINE HYDROCHLORIDE 4 ML: 10 INJECTION, SOLUTION INFILTRATION; PERINEURAL at 16:08

## 2019-08-13 NOTE — PROGRESS NOTES
Met with patient and Dr Carla Wagner                  regarding recommendation for;      _____ RIGHT ___x___LEFT      __x___Ultrasound guided  ______Stereotactic  Breast biopsy  __x___Verbalized understanding  Blood thinners:  _____yes __x___no    Date stopped: _____n/a______    Biopsy teaching sheet given:  _______yes ___x___no   Verbal review of procedure provided by Dr Carla Wagner and myself and pt verbalized understanding

## 2019-08-13 NOTE — DISCHARGE INSTR - OTHER ORDERS
POST LARGE CORE BREAST BIOPSY PATIENT INFORMATION      1  Place an ice pack inside your bra over the top of the dressing every hour for 20 minutes (20 minutes on, 60 minutes off)  Do this until bedtime  2  Do not shower or bathe until the following morning  3  You may bathe your breast carefully with the steri-strips in place  Be careful    Not to loosen them  The steri-strips will fall off in 3-5 days  4  You may have mild discomfort, and you may have some bruising where the   Needle entered the skin  This should clear within 5-7 days  5  If you need medicine for discomfort, take acetaminophen products such as   Tylenol  You may also take Advil or Motrin products  6  Do not participate in strenuous activities such as-tennis, aerobics, skiing,  Weight lifting, etc  for 24 hours  Refrain from swimming/soaking for 72 hours  7  Wearing a bra for sleeping may be more comfortable for the first 24-48 hours  8  Watch for continued bleeding, pain or fever over 101; please call with any questions or concerns  For procedures done at the \[facility]\""  [facility] [address] call:  Albert Ferrell RN at [phone]  Teddy Small RN at [phone]                    *After 4 PM call the Interventional Radiology Department                    [phone] and ask to speak with the nurse on call  For procedures done at the [address] [place] [address] call:         Nikolay Pelaez RN at   *After 4 PM call the Interventional Radiology Department   [phone] and ask to speak with the nurse on call  For procedures done at [address] [place] [address] call: The Radiology Nurse at [phone]  *After 4 PM call your physician, or go to the Emergency Department  9          The final results of your biopsy are usually available within one week

## 2019-08-13 NOTE — PROGRESS NOTES
Procedure type:    ____x_ultrasound guided _____stereotactic    Breast:    ___x__Left _____Right    Location: 2 o'clock 14cm fn    Needle: 16 ga Jami    # of passes: 4    Clip: Wing     Performed by: Dr Analia Burnham held for 5 minutes by:  Anat Linton(PCA)    Steri Strips:    __X___yes _____no    Band aid:    ___X__yes_____no    Tape and guaze:    _____yes ___X__no    Tolerated procedure:    __X___yes _____no

## 2019-08-13 NOTE — PROGRESS NOTES
Patient arrived via:    ___x__ambulatory    _____wheelchair    _____stretcher      Domestic violence screen    ______negative______positive    I could not this ask patient this question because her spouse was present      Breast Implants:    _______yes ___x_____no

## 2019-08-13 NOTE — PROGRESS NOTES
Ice pack given:    ___X__yes _____no    Discharge instructions signed by patient:    ___X__yes _____no    Discharge instructions given to patient:    __X___yes _____no    Discharged via:    ___X__amulatory    _____wheelchair    _____stretcher    Stable on discharge:    __XPOST LARGE CORE BREAST BIOPSY PATIENT INFORMATION      1  Place an ice pack inside your bra over the top of the dressing every hour for 20 minutes (20 minutes on, 60 minutes off)  Do this until bedtime  2  Do not shower or bathe until the following morning  3  You may bathe your breast carefully with the steri-strips in place  Be careful    Not to loosen them  The steri-strips will fall off in 3-5 days  4  You may have mild discomfort, and you may have some bruising where the   Needle entered the skin  This should clear within 5-7 days  5  If you need medicine for discomfort, take acetaminophen products such as   Tylenol  You may also take Advil or Motrin products  6  Do not participate in strenuous activities such as-tennis, aerobics, skiing,  Weight lifting, etc  for 24 hours  Refrain from swimming/soaking for 72 hours  7  Wearing a bra for sleeping may be more comfortable for the first 24-48 hours  8  Watch for continued bleeding, pain or fever over 101; please call with any questions or concerns  For procedures done at the [facility] [address] call:  Gabriela Rao RN at [phone]  Jeannine Romero RN at [phone]                    *After 4 PM call the Interventional Radiology Department                    [phone] and ask to speak with the nurse on call  For procedures done at the [address] [place] [address] call:         Rolanda Tay RN at   *After 4 PM call the Interventional Radiology Department   [phone] and ask to speak with the nurse on call  For procedures done at [address] [place] [address] call:   The Radiology Nurse at [phone]  *After 4 PM call your physician, or go to the Emergency Department  9          The final results of your biopsy are usually available within one week  ___yes ____no

## 2019-08-14 ENCOUNTER — ROUTINE PRENATAL (OUTPATIENT)
Dept: OBGYN CLINIC | Facility: CLINIC | Age: 39
End: 2019-08-14

## 2019-08-14 VITALS — SYSTOLIC BLOOD PRESSURE: 120 MMHG | DIASTOLIC BLOOD PRESSURE: 80 MMHG | BODY MASS INDEX: 28.7 KG/M2 | WEIGHT: 200 LBS

## 2019-08-14 DIAGNOSIS — Z3A.38 38 WEEKS GESTATION OF PREGNANCY: ICD-10-CM

## 2019-08-14 DIAGNOSIS — B95.1 POSITIVE GBS TEST: ICD-10-CM

## 2019-08-14 PROCEDURE — PNV: Performed by: OBSTETRICS & GYNECOLOGY

## 2019-08-14 NOTE — PROGRESS NOTES
Patient had a breast biopsy yesterday for a highly suspicious lesion seen on ultrasound and mammogram     Increased pelvic pressure  Patient her  discussed possible induction at 43 weeks but have not made a definitive decision  Pathology from breast biopsy done yesterday is not available yet  Positive GBS    Return to the office in 1 week or schedule induction sooner  Patient will call tomorrow if they decide on induction at 39 weeks      Awaiting breast biopsy results

## 2019-08-14 NOTE — PROGRESS NOTES
ABISAI: 8/23/19  Swelling in feet and hands  No bleeding  The patient has mild cramping when she's standing for too long  The patient has pelvic pressure  No nausea, vomiting or headache  Patient has lethargy and has trouble sleeping at night

## 2019-08-14 NOTE — PROGRESS NOTES
Post procedure call completed on 8/14/19 @ 1132    Bleeding: _____yes __x___no    Pain: __x___yes ______no    Redness/Swelling: ___x___yes ______no    Band aid removed: _____yes __x___no    Steri-Strips intact: __x____yes _____no  Pt states she is having pain and swelling in her left breast  She said the pain is a 3 on 1-5 pain scale  She said she is using the ice pack and Tylenol

## 2019-08-15 ENCOUNTER — TELEPHONE (OUTPATIENT)
Dept: MAMMOGRAPHY | Facility: CLINIC | Age: 39
End: 2019-08-15

## 2019-08-15 ENCOUNTER — TELEPHONE (OUTPATIENT)
Dept: OBGYN CLINIC | Facility: CLINIC | Age: 39
End: 2019-08-15

## 2019-08-15 ENCOUNTER — TELEPHONE (OUTPATIENT)
Dept: HEMATOLOGY ONCOLOGY | Facility: CLINIC | Age: 39
End: 2019-08-15

## 2019-08-15 PROBLEM — C50.412 MALIGNANT NEOPLASM OF UPPER-OUTER QUADRANT OF LEFT FEMALE BREAST (HCC): Status: ACTIVE | Noted: 2019-08-15

## 2019-08-15 NOTE — PROGRESS NOTES
Patient Past Medical History: pt currently 39 weeks pregnant, has had acid reflux during pregnancy          Allergies:  Bee venom        Past Breast History:     Previous Biopsy:   Yes______ No________      Breast: Right____ Left______       Malignant______ Benign_______      Treatments if applicable        Present Breast History:     Right__________    Left______x_______     Density___x______    Calcifications____________   Palpable________x______      Patient notified of results on:  8/15/19 by Dr Jareth Lopez    Date of Appointment with Surgeon 8/16/19 with Dr Kristin Barriga

## 2019-08-15 NOTE — TELEPHONE ENCOUNTER
New Patient Encounter      New Patient Intake Form   Patient Details:  Ashley End  1980  [id number]    Background Information:  [address] starts by opening a telephone encounter and gathering the following information   Who is calling to schedule? If not self, relationship to patient? Paula Mccabe, Breast nurse navigator   Referring Provider [facility]   What is the diagnosis? Invasive breast CA, 30 weeks pregnant   When was the diagnosis? 8/2019   Is patient aware of diagnosis? Yes   Reason for visit? NP DX   Have you had any testing done? If so: when, where? Yes   Are records in [facility]? Yes   Was the patient told to bring a disk? No   Scheduling Information:   Preferred Streetman:  Any   Requesting Specific Provider? no   Are there any dates/time the patient cannot be seen? no   Counseling Pre-Screen:  If the patient answers YES to any of the below questions, please route to the appropriate location specific counselor    Have you felt anxious or worried about your diagnosis and/or the treatment you are receiving? Did Not Speak to Patient   Has your diagnosis caused physical, emotional, or financial hardship for you? Did Not Speak to Patient   Do you anticipate any transportation issues to get to your appointment? Did Not Speak to Patient   Miscellaneous: pt's  is Dr Salma Quiroz ([facility])   After completing the above information, please route to Financial Counselor and the appropriate Nurse Navigator for review

## 2019-08-16 ENCOUNTER — HOSPITAL ENCOUNTER (OUTPATIENT)
Dept: ULTRASOUND IMAGING | Facility: CLINIC | Age: 39
Discharge: HOME | End: 2019-08-16
Payer: COMMERCIAL

## 2019-08-16 ENCOUNTER — TRANSCRIBE ORDERS (OUTPATIENT)
Dept: LAB | Facility: CLINIC | Age: 39
End: 2019-08-16

## 2019-08-16 ENCOUNTER — APPOINTMENT (OUTPATIENT)
Dept: LAB | Facility: CLINIC | Age: 39
End: 2019-08-16
Payer: COMMERCIAL

## 2019-08-16 ENCOUNTER — DOCUMENTATION (OUTPATIENT)
Dept: HEMATOLOGY ONCOLOGY | Facility: CLINIC | Age: 39
End: 2019-08-16

## 2019-08-16 ENCOUNTER — CONSULT (OUTPATIENT)
Dept: SURGICAL ONCOLOGY | Facility: CLINIC | Age: 39
End: 2019-08-16
Payer: COMMERCIAL

## 2019-08-16 VITALS
HEIGHT: 70 IN | DIASTOLIC BLOOD PRESSURE: 70 MMHG | RESPIRATION RATE: 18 BRPM | SYSTOLIC BLOOD PRESSURE: 122 MMHG | WEIGHT: 204 LBS | HEART RATE: 98 BPM | BODY MASS INDEX: 29.2 KG/M2 | TEMPERATURE: 98.7 F

## 2019-08-16 DIAGNOSIS — C50.412 MALIGNANT NEOPLASM OF UPPER-OUTER QUADRANT OF LEFT FEMALE BREAST, UNSPECIFIED ESTROGEN RECEPTOR STATUS (HCC): Primary | ICD-10-CM

## 2019-08-16 DIAGNOSIS — C50.412 MALIGNANT NEOPLASM OF UPPER-OUTER QUADRANT OF LEFT FEMALE BREAST, UNSPECIFIED ESTROGEN RECEPTOR STATUS (HCC): ICD-10-CM

## 2019-08-16 DIAGNOSIS — R92.8 ABNORMAL ULTRASOUND OF BREAST: ICD-10-CM

## 2019-08-16 PROCEDURE — 76641 ULTRASOUND BREAST COMPLETE: CPT

## 2019-08-16 PROCEDURE — 36415 COLL VENOUS BLD VENIPUNCTURE: CPT

## 2019-08-16 PROCEDURE — 76642 ULTRASOUND BREAST LIMITED: CPT

## 2019-08-16 PROCEDURE — 99245 OFF/OP CONSLTJ NEW/EST HI 55: CPT | Performed by: SURGERY

## 2019-08-16 PROCEDURE — 76377 3D RENDER W/INTRP POSTPROCES: CPT

## 2019-08-16 NOTE — PROGRESS NOTES
Surgical Oncology Consult Note       [address]  CANCER CARE [facility] SURGICAL ONCOLOGY [place]  [address] [facility]  [facility]  1980  [id number]  [address] A[address] Ojai  CANCER [facility] SURGICAL ONCOLOGY [place]  [address] [facility] [address]      Chief Complaint:     Chief Complaint   Patient presents with    Breast Cancer     New Diagnosis    Consult       Assessment and Plan:   Assessment/Plan    the patient is 39 weeks pregnant and presents with a new diagnosis of left breast cancer  The radiologist have recommended an MRI in approximately 4 weeks however this seems relatively long I have reviewed this with them and I have recommended in [address] now as well as genetic testing  We will see her  Back in 2 weeks  The patient is going to undergo induction in the near future  We had a discussion regarding breast feeding  If he has a lumpectomy she has increased risk of wound healing problems  She and her  decided to not start breast feeding which would also potentially facilitate imaging  Oncology History:        Malignant neoplasm of upper-outer quadrant of left female breast ([facility] [address] )    8/13/2019 Biopsy     Left breast ultrasound-guided biopsy  A  2 o'clock, 14 cm from nipple  Invasive mammary carcinoma of no special type (ductal, not otherwise specified)  Grade 2  ER 90  MT 60  HER2 1+    B  Left axillary lymph node  Portion of lymph node, negative for carcinoma         History of Present Illness:    this is a 49-year-old woman who recently appreciated a palpable mass in her upper outer quadrant  She had a mammogram which demonstrated no abnormalities on the right side however on the left side at the 2 o'clock position 14 cm from the nipple was the mass measuring approximately 4 8 cm on her mammogram but approximately 2 5 cm on her ultrasound  This was biopsied under ultrasound along with a lymph node    The lymph node was negative however the breast biopsy was invasive ductal carcinoma, grade 2, ER 90%, IN 60%, HER2 Dominik 1+/ negative  The patient has no significant family history of breast cancer  She presents now for an opinion regarding further management  Review of Systems:   Review of Systems   Constitutional: Negative for activity change, appetite change and fatigue  HENT: Negative  Eyes: Negative  Respiratory: Negative for cough, shortness of breath and wheezing  Cardiovascular: Negative for chest pain and leg swelling  Gastrointestinal: Negative  Endocrine: Negative  Genitourinary: Negative  Musculoskeletal:        No new changes or complaints of bone pain   Skin: Negative  Allergic/Immunologic: Negative  Neurological: Negative  Hematological: Negative  Psychiatric/Behavioral: Negative          Past Medical History:      Patient Active Problem List   Diagnosis    AMA (advanced maternal age) multigravida 27+    Previous  labor affecting pregnancy, antepartum    45 weeks gestation of pregnancy    Positive GBS test    Malignant neoplasm of upper-outer quadrant of left female breast ([address] )        Past Medical History:   Diagnosis Date    Abnormal Pap smear of cervix     HPV (human papilloma virus) infection     Rh negative state in antepartum period         Past Surgical History:   Procedure Laterality Date    COLPOSCOPY      US GUIDED BREAST BIOPSY LEFT COMPLETE Left 2019    US GUIDED BREAST LYMPH NODE BIOPSY LEFT Left 2019        Family History   Problem Relation Age of Onset    Stomach cancer Mother 37    Diabetes Maternal Grandmother     No Known Problems Father     No Known Problems Sister         Social History     Socioeconomic History    Marital status: /Civil Union     Spouse name: Not on file    Number of children: Not on file    Years of education: Not on file    Highest education level: Not on file   Occupational History    Not on file Social Needs    Financial resource strain: Not on file    Food insecurity:     Worry: Not on file     Inability: Not on file    Transportation needs:     Medical: Not on file     Non-medical: Not on file   Tobacco Use    Smoking status: Never Smoker    Smokeless tobacco: Never Used   Substance and Sexual Activity    Alcohol use: No    Drug use: No    Sexual activity: Not on file   Lifestyle    Physical activity:     Days per week: Not on file     Minutes per session: Not on file    Stress: Not on file   Relationships    Social connections:     Talks on phone: Not on file     Gets together: Not on file     Attends Hindu service: Not on file     Active member of club or organization: Not on file     Attends meetings of clubs or organizations: Not on file     Relationship status: Not on file    Intimate partner violence:     Fear of current or ex partner: Not on file     Emotionally abused: Not on file     Physically abused: Not on file     Forced sexual activity: Not on file   Other Topics Concern    Not on file   Social History Narrative    Not on file        Current Outpatient Medications:     Cholecalciferol (VITAMIN D PO), Take by mouth, Disp: , Rfl:     doxylamine (UNISOM) 25 MG tablet, Take 1 tablet by mouth, Disp: , Rfl:     folic acid (FOLVITE) 1 mg tablet, Take 1 mg by mouth daily, Disp: , Rfl:     omeprazole (PriLOSEC) 20 mg delayed release capsule, Take 20 mg by mouth daily, Disp: , Rfl:     PRENATAL 27-1 MG TABS, Take by mouth, Disp: , Rfl:     Pyridoxine HCl (VITAMIN B-6) 25 MG tablet, Take by mouth, Disp: , Rfl:     aspirin 81 MG tablet, Take 81 mg by mouth daily, Disp: , Rfl:     EPINEPHrine (EPIPEN) 0 3 mg/0 3 mL SOAJ, Inject 0 3 mL (0 3 mg total) into a muscle once for 1 dose, Disp: 0 6 mL, Rfl: 0    YADIRA 275 MG/1 1ML injection, , Disp: , Rfl:     progesterone (PROMETRIUM) 200 MG capsule, Take 200 mg by mouth 2 (two) times a day, Disp: , Rfl:      Allergies   Allergen Reactions    Bee Venom        Physical Exam:     Vitals:    08/16/19 0821   BP: 122/70   Pulse: 98   Resp: 18   Temp: 98 7 °F (37 1 °C)     Physical Exam   Constitutional: She is oriented to person, place, and time  She appears well-developed and well-nourished  HENT:   Head: Normocephalic and atraumatic  Mouth/Throat: Oropharynx is clear and moist    Eyes: Pupils are equal, round, and reactive to light  EOM are normal    Neck: Normal range of motion  Neck supple  No JVD present  No tracheal deviation present  No thyromegaly present  Cardiovascular: Normal rate, regular rhythm, normal heart sounds and intact distal pulses  Exam reveals no gallop and no friction rub  No murmur heard  Pulmonary/Chest: Effort normal and breath sounds normal  No respiratory distress  She has no wheezes  She has no rales  Examination of the  Right breast demonstrates no skin changes nipple discharge dominant masses or axillary adenopathy  Examination of the  Left breast demonstrates a dominant mass in the upper outer quadrant consistent with her imaging  There are no other dominant masses appreciated in her breast   She has 2 well-healed biopsy marks with minimal ecchymosis  I cannot appreciate any axillary adenopathy  Abdominal: Soft  She exhibits no distension and no mass  There is no hepatomegaly  There is no tenderness  There is no rebound and no guarding  Musculoskeletal: Normal range of motion  She exhibits no edema or tenderness  Lymphadenopathy:     She has no cervical adenopathy  Neurological: She is alert and oriented to person, place, and time  No cranial nerve deficit  Skin: Skin is warm and dry  No rash noted  No erythema  Psychiatric: She has a normal mood and affect  Her behavior is normal    Vitals reviewed  Results/  Discussion:     I reviewed her mammogram films her ultrasound films and her pathology there concordant    Is difficult to assess her breast secondary to her pregnancy as well as dense breast   The radiologist have recommended waiting 4 weeks for an MRI though I favor getting in a bus and possible biopsy now so that we could expedite her care  I have also recommended genetic testing which will coordinate today  Advance Care Planning/Advance Directives:  I discussed the disease status, treatment plans and follow-up with the patient

## 2019-08-16 NOTE — PROGRESS NOTES
BC Nurse Navigator:   [facility] Nurse Navigator:Navigation and Genetic visit  Met with patient and spouse for initial navigation outreach and to facilitate genetic testing  I educated on insurance/copay and lab processes for genetic testing through [employer]  Explained my role as a nurse navigator and informed her that I am not a Genetics Counselor  I advised patient that if her results are either positive or a mutation of unknown significance we will schedule her for Genetics counseling session with a certified genetics counselor with the lab company   I advised her that positive result becomes part of the permanent medical records, and might have implications if she applies for life insurance  She verbalized understanding and wants to pursue testing-explained that results turnaround are typically 7-10 days, and Dr Kevin Steele will share her results during her f/u visit in 2 weeks or sooner over the phone  Gave her literature on genetic testing, insurance/OOP averages and a card on how to reach the genetics lab for testing or billing concerns  Also provided info for testing of blood relatives through [facility]   I provided therapeutic listening and offered emotional  support to the patient  I answered all her questions at this time to her satisfaction   Pt is 38 week pregnant and scheduled for induction tomorrow  Pt was given my office contact information as well as a contact number, e-mail address and reference ID for [facility]  I provided her with written material for cancer support groups, counselors etc   Patient verbalized understanding of information and was escorted to the lab for her blood draw   Pt was encouraged to call for any questions/concerns  Will follow up with her on an as needed basis

## 2019-08-17 ENCOUNTER — ANESTHESIA EVENT (INPATIENT)
Dept: ANESTHESIOLOGY | Facility: HOSPITAL | Age: 39
End: 2019-08-17
Payer: COMMERCIAL

## 2019-08-17 ENCOUNTER — HOSPITAL ENCOUNTER (INPATIENT)
Facility: HOSPITAL | Age: 39
LOS: 2 days | Discharge: HOME | End: 2019-08-19
Attending: OBSTETRICS & GYNECOLOGY | Admitting: OBSTETRICS & GYNECOLOGY
Payer: COMMERCIAL

## 2019-08-17 ENCOUNTER — ANESTHESIA (INPATIENT)
Dept: ANESTHESIOLOGY | Facility: HOSPITAL | Age: 39
End: 2019-08-17
Payer: COMMERCIAL

## 2019-08-17 ENCOUNTER — HOSPITAL ENCOUNTER (OUTPATIENT)
Dept: LABOR AND DELIVERY | Facility: HOSPITAL | Age: 39
Discharge: HOME | End: 2019-08-17
Payer: COMMERCIAL

## 2019-08-17 DIAGNOSIS — Z3A.38 38 WEEKS GESTATION OF PREGNANCY: Primary | ICD-10-CM

## 2019-08-17 DIAGNOSIS — C50.412 MALIGNANT NEOPLASM OF UPPER-OUTER QUADRANT OF LEFT FEMALE BREAST, UNSPECIFIED ESTROGEN RECEPTOR STATUS (HCC): ICD-10-CM

## 2019-08-17 DIAGNOSIS — Z87.59 HISTORY OF POSTPARTUM DEPRESSION: ICD-10-CM

## 2019-08-17 DIAGNOSIS — Z86.59 HISTORY OF POSTPARTUM DEPRESSION: ICD-10-CM

## 2019-08-17 PROBLEM — Z3A.39 39 WEEKS GESTATION OF PREGNANCY: Status: ACTIVE | Noted: 2019-06-12

## 2019-08-17 LAB
ABO GROUP BLD: NORMAL
BASE EXCESS BLDCOA CALC-SCNC: -5.6 MMOL/L (ref 3–11)
BASE EXCESS BLDCOV CALC-SCNC: -5.7 MMOL/L (ref 1–9)
BASOPHILS # BLD AUTO: 0.05 THOUSANDS/ΜL (ref 0–0.1)
BASOPHILS NFR BLD AUTO: 1 % (ref 0–1)
BLD GP AB SCN SERPL QL: POSITIVE
BLOOD GROUP ANTIBODIES SERPL: NORMAL
EOSINOPHIL # BLD AUTO: 0.1 THOUSAND/ΜL (ref 0–0.61)
EOSINOPHIL NFR BLD AUTO: 1 % (ref 0–6)
ERYTHROCYTE [DISTWIDTH] IN BLOOD BY AUTOMATED COUNT: 13.5 % (ref 11.6–15.1)
HCO3 BLDCOA-SCNC: 21 MMOL/L (ref 17.3–27.3)
HCO3 BLDCOV-SCNC: 20.5 MMOL/L (ref 12.2–28.6)
HCT VFR BLD AUTO: 33.8 % (ref 34.8–46.1)
HGB BLD-MCNC: 11.2 G/DL (ref 11.5–15.4)
IMM GRANULOCYTES # BLD AUTO: 0.03 THOUSAND/UL (ref 0–0.2)
IMM GRANULOCYTES NFR BLD AUTO: 0 % (ref 0–2)
LYMPHOCYTES # BLD AUTO: 1.74 THOUSANDS/ΜL (ref 0.6–4.47)
LYMPHOCYTES NFR BLD AUTO: 24 % (ref 14–44)
MCH RBC QN AUTO: 30 PG (ref 26.8–34.3)
MCHC RBC AUTO-ENTMCNC: 33.1 G/DL (ref 31.4–37.4)
MCV RBC AUTO: 91 FL (ref 82–98)
MONOCYTES # BLD AUTO: 0.74 THOUSAND/ΜL (ref 0.17–1.22)
MONOCYTES NFR BLD AUTO: 10 % (ref 4–12)
NEUTROPHILS # BLD AUTO: 4.64 THOUSANDS/ΜL (ref 1.85–7.62)
NEUTS SEG NFR BLD AUTO: 64 % (ref 43–75)
NRBC BLD AUTO-RTO: 0 /100 WBCS
O2 CT VFR BLDCOA CALC: 12.3 ML/DL
OXYHGB MFR BLDCOA: 57.3 %
OXYHGB MFR BLDCOV: 64.4 %
PCO2 BLDCOA: 44.8 MM[HG] (ref 30–60)
PCO2 BLDCOV: 42.7 MM HG (ref 27–43)
PH BLDCOA: 7.29 [PH] (ref 7.23–7.43)
PH BLDCOV: 7.3 [PH] (ref 7.19–7.49)
PLATELET # BLD AUTO: 220 THOUSANDS/UL (ref 149–390)
PMV BLD AUTO: 11 FL (ref 8.9–12.7)
PO2 BLDCOA: 25.1 MM HG (ref 5–25)
PO2 BLDCOV: 28.1 MM HG (ref 15–45)
RBC # BLD AUTO: 3.73 MILLION/UL (ref 3.81–5.12)
RH BLD: NEGATIVE
SAO2 % BLDCOV: 14.2 ML/DL
SPECIMEN EXPIRATION DATE: NORMAL
WBC # BLD AUTO: 7.3 THOUSAND/UL (ref 4.31–10.16)

## 2019-08-17 PROCEDURE — 86900 BLOOD TYPING SEROLOGIC ABO: CPT | Performed by: OBSTETRICS & GYNECOLOGY

## 2019-08-17 PROCEDURE — 86870 RBC ANTIBODY IDENTIFICATION: CPT | Performed by: OBSTETRICS & GYNECOLOGY

## 2019-08-17 PROCEDURE — 86850 RBC ANTIBODY SCREEN: CPT | Performed by: OBSTETRICS & GYNECOLOGY

## 2019-08-17 PROCEDURE — 59400 OBSTETRICAL CARE: CPT | Performed by: OBSTETRICS & GYNECOLOGY

## 2019-08-17 PROCEDURE — 80081 OBSTETRIC PANEL INC HIV TSTG: CPT | Performed by: OBSTETRICS & GYNECOLOGY

## 2019-08-17 PROCEDURE — 85461 HEMOGLOBIN FETAL: CPT | Performed by: OBSTETRICS & GYNECOLOGY

## 2019-08-17 PROCEDURE — 99024 POSTOP FOLLOW-UP VISIT: CPT | Performed by: OBSTETRICS & GYNECOLOGY

## 2019-08-17 PROCEDURE — 82805 BLOOD GASES W/O2 SATURATION: CPT | Performed by: OBSTETRICS & GYNECOLOGY

## 2019-08-17 PROCEDURE — 86901 BLOOD TYPING SEROLOGIC RH(D): CPT | Performed by: OBSTETRICS & GYNECOLOGY

## 2019-08-17 PROCEDURE — NC001 PR NO CHARGE: Performed by: OBSTETRICS & GYNECOLOGY

## 2019-08-17 RX ORDER — CALCIUM CARBONATE 200(500)MG
1000 TABLET,CHEWABLE ORAL DAILY PRN
Status: DISCONTINUED | OUTPATIENT
Start: 2019-08-17 | End: 2019-08-17

## 2019-08-17 RX ORDER — DOCUSATE SODIUM 100 MG/1
100 CAPSULE, LIQUID FILLED ORAL 2 TIMES DAILY
Status: DISCONTINUED | OUTPATIENT
Start: 2019-08-17 | End: 2019-08-19 | Stop reason: HOSPADM

## 2019-08-17 RX ORDER — CALCIUM CARBONATE 200(500)MG
1000 TABLET,CHEWABLE ORAL DAILY PRN
Status: DISCONTINUED | OUTPATIENT
Start: 2019-08-17 | End: 2019-08-19 | Stop reason: HOSPADM

## 2019-08-17 RX ORDER — OXYCODONE HYDROCHLORIDE AND ACETAMINOPHEN 5; 325 MG/1; MG/1
2 TABLET ORAL EVERY 4 HOURS PRN
Status: DISCONTINUED | OUTPATIENT
Start: 2019-08-17 | End: 2019-08-19 | Stop reason: HOSPADM

## 2019-08-17 RX ORDER — ACETAMINOPHEN 325 MG/1
650 TABLET ORAL EVERY 4 HOURS PRN
Status: DISCONTINUED | OUTPATIENT
Start: 2019-08-17 | End: 2019-08-19 | Stop reason: HOSPADM

## 2019-08-17 RX ORDER — SIMETHICONE 80 MG
80 TABLET,CHEWABLE ORAL 4 TIMES DAILY PRN
Status: DISCONTINUED | OUTPATIENT
Start: 2019-08-17 | End: 2019-08-19 | Stop reason: HOSPADM

## 2019-08-17 RX ORDER — OXYTOCIN/RINGER'S LACTATE 30/500 ML
1-30 PLASTIC BAG, INJECTION (ML) INTRAVENOUS
Status: DISCONTINUED | OUTPATIENT
Start: 2019-08-17 | End: 2019-08-17

## 2019-08-17 RX ORDER — ONDANSETRON 2 MG/ML
4 INJECTION INTRAMUSCULAR; INTRAVENOUS ONCE
Status: COMPLETED | OUTPATIENT
Start: 2019-08-17 | End: 2019-08-17

## 2019-08-17 RX ORDER — LIDOCAINE HYDROCHLORIDE AND EPINEPHRINE 15; 5 MG/ML; UG/ML
INJECTION, SOLUTION EPIDURAL
Status: COMPLETED | OUTPATIENT
Start: 2019-08-17 | End: 2019-08-17

## 2019-08-17 RX ORDER — OXYTOCIN/RINGER'S LACTATE 30/500 ML
250 PLASTIC BAG, INJECTION (ML) INTRAVENOUS CONTINUOUS
Status: ACTIVE | OUTPATIENT
Start: 2019-08-17 | End: 2019-08-17

## 2019-08-17 RX ORDER — ROPIVACAINE HYDROCHLORIDE 2 MG/ML
INJECTION, SOLUTION EPIDURAL; INFILTRATION; PERINEURAL
Status: COMPLETED | OUTPATIENT
Start: 2019-08-17 | End: 2019-08-17

## 2019-08-17 RX ORDER — DIAPER,BRIEF,INFANT-TODD,DISP
1 EACH MISCELLANEOUS AS NEEDED
Status: DISCONTINUED | OUTPATIENT
Start: 2019-08-17 | End: 2019-08-19 | Stop reason: HOSPADM

## 2019-08-17 RX ORDER — IBUPROFEN 600 MG/1
600 TABLET ORAL EVERY 6 HOURS PRN
Status: DISCONTINUED | OUTPATIENT
Start: 2019-08-17 | End: 2019-08-19 | Stop reason: HOSPADM

## 2019-08-17 RX ORDER — OXYCODONE HYDROCHLORIDE AND ACETAMINOPHEN 5; 325 MG/1; MG/1
1 TABLET ORAL EVERY 4 HOURS PRN
Status: DISCONTINUED | OUTPATIENT
Start: 2019-08-17 | End: 2019-08-19 | Stop reason: HOSPADM

## 2019-08-17 RX ORDER — SENNOSIDES 8.6 MG
1 TABLET ORAL
Status: DISCONTINUED | OUTPATIENT
Start: 2019-08-17 | End: 2019-08-19 | Stop reason: HOSPADM

## 2019-08-17 RX ORDER — SODIUM CHLORIDE, SODIUM LACTATE, POTASSIUM CHLORIDE, CALCIUM CHLORIDE 600; 310; 30; 20 MG/100ML; MG/100ML; MG/100ML; MG/100ML
125 INJECTION, SOLUTION INTRAVENOUS CONTINUOUS
Status: DISCONTINUED | OUTPATIENT
Start: 2019-08-17 | End: 2019-08-17

## 2019-08-17 RX ADMIN — SODIUM CHLORIDE 2.5 MILLION UNITS: 9 INJECTION, SOLUTION INTRAVENOUS at 13:10

## 2019-08-17 RX ADMIN — WITCH HAZEL 1 PAD: 500 SOLUTION RECTAL; TOPICAL at 22:36

## 2019-08-17 RX ADMIN — Medication 2 MILLI-UNITS/MIN: at 09:57

## 2019-08-17 RX ADMIN — ROPIVACAINE HYDROCHLORIDE 8 ML: 2 INJECTION, SOLUTION EPIDURAL; INFILTRATION at 14:55

## 2019-08-17 RX ADMIN — BENZOCAINE AND LEVOMENTHOL: 200; 5 SPRAY TOPICAL at 22:36

## 2019-08-17 RX ADMIN — IBUPROFEN 600 MG: 600 TABLET ORAL at 23:52

## 2019-08-17 RX ADMIN — SODIUM CHLORIDE, SODIUM LACTATE, POTASSIUM CHLORIDE, AND CALCIUM CHLORIDE 125 ML/HR: .6; .31; .03; .02 INJECTION, SOLUTION INTRAVENOUS at 09:24

## 2019-08-17 RX ADMIN — HYDROCORTISONE 1 APPLICATION: 1 CREAM TOPICAL at 22:36

## 2019-08-17 RX ADMIN — SODIUM CHLORIDE, SODIUM LACTATE, POTASSIUM CHLORIDE, AND CALCIUM CHLORIDE 125 ML/HR: .6; .31; .03; .02 INJECTION, SOLUTION INTRAVENOUS at 15:11

## 2019-08-17 RX ADMIN — LIDOCAINE HYDROCHLORIDE AND EPINEPHRINE 5 ML: 15; 5 INJECTION, SOLUTION EPIDURAL at 14:55

## 2019-08-17 RX ADMIN — ONDANSETRON 4 MG: 2 INJECTION INTRAMUSCULAR; INTRAVENOUS at 16:19

## 2019-08-17 RX ADMIN — ROPIVACAINE HYDROCHLORIDE: 2 INJECTION, SOLUTION EPIDURAL; INFILTRATION at 16:04

## 2019-08-17 RX ADMIN — SODIUM CHLORIDE 5 MILLION UNITS: 0.9 INJECTION, SOLUTION INTRAVENOUS at 09:21

## 2019-08-17 NOTE — ANESTHESIA PROCEDURE NOTES
Epidural Block    Patient location during procedure: OB  Start time: 8/17/2019 2:55 PM  Reason for block: procedure for pain and at surgeon's request  Staffing  Anesthesiologist: Yaneth Hahn MD  Performed: anesthesiologist   Preanesthetic Checklist  Completed: patient identified, site marked, surgical consent, pre-op evaluation, timeout performed, IV checked, risks and benefits discussed and monitors and equipment checked  Epidural  Patient position: sitting  Prep: Betadine  Patient monitoring: frequent blood pressure checks and continuous pulse ox  Approach: midline  Location: lumbar (1-5)  Injection technique: DILCIA air  Needle  Needle type: Tuohy   Needle gauge: 18 G  Catheter type: side hole  Catheter size: 20 G  Catheter at skin depth: 11 cm  Test dose: negativelidocaine 1 5% with epinephrine 1:200,000 test dose, 5 mL  ropivacaine (NAROPIN) 0 2% epidural injection, 8 mL  Assessment  Sensory level: [id number] aspiration for CSF, negative aspiration for heme and no paresthesia on injection  patient tolerated the procedure well with no immediate complications

## 2019-08-17 NOTE — ANESTHESIA PREPROCEDURE EVALUATION
Review of Systems/Medical History  Patient summary reviewed        Cardiovascular  Negative cardio ROS    Pulmonary  Negative pulmonary ROS        GI/Hepatic  Negative GI/hepatic ROS          Negative  ROS        Endo/Other  Negative endo/other ROS      GYN  Negative gynecology ROS          Hematology  Negative hematology ROS      Musculoskeletal  Negative musculoskeletal ROS        Neurology  Negative neurology ROS      Psychology   Negative psychology ROS              Physical Exam    Airway    Mallampati score: I  TM Distance: >3 FB  Neck ROM: full     Dental       Cardiovascular  Comment: Negative ROS, Cardiovascular exam normal    Pulmonary  Pulmonary exam normal     Other Findings        Anesthesia Plan  ASA Score- 2     Anesthesia Type- epidural with ASA Monitors  Additional Monitors:   Airway Plan:         Plan Factors-    Induction- intravenous  Postoperative Plan-     Informed Consent- Anesthetic plan and risks discussed with patient  I personally reviewed this patient with the CRNA  Discussed and agreed on the Anesthesia Plan with the CRNA  Adolph Love

## 2019-08-17 NOTE — CONSULTS
[address] [place] [address] 37 y o  female MRN: [id number]  Unit/Bed#: -01 Encounter: [id number]    History of Present Illness     Inpatient consult to Neonatology  Consult performed by: Vasiliy Jean MD  Consult ordered by: Aliyah Malone MD          HPI: Joseph Cr is a 37y o  year old  female at 36w3d with an ABISAI of 2019, by Last Menstrual Period who presents at 44 weeks for induction of labor for malignant neoplasm of outer quadrant of left breast  She has the following prenatal labs: B neg/HBsAg neg/ HIV neg/ RPR neg/GBS positive on Penicillin     Pregnancy complications: advanced maternal age,  history of malignant neoplasia on upper outer quadrant of left breast     Fetal Complications: none  Maternal medical history and medications: breast cancer    Maternal social history: none    Marital status:     Maternal  medications:   Historical Information   Past Medical History:   Diagnosis Date    Abnormal Pap smear of cervix     HPV (human papilloma virus) infection     Rh negative state in antepartum period      Past Surgical History:   Procedure Laterality Date    COLPOSCOPY      US GUIDED BREAST BIOPSY LEFT COMPLETE Left 2019    US GUIDED BREAST LYMPH NODE BIOPSY LEFT Left 2019     Social History     Tobacco Use   Smoking Status Never Smoker   Smokeless Tobacco Never Used     OB History:   #: 1, Date: None, Sex: None, Weight: None, GA: None, Delivery: None, Apgar1: None, Apgar5: None, Living: None, Birth Comments: None    #: 2, Date: 18, Sex: Female, Weight: 2515 g (5 lb 8 7 oz), GA: 35w3d, Delivery: Vaginal, Spontaneous, Apgar1: 8, Apgar5: 9, Living: Living, Birth Comments: None    #: 3, Date: None, Sex: None, Weight: None, GA: None, Delivery: None, Apgar1: None, Apgar5: None, Living: None, Birth Comments: None    Family History: non-contributory    Meds/Allergies   all current active meds have been reviewed    Allergies Allergen Reactions    Bee Venom        Objective     Intake/Output Summary (Last 24 hours) at 2019 1612  Last data filed at 2019 1510  Gross per 24 hour   Intake 999 ml   Output    Net 999 ml     Invasive Devices     Peripheral Intravenous Line            Peripheral IV 19 Left Forearm less than 1 day          Epidural Line            Epidural Catheter 19 less than 1 day                Lab Results: I have personally reviewed pertinent reports  Imaging Studies: I have personally reviewed pertinent reports  EKG, Pathology, and Other Studies: I have personally reviewed pertinent reports  VTE Prophylaxis: Sequential compression device (Venodyne)     Code Status: Level 1 - Full Code  Advance Directive and Living Will:      Power of :    POLST:      Counseling / Coordination of Care  Total floor / unit time spent today 40  minutes  Greater than 50% of total time was spent with the patient and / or family counseling and / or coordination of care  A description of the counseling / coordination of care:     I had the pleasure of talking to ms Luciano Grande in the presence of her  about breast feeding of his  baby before surgery of her breast cancer  She is a  37y o  year old  female at 36w3d with an ABISAI of 2019, by Last Menstrual Period who presents at 44 weeks for induction of labor for malignant neoplasm of outer quadrant of left breast  She intends to breast feed her baby as long that will not compromise her breast cancer surgery  As per patient surgery is scheduled 1-2 weeks post delivery, and surgeon wants the breast not to produce milk at the time of surgery  I advised her that she might give collustrom to the baby for few days and stop breast feeding all together after that  The lactation specialist is also ok with that advice  She asked if it was me what will I do   I told her that I will not give any milk, to minimize any possibility of milk production at the time of surgery  The plan is not to breast feed after her breast cancer surgery as she will require radiation and hormone suppressive therapy   All her questions were answered and she understood everything       Assessment:     - 37y o  year old  female at 36w3d    - malignant neoplasm of outer quadrant of left breast   - Rh neg  - GBS +     Plan:  Induction of labor as per OB   Call NICU when needed     Consult time 40 minutes

## 2019-08-17 NOTE — OB LABOR/OXYTOCIN SAFETY PROGRESS
Oxytocin Safety Progress Check Note - Renetta Flor 37 y o  female MRN: [id number]    Unit/Bed#: -01 Encounter: [id number]    OB History    Para Term  AB Living   3 2 1 1 0 1   SAB TAB Ectopic Multiple Live Births   0 0 0 0 1   Obstetric Comments   Age of menarche 13   Age at first live birth 34     Gestational Age: 36w3d  Dose (annelise-units/min) Oxytocin: 15 annelise-units/min  Contraction Frequency (minutes): 2-3  Contraction Quality: Mild  Tachysystole: No   Dilation: 4        Effacement (%): 60  Station: -3  Baseline Rate: 130 bpm  Fetal Heart Rate: 148 BPM  FHR Category: Category I          Notes/comments:   Pt making  cervical change to 4 cm     FHT: category I, ctx q 5-7min  She will like the Epidural now  We will SROM in next check   Will recheck pt in 2 hours, sooner if patient uncomfortable            Alyce King MD 2019 2:14 PM

## 2019-08-17 NOTE — PLAN OF CARE
Problem: PAIN - ADULT  Goal: Verbalizes/displays adequate comfort level or baseline comfort level  Description  Interventions:  - Encourage patient to monitor pain and request assistance  - Assess pain using appropriate pain scale  - Administer analgesics based on type and severity of pain and evaluate response  - Implement non-pharmacological measures as appropriate and evaluate response  - Consider cultural and social influences on pain and pain management  - Notify physician/advanced practitioner if interventions unsuccessful or patient reports new pain  Outcome: Progressing     Problem: INFECTION - ADULT  Goal: Absence or prevention of progression during hospitalization  Description  INTERVENTIONS:  - Assess and monitor for signs and symptoms of infection  - Monitor lab/diagnostic results  - Monitor all insertion sites, i e  indwelling lines, tubes, and drains  - Monitor endotracheal if appropriate and nasal secretions for changes in amount and color  - [place] appropriate cooling/warming therapies per order  - Administer medications as ordered  - Instruct and encourage patient and family to use good hand hygiene technique  - Identify and instruct in appropriate isolation precautions for identified infection/condition  Outcome: Progressing  Goal: Absence of fever/infection during neutropenic period  Description  INTERVENTIONS:  - Monitor WBC    Outcome: Progressing     Problem: SAFETY ADULT  Goal: Patient will remain free of falls  Description  INTERVENTIONS:  - Assess patient frequently for physical needs  -  Identify cognitive and physical deficits and behaviors that affect risk of falls    -  [place] fall precautions as indicated by assessment   - Educate patient/family on patient safety including physical limitations  - Instruct patient to call for assistance with activity based on assessment  - Modify environment to reduce risk of injury  - Consider OT/PT consult to assist with strengthening/mobility  Outcome: Progressing  Goal: Maintain or return to baseline ADL function  Description  INTERVENTIONS:  -  Assess patient's ability to carry out ADLs; assess patient's baseline for ADL function and identify physical deficits which impact ability to perform ADLs (bathing, care of mouth/teeth, toileting, grooming, dressing, etc )  - Assess/evaluate cause of self-care deficits   - Assess range of motion  - Assess patient's mobility; develop plan if impaired  - Assess patient's need for assistive devices and provide as appropriate  - Encourage maximum independence but intervene and supervise when necessary  - Involve family in performance of ADLs  - Assess for home care needs following discharge   - Consider OT consult to assist with ADL evaluation and planning for discharge  - Provide patient education as appropriate  Outcome: Progressing  Goal: Maintain or return mobility status to optimal level  Description  INTERVENTIONS:  - Assess patient's baseline mobility status (ambulation, transfers, stairs, etc )    - Identify cognitive and physical deficits and behaviors that affect mobility  - Identify mobility aids required to assist with transfers and/or ambulation (gait belt, sit-to-stand, lift, walker, cane, etc )  - [place] fall precautions as indicated by assessment  - Record patient progress and toleration of activity level on Mobility SBAR; progress patient to next Phase/Stage  - Instruct patient to call for assistance with activity based on assessment  - Consider rehabilitation consult to assist with strengthening/weightbearing, etc   Outcome: Progressing     Problem: Knowledge Deficit  Goal: Patient/family/caregiver demonstrates understanding of disease process, treatment plan, medications, and discharge instructions  Description  Complete learning assessment and assess knowledge base    Interventions:  - Provide teaching at level of understanding  - Provide teaching via preferred learning methods  Outcome: Progressing  Goal: Verbalizes understanding of labor plan  Description  Assess patient/family/caregiver's baseline knowledge level and ability to understand information  Provide education via patient/family/caregiver's preferred learning method at appropriate level of understanding  1  Provide teaching at level of understanding  2  Provide teaching via preferred learning method(s)  Outcome: Progressing     Problem: DISCHARGE PLANNING  Goal: Discharge to home or other facility with appropriate resources  Description  INTERVENTIONS:  - Identify barriers to discharge w/patient and caregiver  - Arrange for needed discharge resources and transportation as appropriate  - Identify discharge learning needs (meds, wound care, etc )  - Arrange for interpretive services to assist at discharge as needed  - Refer to Case Management Department for coordinating discharge planning if the patient needs post-hospital services based on physician/advanced practitioner order or complex needs related to functional status, cognitive ability, or social support system  Outcome: Progressing     Problem: Labor & Delivery  Goal: Manages discomfort  Description  Assess and monitor for signs and symptoms of discomfort  Assess patient's pain level regularly and per hospital policy  Administer medications as ordered  Support use of nonpharmacological methods to help control pain such as distraction, imagery, relaxation, and application of heat and cold  Collaborate with interdisciplinary team and patient to determine appropriate pain management plan  1  Include patient in decisions related to comfort  2  Offer non-pharmacological pain management interventions  3  Report ineffective pain management to physician  Outcome: Progressing  Goal: Patient vital signs are stable  Description  1  Assess vital signs - vaginal delivery    Outcome: Progressing

## 2019-08-17 NOTE — PROGRESS NOTES
Called to room for chest/rib discomfort  Pt tolerated cervical exam well  Patient describes pain at ribs as a gas bubble or a punch  Unknown if this is due to epidural   I will decreased epidural rate and bolus dose to rule this out  Patient is comfortable with contractions

## 2019-08-17 NOTE — H&P
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 and a previous  labor at 35 weeks, using oral progesterone for [place]   GBS positive  RH: B -      Contractions: no  Loss of fluid: no  Vaginal bleeding: no  Fetal movement: yes    She is DR MELARA patient  PREGNANCY COMPLICATIONS:   1) breast cancer    OB History    Para Term  AB Living   3 2 1 1 0 1   SAB TAB Ectopic Multiple Live Births   0 0 0 0 1      # Outcome Date GA Lbr Lokesh/2nd Weight Sex Delivery Anes PTL Lv   3 Current            2  18 35w3d / 00:21 2515 g (5 lb 8 7 oz) F Vag-Spont EPI Y HEAVEN   1 Term               Obstetric Comments   Age of menarche 13   Age at first live birth 34       Baby complications/comments:   Last estimated fetal weight (7/10/2019) on the percentile 32 , 2163 g    Review of Systems   Constitutional: Negative  HENT: Negative  Eyes: Negative  Respiratory: Negative  Cardiovascular: Negative  Gastrointestinal: Negative  Endocrine: Negative  Genitourinary: Negative  Musculoskeletal: Negative  Allergic/Immunologic: Negative  Neurological: Negative  Hematological: Negative  Psychiatric/Behavioral: Negative            Historical Information   Past Medical History:   Diagnosis Date    Abnormal Pap smear of cervix     HPV (human papilloma virus) infection     Rh negative state in antepartum period      Past Surgical History:   Procedure Laterality Date    COLPOSCOPY      US GUIDED BREAST BIOPSY LEFT COMPLETE Left 8/13/2019    US GUIDED BREAST LYMPH NODE BIOPSY LEFT Left 8/13/2019     Social History   Social History     Substance and Sexual Activity   Alcohol Use No     Social History     Substance and Sexual Activity   Drug Use No     Social History     Tobacco Use   Smoking Status Never Smoker   Smokeless Tobacco Never Used     Family History: non-contributory    Meds/Allergies      Medications Prior to Admission   Medication    Cholecalciferol (VITAMIN D PO)    doxylamine (UNISOM) 25 MG tablet    folic acid (FOLVITE) 1 mg tablet    omeprazole (PriLOSEC) 20 mg delayed release capsule    PRENATAL 27-1 MG TABS    Pyridoxine HCl (VITAMIN B-6) 25 MG tablet    EPINEPHrine (EPIPEN) 0 3 mg/0 3 mL SOAJ        Allergies   Allergen Reactions    Bee Venom        OBJECTIVE:    Vitals: Blood pressure 104/52, pulse 71, temperature 98 1 °F (36 7 °C), temperature source Oral, resp  rate 16, last menstrual period 11/16/2018, currently breastfeeding  There is no height or weight on file to calculate BMI  Physical Exam   Constitutional: She is oriented to person, place, and time  She appears well-developed and well-nourished  HENT:   Head: Normocephalic and atraumatic  Eyes: Pupils are equal, round, and reactive to light  Neck: Normal range of motion  Cardiovascular: Normal rate, regular rhythm, normal heart sounds and intact distal pulses  Pulmonary/Chest: Effort normal and breath sounds normal    Musculoskeletal: Normal range of motion  Neurological: She is alert and oriented to person, place, and time  Psychiatric: She has a normal mood and affect         Cervix:  Dilation: 3  Effacement (%): 60  Station: -3  Cervical Characteristics: Anterior  Membranes:    Fetal heart rate:   Baseline Rate: 130 bpm  Variability: Moderate 6-25 bpm  Accelerations: 15 x 15 or greater, At variable times  Decelerations: None  FHR Category: Category I  EFW: 7 5 pounds  Fetal presentation: vertex     [place]: Contraction Frequency (minutes): 2-3  Contraction Duration (seconds): 50-60  Contraction Quality: Mild    GBS: positive    Prenatal Labs:   Blood Type:   Lab Results   Component Value Date/Time    ABO Grouping B 08/17/2019 09:21 AM     , D (Rh type):   Lab Results   Component Value Date/Time    Rh Factor Negative 08/17/2019 09:21 AM     , Antibody Screen: No results found for: ANTIBODYSCR , HCT/HGB:   Lab Results   Component Value Date/Time    Hematocrit 33 8 (L) 08/17/2019 09:21 AM    Hemoglobin 11 2 (L) 08/17/2019 09:21 AM      , MCV:   Lab Results   Component Value Date/Time    MCV 91 08/17/2019 09:21 AM      , Platelets:   Lab Results   Component Value Date/Time    Platelets 535 37/16/3850 09:21 AM      , 1 hour Glucola:   Lab Results   Component Value Date/Time    Glucose 93 07/03/2019 03:23 PM   , 3 hour GTT: No results found for: FFONUBL9ND, Varicella: No results found for: VARICELLAIGG    , Rubella:   Lab Results   Component Value Date/Time    Rubella IgG Quant >175 0 12/22/2017 01:48 PM        , VDRL/RPR:   Lab Results   Component Value Date/Time    RPR Non-Reactive 07/03/2019 03:23 PM      , Urine Culture/Screen:   Lab Results   Component Value Date/Time    Urine Culture <10,000 cfu/ml  12/22/2017 01:48 PM       , Urine Drug Screen: No results found for: AMPHETUR, BARBTUR, BDZUR, THCUR, COCAINEUR, METHADONEUR, OPIATEUR, PCPUR, MTHAMUR, ECSTASYUR, TRICYCLICSUR, Hep B:   Lab Results   Component Value Date/Time    Hepatitis B Surface Ag Non-reactive 12/22/2017 01:48 PM     , Hep C: No components found for: HEPCSAG, EXTHEPCSAG   , HIV:   Lab Results   Component Value Date/Time    HIV-1/HIV-2 Ab Non-Reactive 12/22/2017 01:48 PM     , Chlamydia: No results found for: EXTCHLAMYDIA  , Gonorrhea:   Lab Results   Component Value Date/Time    N gonorrhoeae, DNA Probe Negative 01/30/2019 08:58 PM    N gonorrhoeae, DNA Probe N  gonorrhoeae Amplified DNA Negative 12/28/2017 04:13 PM     , Group B Strep:    Lab Results Component Value Date/Time    Strep Grp B PCR Positive for Beta Hemolytic Strep Grp B by PCR (A) 2019 05:11 PM          Invasive Devices     Peripheral Intravenous Line            Peripheral IV 19 Left Forearm less than 1 day                  Assessment/Plan     ASSESSMENT:  yo  at 39w1d weeks gestation who is being admitted for elective induction of labor  Pregnancy complicated for advanced maternal age, following up with NSTs by MFM, history of  delivery at 27 weeks, taking oral progesterone from [place]  Recent diagnosis of  invasive mammary carcinoma left breast, she has been seen by oncology  In addition she is GBS positive, was started  Penicillin, Rh negative we will administrated Rhogam in her postpartum  PLAN:    - Admit to Labor and delivery  - CBC, RPR, Blood Type ordered   - Start with oxytocin   - GBS + status:  PCN for prophylaxis   - Analgesia and/or epidural at patient request  - Anticipate       Discussed with Dr Shun Hoffman      This patient will be an INPATIENT  and I certify the anticipated length of stay is >2 Midnights      Arvind Lundberg MD  2019  1:40 PM

## 2019-08-17 NOTE — OB LABOR/OXYTOCIN SAFETY PROGRESS
Oxytocin Safety Progress Check Note - Chemo Burnett 37 y o  female MRN: [id number]    Unit/Bed#: -01 Encounter: [id number]    OB History    Para Term  AB Living   3 2 1 1 0 1   SAB TAB Ectopic Multiple Live Births   0 0 0 0 1   Obstetric Comments   Age of menarche 13   Age at first live birth 34     Gestational Age: 36w3d  Dose (annelise-units/min) Oxytocin: 10 annelise-units/min  Contraction Frequency (minutes): 2-3  Contraction Quality: Mild  Tachysystole: No   Dilation: 7        Effacement (%): 80  Station: -2  Baseline Rate: 125 bpm  Fetal Heart Rate: 130 BPM  FHR Category: Category II          Notes/comments:   Pt getting more uncomfortable  Occasional variable decelerations appreciated over last 20 minutes; if persist despite decrease in pitocin, maternal position changes, and fluid bolus, will place IUPC and amnioinfuse    FHT Cat II with variable decelerations, cont to closely monitor  Pitocin decreased from 18 to 10  Dr Orlando Moncada aware and on his way in          [facility], DO 2019 3:52 PM

## 2019-08-17 NOTE — ANESTHESIA POSTPROCEDURE EVALUATION
Post-Op Assessment Note    CV Status:  Stable  Pain Score: 1    Pain management: adequate     Mental Status:  Alert and awake   Hydration Status:  Euvolemic   PONV Controlled:  Controlled   Airway Patency:  Patent   Post Op Vitals Reviewed: Yes      Staff: Anesthesiologist     Post-op block assessment: no complications        BP      Temp      Pulse     Resp      SpO2

## 2019-08-18 LAB
ABO GROUP BLD: NORMAL
BLD GP AB SCN SERPL QL: POSITIVE
FETAL CELL SCN BLD QL ROSETTE: NEGATIVE
HBV SURFACE AG SER QL: NORMAL
HIV 1+2 AB+HIV1 P24 AG SERPL QL IA: NORMAL
HIV1 P24 AG SER QL: NORMAL
RH BLD: NEGATIVE
RUBV IGG SERPL IA-ACNC: >175 IU/ML

## 2019-08-18 PROCEDURE — 0KQM0ZZ REPAIR PERINEUM MUSCLE, OPEN APPROACH: ICD-10-PCS | Performed by: OBSTETRICS & GYNECOLOGY

## 2019-08-18 PROCEDURE — 87806 HIV AG W/HIV1&2 ANTB W/OPTIC: CPT | Performed by: OBSTETRICS & GYNECOLOGY

## 2019-08-18 PROCEDURE — 99024 POSTOP FOLLOW-UP VISIT: CPT | Performed by: OBSTETRICS & GYNECOLOGY

## 2019-08-18 RX ADMIN — DOCUSATE SODIUM 100 MG: 100 CAPSULE, LIQUID FILLED ORAL at 10:52

## 2019-08-18 RX ADMIN — DOCUSATE SODIUM 100 MG: 100 CAPSULE, LIQUID FILLED ORAL at 18:43

## 2019-08-18 RX ADMIN — HUMAN RHO(D) IMMUNE GLOBULIN 300 MCG: 300 INJECTION, SOLUTION INTRAMUSCULAR at 18:44

## 2019-08-18 RX ADMIN — IBUPROFEN 600 MG: 600 TABLET ORAL at 23:42

## 2019-08-18 RX ADMIN — IBUPROFEN 600 MG: 600 TABLET ORAL at 10:52

## 2019-08-18 RX ADMIN — OXYCODONE HYDROCHLORIDE AND ACETAMINOPHEN 2 TABLET: 5; 325 TABLET ORAL at 05:02

## 2019-08-18 RX ADMIN — IBUPROFEN 600 MG: 600 TABLET ORAL at 18:43

## 2019-08-18 RX ADMIN — ENOXAPARIN SODIUM 40 MG: 40 INJECTION SUBCUTANEOUS at 15:30

## 2019-08-18 NOTE — LACTATION NOTE
This note was copied from a baby's chart  Called to speak with pt re her decision about breastfeeding due to her very recent breast cancer diagnosis  Pt appears very conflicted about her original decision to not breastfeed  Reviewed with pt that weather or not she puts infant to breast, her milk will come in at about day 2-5  Reviewed ways to stop milk flow  Discussed all viable options with the understanding that her physician has final say  Pt has decided to put infant to non affected breast after delivery to receive some colostrum, then formula feed  Support given for decision

## 2019-08-18 NOTE — DISCHARGE SUMMARY
Discharge Summary -   Mikey Ramirez 37 y o  female MRN: [id number]  Unit/Bed#: -01 Encounter: [id number]    Admission Date: 2019     Discharge Date: 2019    Admitting Attending: Jaime Medina MD  Delivering Attending: Jaime Medina MD  Discharging Attending: Jaime Medina MD     Principal Diagnosis:   Term pregnancy at 39w1d  Induced labor  Single fetus  SROM  GBS positive, adequately treated  Advanced maternal age    Secondary Diagnosis:   Recently diagnosed breast cancer    Procedures: Forceps assisted vaginal delivery  Second degree perineal laceration repair    Hospital Course:   Mikey Ramirez is a 37 y o   at 36w3d who was initially admitted for elective induction of labor  She delivered a viable female  on 19 at [phone] [id number]  Weight 7lbs 1oz via normal spontaneous vaginal delivery,with forceps assistance  She sustained a second degree perineal laceration during delivery which was adequately repaired  Apgars were 8 (1 min) and 9 (5 min)   was transferred to  nursery  Patient tolerated the procedure well  Her post-delivery course was uncomplicated  Postpartum hemaglobin was 11 2  Her postpartum pain was well controlled with oral analgesics  On day of discharge, she was ambulating and able to reasonably perform all ADLs  She was voiding and had appropriate bowel function  Pain was well controlled  She was discharged home on postpartum day #2 without complications  Patient was instructed to follow up with her OB as an outpatient and was given appropriate warnings to call provider if she develops signs of infection or uncontrolled pain  Complications: None    Condition at discharge: stable     Discharge Medications: For a complete list of the patient's medications, please refer to her medical reconcillation report  Discharge instructions/Information to patient and family:   See after visit summary for information provided to patient and family  Provisions for Follow-Up Care:  See after visit summary for information related to follow-up care and any pertinent home health orders  Disposition: See After Visit Summary for discharge disposition information      Planned Readmission: No

## 2019-08-18 NOTE — L&D DELIVERY NOTE
Delivery Summary - OB/GYN   Yvonne Hicks 37 y o  female MRN: [id number]  Unit/Bed#: -01 Encounter: [id number]    Pre-delivery Diagnosis:   1  39w1d pregnancy  2  Induced labor  3  GBS positive, adequately treated  4  Advanced maternal age  11  Hx previous  delivery  6  Rh negative  7  Newly diagnosed malignant neoplasm of left breast    Post-delivery Diagnosis: same, delivered    Attending: Dr Sugar Saenz    Assistant(s): Dr Conchita Moore, Dr Scarlet Clark    Procedure: Forcep-assisted vaginal delivery    Anesthesia: Epidural    Estimated Blood Loss:  300 mL, QBL pending    Specimens:   1  Arterial and venous cord gases  2  Cord blood  3  Segment of umbilical cord  4  Placenta to storage     Complications:  None apparent    Findings:  1  Viable female  delivered at 1653 weight 7lbs 1oz;  Apgar scores of 8 at one minute and 9 at five minutes  2  Spontaneous delivery of placenta with centrally inserted 3-vessel cord  3  Clear amniotic fluid  4  2nd degree perineal laceration, repaired with 3-0Vicryl rapid  5  Arterial cord pH 7 289, base excess -5 6  6  Venous cord pH 7 300, base excess -5 7       Disposition: Patient tolerated the procedure well and was recovering in labor and delivery room with family and  before being transferred to the post-partum floor  Procedure Details     Description of procedure  Yvonne Hicks is a 39yo G3 now  presenting for scheduled elective induction of labor at 39w1d  She was found to be 3cm dilated, 60% effaced, and at -3 station upon admission  She received PCN for GBS+ status  She received pitocin for labor induction  She made steady change until reaching complete at 1630 and pushing at 1636  Indication for forceps was suspicion of fetal compromise  Patient was complete and +3 station, baby was direct occiput posterior  Patient was robby regularly, FHT with deep variable decelerations noted   Patient was felt to have high likelihood for successful operative vaginal delivery    Patient was informed of risks and benefits of the procedure  Injury to that vaginal, cervix, urethra, bladder, rectum, and perineum  The patient was counseled on fetal risks including bruising, intracranial hemorrhage, facial, and brachial plexus nerve injuries, and complications of shoulder dystocia  The patient's best understanding of the risks involved, all questions were answered and the patient was consented to the procedure  The patient is bladder was emptied to avoid injury, adequate anesthesia was noted and the patient was in dorsal lithotomy position  The fetal position was checked and reconfirmed to be direct occiput posterior at +3 fetal station  Toni Turner Forceps were used  The posterior blade was inserted first followed by the anterior blade along the occipital omental diameter and equidistance from the sagittal suture  There was one fingerbreadth between the shanks and the posterior fontanelle  The plates were then articulated traction was applied with start of a contraction  Advancement of the fetal head was noted with each traction and a total number of 2 pulls were necessary  The forceps was released upon  of the fetal head and the delivery was performed after the assistance of maternal expulsive efforts  After pushing for 17 minutes, at 1653 patient delivered a viable female , weighing 7lbs 1oz, Apgars of 8 (1 min) and 9 (5 min)  The fetal vertex delivered spontaneously  There was no nuchal cord  The anterior right shoulder delivered atraumatically with maternal expulsive forces and the assistance of gentle downward traction  The posterior left shoulder delivered with maternal expulsive forces and the assistance of gentle upward traction  The remainder of the fetus delivered spontaneously  Upon delivery, the infant was placed on the mothers abdomen and the cord was clamped and cut   The infant was noted to cry spontaneously and was moving all extremities appropriately  There was no evidence for injury  Awaiting nurse resuscitators evaluated the  at bedside  Arterial and venous cord blood gases and cord blood was collected for analysis  These were promptly sent to the lab  In the immediate post-partum, 30 units of IV pitocin was administered and the uterus was noted to contract down well with massage and pitocin  The placenta delivered spontaneously at 1700 and was noted to have a centrally inserted 3 vessel cord  The vagina, cervix, and perineum were inspected and there was noted to be a second degree perineal laceration  Laceration Repair  Patient was comfortable with epidural at that time  A second degree perineal laceration was identified and required repair  Laceration was repaired with 3-0 Vicryl rapide in single running suture to reapproximate the laceration  Good hemostasis was confirmed at the conclusion of this procedure  At the conclusion of the delivery, all needle, sponge, and instrument counts were noted to be correct  Patient tolerated the procedure well and was allowed to recover in labor and delivery room with family and  before being transferred to the post-partum floor  Dr Debra Mauro was present and participated in all key portions of the case        Aleksandra Smith DO  OB/GYN, PGY4  2019, 11:43 PM

## 2019-08-18 NOTE — PROGRESS NOTES
Progress Note - OB/GYN   Kailee Santana 37 y o  female MRN: [id number]  Unit/Bed#: -01 Encounter: [id number]    Assessment:  Post partum Day #1 s/p FAVD, stable, baby in room  Term pregnancy   Single female viable fetus  Pregnancy complicated by  - AMA  - Recent diagnosis malignant neoplasia on left breast   - B negative, Antibodies positive, she received 1 dose at 28 weeks ,     baby O+, Rhogam ordered  - History of postpartum depression , Her  is worry about go home without a physiquiatry evaluation, consult is pending  Plan:  Continue routine post partum care  Encourage ambulation  Encourage breastfeeding  Anticipate discharge tomorrow if not complications     Subjective/Objective   Chief Complaint:     Post delivery  Patient is doing well  Lochia WNL  She states pain in perineal area that improves with percocet  Subjective:     Pain: yes, cramping, improved with meds  Tolerating PO: yes  Voiding: yes  Flatus: yes  BM: no  Ambulating: yes  Chest pain: no  Shortness of breath: no  Leg pain: no  Lochia: minimal    Objective:     Vitals: /63 (BP Location: Left arm)   Pulse 72   Temp 98 1 °F (36 7 °C) (Oral)   Resp 20   Ht 5' 10" (1 778 m)   Wt 92 5 kg (203 lb 14 8 oz)   LMP 11/16/2018 (Approximate)   SpO2 100%   Breastfeeding? Unknown   BMI 29 26 kg/m²       Intake/Output Summary (Last 24 hours) at 8/18/2019 0121  Last data filed at 8/18/2019 0030  Gross per 24 hour   Intake 1726 08 ml   Output 1512 ml   Net 214 08 ml       Lab Results   Component Value Date    WBC 7 30 08/17/2019    HGB 11 2 (L) 08/17/2019    HCT 33 8 (L) 08/17/2019    MCV 91 08/17/2019     08/17/2019       Physical Exam:     Gen: AAOx3, NAD  CV: RRR  Lungs: CTA b/l  Abd: Soft, non-tender, non-distended, no rebound or guarding  Uterine fundus firm and non-tender, 1 cm bellow the umbilicus     Ext: Non tender    Shima Lazar MD  8/18/2019  1:21 AM

## 2019-08-18 NOTE — PLAN OF CARE
Problem: PAIN - ADULT  Goal: Verbalizes/displays adequate comfort level or baseline comfort level  Description  Interventions:  - Encourage patient to monitor pain and request assistance  - Assess pain using appropriate pain scale  - Administer analgesics based on type and severity of pain and evaluate response  - Implement non-pharmacological measures as appropriate and evaluate response  - Consider cultural and social influences on pain and pain management  - Notify physician/advanced practitioner if interventions unsuccessful or patient reports new pain  Outcome: Progressing     Problem: INFECTION - ADULT  Goal: Absence or prevention of progression during hospitalization  Description  INTERVENTIONS:  - Assess and monitor for signs and symptoms of infection  - Monitor lab/diagnostic results  - Monitor all insertion sites, i e  indwelling lines, tubes, and drains  - Monitor endotracheal if appropriate and nasal secretions for changes in amount and color  - [place] appropriate cooling/warming therapies per order  - Administer medications as ordered  - Instruct and encourage patient and family to use good hand hygiene technique  - Identify and instruct in appropriate isolation precautions for identified infection/condition  Outcome: Progressing  Goal: Absence of fever/infection during neutropenic period  Description  INTERVENTIONS:  - Monitor WBC    Outcome: Progressing     Problem: SAFETY ADULT  Goal: Patient will remain free of falls  Description  INTERVENTIONS:  - Assess patient frequently for physical needs  -  Identify cognitive and physical deficits and behaviors that affect risk of falls    -  [place] fall precautions as indicated by assessment   - Educate patient/family on patient safety including physical limitations  - Instruct patient to call for assistance with activity based on assessment  - Modify environment to reduce risk of injury  - Consider OT/PT consult to assist with strengthening/mobility  Outcome: Progressing  Goal: Maintain or return to baseline ADL function  Description  INTERVENTIONS:  -  Assess patient's ability to carry out ADLs; assess patient's baseline for ADL function and identify physical deficits which impact ability to perform ADLs (bathing, care of mouth/teeth, toileting, grooming, dressing, etc )  - Assess/evaluate cause of self-care deficits   - Assess range of motion  - Assess patient's mobility; develop plan if impaired  - Assess patient's need for assistive devices and provide as appropriate  - Encourage maximum independence but intervene and supervise when necessary  - Involve family in performance of ADLs  - Assess for home care needs following discharge   - Consider OT consult to assist with ADL evaluation and planning for discharge  - Provide patient education as appropriate  Outcome: Progressing  Goal: Maintain or return mobility status to optimal level  Description  INTERVENTIONS:  - Assess patient's baseline mobility status (ambulation, transfers, stairs, etc )    - Identify cognitive and physical deficits and behaviors that affect mobility  - Identify mobility aids required to assist with transfers and/or ambulation (gait belt, sit-to-stand, lift, walker, cane, etc )  - [place] fall precautions as indicated by assessment  - Record patient progress and toleration of activity level on Mobility SBAR; progress patient to next Phase/Stage  - Instruct patient to call for assistance with activity based on assessment  - Consider rehabilitation consult to assist with strengthening/weightbearing, etc   Outcome: Progressing     Problem: DISCHARGE PLANNING  Goal: Discharge to home or other facility with appropriate resources  Description  INTERVENTIONS:  - Identify barriers to discharge w/patient and caregiver  - Arrange for needed discharge resources and transportation as appropriate  - Identify discharge learning needs (meds, wound care, etc )  - Arrange for interpretive services to assist at discharge as needed  - Refer to Case Management Department for coordinating discharge planning if the patient needs post-hospital services based on physician/advanced practitioner order or complex needs related to functional status, cognitive ability, or social support system  Outcome: Progressing     Problem: POSTPARTUM  Goal: Experiences normal postpartum course  Description  INTERVENTIONS:  - Monitor maternal vital signs  - Assess uterine involution and lochia  Outcome: Progressing  Goal: Appropriate maternal -  bonding  Description  INTERVENTIONS:  - Identify family support  - Assess for appropriate maternal/infant bonding   -Encourage maternal/infant bonding opportunities  - Referral to  or  as needed  Outcome: Progressing  Goal: Establishment of infant feeding pattern  Description  INTERVENTIONS:  - Assess breast/bottle feeding  - Refer to lactation as needed  Outcome: Progressing  Goal: Incision(s), wounds(s) or drain site(s) healing without S/S of infection  Description  INTERVENTIONS  - Assess and document risk factors for skin impairment   - Assess and document dressing, incision, wound bed, drain sites and surrounding tissue  - Consider nutrition services referral as needed  - Oral mucous membranes remain intact  - Provide patient/ family education  Outcome: Progressing

## 2019-08-18 NOTE — PLAN OF CARE
Problem: Knowledge Deficit  Goal: Patient/family/caregiver demonstrates understanding of disease process, treatment plan, medications, and discharge instructions  Description  Complete learning assessment and assess knowledge base  Interventions:  - Provide teaching at level of understanding  - Provide teaching via preferred learning methods  Outcome: Completed  Goal: Verbalizes understanding of labor plan  Description  Assess patient/family/caregiver's baseline knowledge level and ability to understand information  Provide education via patient/family/caregiver's preferred learning method at appropriate level of understanding  1  Provide teaching at level of understanding  2  Provide teaching via preferred learning method(s)  Outcome: Completed     Problem: Labor & Delivery  Goal: Manages discomfort  Description  Assess and monitor for signs and symptoms of discomfort  Assess patient's pain level regularly and per hospital policy  Administer medications as ordered  Support use of nonpharmacological methods to help control pain such as distraction, imagery, relaxation, and application of heat and cold  Collaborate with interdisciplinary team and patient to determine appropriate pain management plan  1  Include patient in decisions related to comfort  2  Offer non-pharmacological pain management interventions  3  Report ineffective pain management to physician  Outcome: Completed  Goal: Patient vital signs are stable  Description  1  Assess vital signs - vaginal delivery    Outcome: Completed

## 2019-08-19 VITALS
HEART RATE: 73 BPM | BODY MASS INDEX: 29.19 KG/M2 | TEMPERATURE: 98.3 F | OXYGEN SATURATION: 100 % | WEIGHT: 203.93 LBS | DIASTOLIC BLOOD PRESSURE: 74 MMHG | SYSTOLIC BLOOD PRESSURE: 115 MMHG | HEIGHT: 70 IN | RESPIRATION RATE: 18 BRPM

## 2019-08-19 LAB — RPR SER QL: NORMAL

## 2019-08-19 PROCEDURE — 99024 POSTOP FOLLOW-UP VISIT: CPT | Performed by: OBSTETRICS & GYNECOLOGY

## 2019-08-19 PROCEDURE — 99254 IP/OBS CNSLTJ NEW/EST MOD 60: CPT | Performed by: PSYCHIATRY & NEUROLOGY

## 2019-08-19 RX ORDER — DOCUSATE SODIUM 100 MG/1
100 CAPSULE, LIQUID FILLED ORAL 2 TIMES DAILY
Qty: 10 CAPSULE | Refills: 0 | Status: SHIPPED | OUTPATIENT
Start: 2019-08-19 | End: 2019-09-09

## 2019-08-19 RX ORDER — IBUPROFEN 600 MG/1
600 TABLET ORAL EVERY 6 HOURS PRN
Qty: 30 TABLET | Refills: 0 | Status: SHIPPED | OUTPATIENT
Start: 2019-08-19 | End: 2019-09-09

## 2019-08-19 RX ORDER — ACETAMINOPHEN 325 MG/1
650 TABLET ORAL EVERY 4 HOURS PRN
Qty: 30 TABLET | Refills: 0 | Status: SHIPPED | OUTPATIENT
Start: 2019-08-19 | End: 2019-09-09

## 2019-08-19 RX ORDER — ESCITALOPRAM OXALATE 10 MG/1
10 TABLET ORAL DAILY
Status: DISCONTINUED | OUTPATIENT
Start: 2019-08-19 | End: 2019-08-19 | Stop reason: HOSPADM

## 2019-08-19 RX ORDER — ESCITALOPRAM OXALATE 10 MG/1
10 TABLET ORAL DAILY
Qty: 30 TABLET | Refills: 0 | Status: SHIPPED | OUTPATIENT
Start: 2019-08-19 | End: 2021-03-24

## 2019-08-19 RX ADMIN — DOCUSATE SODIUM 100 MG: 100 CAPSULE, LIQUID FILLED ORAL at 08:41

## 2019-08-19 RX ADMIN — ENOXAPARIN SODIUM 40 MG: 40 INJECTION SUBCUTANEOUS at 08:40

## 2019-08-19 RX ADMIN — WITCH HAZEL 1 PAD: 500 SOLUTION RECTAL; TOPICAL at 08:41

## 2019-08-19 RX ADMIN — HYDROCORTISONE 1 APPLICATION: 1 CREAM TOPICAL at 08:41

## 2019-08-19 RX ADMIN — IBUPROFEN 600 MG: 600 TABLET ORAL at 08:40

## 2019-08-19 RX ADMIN — BENZOCAINE AND LEVOMENTHOL: 200; 5 SPRAY TOPICAL at 08:40

## 2019-08-19 RX ADMIN — ACETAMINOPHEN 650 MG: 325 TABLET ORAL at 14:23

## 2019-08-19 NOTE — SOCIAL WORK
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, and involved and supportive  CM spoke with PRETTY Clayton regarding possible well baby visit for additional support at home and she advised that with [facility] insurance pt only gets 50 visits per year, so that would count against her total visits which she may need for future care depending on what treatment she requires for the breast cancer  Psych eval done today with recommendation to start Lexapro to help manage symptoms of depression and anxiety  Discussed with PATRICIA Miller who reports pt has been appropriate and cleared for d/c  Pt rec'd info for [facility] support center with discharge instructions and there were no CM needs noted to be addressed prior to d/c home today

## 2019-08-19 NOTE — PLAN OF CARE
Problem: PAIN - ADULT  Goal: Verbalizes/displays adequate comfort level or baseline comfort level  Description  Interventions:  - Encourage patient to monitor pain and request assistance  - Assess pain using appropriate pain scale  - Administer analgesics based on type and severity of pain and evaluate response  - Implement non-pharmacological measures as appropriate and evaluate response  - Consider cultural and social influences on pain and pain management  - Notify physician/advanced practitioner if interventions unsuccessful or patient reports new pain  Outcome: Progressing     Problem: INFECTION - ADULT  Goal: Absence or prevention of progression during hospitalization  Description  INTERVENTIONS:  - Assess and monitor for signs and symptoms of infection  - Monitor lab/diagnostic results  - Monitor all insertion sites, i e  indwelling lines, tubes, and drains  - Monitor endotracheal if appropriate and nasal secretions for changes in amount and color  - [place] appropriate cooling/warming therapies per order  - Administer medications as ordered  - Instruct and encourage patient and family to use good hand hygiene technique  - Identify and instruct in appropriate isolation precautions for identified infection/condition  Outcome: Progressing  Goal: Absence of fever/infection during neutropenic period  Description  INTERVENTIONS:  - Monitor WBC    Outcome: Progressing     Problem: SAFETY ADULT  Goal: Patient will remain free of falls  Description  INTERVENTIONS:  - Assess patient frequently for physical needs  -  Identify cognitive and physical deficits and behaviors that affect risk of falls    -  [place] fall precautions as indicated by assessment   - Educate patient/family on patient safety including physical limitations  - Instruct patient to call for assistance with activity based on assessment  - Modify environment to reduce risk of injury  - Consider OT/PT consult to assist with strengthening/mobility  Outcome: Progressing  Goal: Maintain or return to baseline ADL function  Description  INTERVENTIONS:  -  Assess patient's ability to carry out ADLs; assess patient's baseline for ADL function and identify physical deficits which impact ability to perform ADLs (bathing, care of mouth/teeth, toileting, grooming, dressing, etc )  - Assess/evaluate cause of self-care deficits   - Assess range of motion  - Assess patient's mobility; develop plan if impaired  - Assess patient's need for assistive devices and provide as appropriate  - Encourage maximum independence but intervene and supervise when necessary  - Involve family in performance of ADLs  - Assess for home care needs following discharge   - Consider OT consult to assist with ADL evaluation and planning for discharge  - Provide patient education as appropriate  Outcome: Progressing  Goal: Maintain or return mobility status to optimal level  Description  INTERVENTIONS:  - Assess patient's baseline mobility status (ambulation, transfers, stairs, etc )    - Identify cognitive and physical deficits and behaviors that affect mobility  - Identify mobility aids required to assist with transfers and/or ambulation (gait belt, sit-to-stand, lift, walker, cane, etc )  - [place] fall precautions as indicated by assessment  - Record patient progress and toleration of activity level on Mobility SBAR; progress patient to next Phase/Stage  - Instruct patient to call for assistance with activity based on assessment  - Consider rehabilitation consult to assist with strengthening/weightbearing, etc   Outcome: Progressing     Problem: DISCHARGE PLANNING  Goal: Discharge to home or other facility with appropriate resources  Description  INTERVENTIONS:  - Identify barriers to discharge w/patient and caregiver  - Arrange for needed discharge resources and transportation as appropriate  - Identify discharge learning needs (meds, wound care, etc )  - Arrange for interpretive services to assist at discharge as needed  - Refer to Case Management Department for coordinating discharge planning if the patient needs post-hospital services based on physician/advanced practitioner order or complex needs related to functional status, cognitive ability, or social support system  Outcome: Progressing     Problem: POSTPARTUM  Goal: Experiences normal postpartum course  Description  INTERVENTIONS:  - Monitor maternal vital signs  - Assess uterine involution and lochia  Outcome: Progressing  Goal: Appropriate maternal -  bonding  Description  INTERVENTIONS:  - Identify family support  - Assess for appropriate maternal/infant bonding   -Encourage maternal/infant bonding opportunities  - Referral to  or  as needed  Outcome: Progressing  Goal: Establishment of infant feeding pattern  Description  INTERVENTIONS:  - Assess breast/bottle feeding  - Refer to lactation as needed  Outcome: Progressing  Goal: Incision(s), wounds(s) or drain site(s) healing without S/S of infection  Description  INTERVENTIONS  - Assess and document risk factors for skin impairment   - Assess and document dressing, incision, wound bed, drain sites and surrounding tissue  - Consider nutrition services referral as needed  - Oral mucous membranes remain intact  - Provide patient/ family education  Outcome: Progressing

## 2019-08-19 NOTE — CONSULTS
Consultation - Adwoa  Cammy 6 37 y o  female MRN: [id number]  Unit/Bed#: -01 Encounter: [id number]  08/19/19  1:09 PM    Chief Complaint: anxiety and depression  History of Present Illness   Physician Requesting Consult: Jamie Medina MD  Reason for Consult / Principal Problem: Forceps delivery with baby delivered    Patient is a 37 y o  female presents with anxiety and depression  Primary complaints include: anxiety, feeling depressed and tearfulness  Onset of symptoms was gradual starting several months ago with gradually worsening course since that time  Psychosocial Stressors: family, financial, health, marital, occupational and social   Patient stated she has no past history of depression or anxiety  She reported she started feeling depressed after moving from [place] to [address]  All her family lives in [facility] but she moved her because her  took a job in [place]  She stated it has been difficult to adjust to living in [address] specially in [place] small city, after giving up her job as a   She quickly became pregnant and decided to stay home and raise her daughter  Three months after having her first child she became pregnant with her second child  She has 15 month old daughter and a new born  Her  is very supportive but they have no family nearby   stated his mother came in to help them and will stay with them for while  Patient stated she felt she was able to cope with her stressors but at this point she feels she needs more than good coping mechanism  She is anxious most of the time and has trouble falling and staying asleep, she is feeling fatigued and is often tearful  She stated what made it worse was the recent diagnosis ob breast CA  She has been very anxious since and she worries about not being healthy enough to raise her daughters  Denies SI or HI  She denies A/V hallucinations  Denies manic symptoms   She agrees with starting antidepressant medication to help manage her anxiety and depression  Consults    Psychiatric Review Of Systems:  sleep: yes  appetite changes: no  weight changes: no  energy/anergy: no  interest/pleasure/anhedonia: yes  somatic symptoms: no  anxiety/panic: yes  christopher: no  guilty/hopeless: no  self injurious behavior/risky behavior: no    Historical Information   Past Psychiatric History:   None    Past Suicide attempts: denies  Past Violent behavior: denies  Past Psychiatric medication trial: none        Substance Abuse History:  Denies  History of IP/OP rehabilitation program: denies  Smoking history: denies      Family Psychiatric History:   Family History   Problem Relation Age of Onset    Stomach cancer Mother 37    Diabetes Maternal Grandmother     No Known Problems Father     No Known Problems Sister      Psychiatric Illness Maternal aunt Illness: possible bipolar disorder    Social History  Education: college graduate  Learning Disabilities: none  Marital history:   Living arrangement, social support: The patient lives in home with    is a pain management physician for [employer]  Occupational History: unemployed  Functioning Relationships: good relationship with spouse or significant other    Other Pertinent History: Financial    Traumatic History:   Abuse: denies  Other Traumatic Events: n/a    Past Medical History:   Diagnosis Date    Abnormal Pap smear of cervix     HPV (human papilloma virus) infection     Postpartum depression     As per patient's spouse "it got severe"    Primary breast malignancy, [facility] ([facility] [address] ) 08/14/2019    Rh negative state in antepartum period     Varicella     Vaccinated       Medical Review Of Systems:  Review of Systems    Meds/Allergies   Current Facility-Administered Medications   Medication Dose Route Frequency Provider Last Rate Last Dose    acetaminophen (TYLENOL) tablet 650 mg  650 mg Oral Q4H PRN Aleta CHEUNG Doucette,         benzocaine-menthol-lanolin-aloe (DERMOPLAST) 20-0 5 % topical spray   Topical 4x Daily PRN Stephanye A Francoise, DO        calcium carbonate (TUMS) chewable tablet 1,000 mg  1,000 mg Oral Daily PRN Stephanye A Francoise, DO        docusate sodium (COLACE) capsule 100 mg  100 mg Oral BID Stephanye A Francoise, DO   100 mg at 08/19/19 0841    enoxaparin (LOVENOX) subcutaneous injection 40 mg  40 mg Subcutaneous Q24H [address] Marbella Carey MD   40 mg at 08/19/19 0840    hydrocortisone 1 % cream 1 application  1 application Topical PRN Stephanye A Francoise, DO   1 application at 17/32/39 0841    ibuprofen (MOTRIN) tablet 600 mg  600 mg Oral Q6H PRN Stephanye A Francoise, DO   600 mg at 08/19/19 0840    magnesium hydroxide (MILK OF MAGNESIA) 400 mg/5 mL oral suspension 30 mL  30 mL Oral Daily PRN Stephanye A Francoise, DO        measles-mumps-rubella (M-M-R II) subcutaneous injection 0 5 mL  0 5 mL Subcutaneous Once PRN Stephanye A Francoise, DO        oxyCODONE-acetaminophen (PERCOCET) 5-325 mg per tablet 1 tablet  1 tablet Oral Q4H PRN Stephanye A Francoise, DO        oxyCODONE-acetaminophen (PERCOCET) 5-325 mg per tablet 2 tablet  2 tablet Oral Q4H PRN Stephanye A Francoise, DO   2 tablet at 08/18/19 0502    senna (SENOKOT) tablet 8 6 mg  1 tablet Oral HS PRN Stephanye A Francoise, DO        simethicone (MYLICON) chewable tablet 80 mg  80 mg Oral 4x Daily PRN Stephanye A Francoise, DO        tetanus-diphtheria-acellular pertussis (BOOSTRIX) IM injection 0 5 mL  0 5 mL Intramuscular Once PRN Stephanye A Francoise, DO        witch hazel-glycerin (TUCKS) topical pad 1 pad  1 pad Topical PRN Stephanye A Francoise, DO   1 pad at 08/19/19 0841     Medications Prior to Admission   Medication    Cholecalciferol (VITAMIN D PO)    doxylamine (UNISOM) 25 MG tablet    folic acid (FOLVITE) 1 mg tablet    omeprazole (PriLOSEC) 20 mg delayed release capsule    PRENATAL 27-1 MG TABS    Pyridoxine HCl (VITAMIN B-6) 25 MG tablet    EPINEPHrine (EPIPEN) 0 3 mg/0 3 mL SOAJ     Allergies Allergen Reactions    Bee Venom        Objective   Vital signs in last 24 hours:  Temp:  [98 3 °F (36 8 °C)-99 1 °F (37 3 °C)] 98 3 °F (36 8 °C)  HR:  [73-76] 73  Resp:  [18-20] 18  BP: (115-119)/(65-74) 115/74    No intake or output data in the 24 hours ending 08/19/19 1309    Mental Status Evaluation:     Appearance:  age appropriate and casually dressed   Behavior:  coopeartive and pleasant   Speech:  normal pitch and normal volume   Mood:  anxious and depressed   Affect:  mood-congruent   Language: anomia No and aphasia  No   Thought Process:  goal directed and logical   Thought Content:  normal   Perceptual Disturbances: None   Risk Potential: Suicidal Ideations none, Homicidal Ideations none and Potential for Aggression No   Sensorium:  person, place, time/date and situation   Cognition:  grossly intact   Consciousness:  alert and awake    Attention: attention span and concentration were age appropriate   Intellect: not examined   Fund of Knowledge: awareness of current events: is adequate   Insight:  good   Judgment: good   Muscle Strength and Tone: not examined   Gait/Station: normal gait/station and normal balance   Motor Activity: no abnormal movements     Lab Results:   Admission on 08/17/2019   Component Date Value    ABO Grouping 08/17/2019 B     Rh Factor 08/17/2019 Negative     Antibody Screen 08/17/2019 Positive     Specimen Expiration Date 08/17/2019 [id number]     WBC 08/17/2019 7 30     RBC 08/17/2019 3 73*    Hemoglobin 08/17/2019 11 2*    Hematocrit 08/17/2019 33 8*    MCV 08/17/2019 91     MCH 08/17/2019 30 0     MCHC 08/17/2019 33 1     RDW 08/17/2019 13 5     MPV 08/17/2019 11 0     Platelets 54/19/9261 220     nRBC 08/17/2019 0     Neutrophils Relative 08/17/2019 64     Immat GRANS % 08/17/2019 0     Lymphocytes Relative 08/17/2019 24     Monocytes Relative 08/17/2019 10     Eosinophils Relative 08/17/2019 1     Basophils Relative 08/17/2019 1     Neutrophils Absolute 08/17/2019 4 64     Immature Grans Absolute 08/17/2019 0 03     Lymphocytes Absolute 08/17/2019 1 74     Monocytes Absolute 08/17/2019 0 74     Eosinophils Absolute 08/17/2019 0 10     Basophils Absolute 08/17/2019 0 05     RPR 08/17/2019 Non-Reactive     ANTIBODY ID  #1 08/17/2019 Passive D Antibody, Patient Received RHIG     pH, Cord Art 08/17/2019 7 289     pCO2, Cord Art 08/17/2019 44 8     pO2, Cord Art 08/17/2019 25 1*    HCO3, Cord Art 08/17/2019 21 0     Base Exc, Cord Art 08/17/2019 -5 6*    O2 Content, Cord Art 08/17/2019 12 3     O2 Hgb, Arterial Cord 08/17/2019 57 3     pH, Cord Chaparro 08/17/2019 7 300     pCO2, Cord Chaparro 08/17/2019 42 7     pO2, Cord Chaparro 08/17/2019 28 1     HCO3, Cord Chaparro 08/17/2019 20 5     Base Exc, Cord Chaparro 08/17/2019 -5 7*    O2 Cont, Cord Chaparro 08/17/2019 14 2     O2 HGB,VENOUS CORD 08/17/2019 64 4     ABO Grouping 08/17/2019 B     Rh Factor 08/17/2019 Negative     Antibody Screen 08/17/2019 Positive     Fetal Bleed Screen 08/17/2019 Negative     Rapid HIV 1 AND 2 08/18/2019 Non-Reactive     HIV-1 P24 Ag Screen 08/18/2019 Non-Reactive     Hepatitis B Surface Ag 08/17/2019 Non-reactive     Rubella IgG Quant 08/17/2019 >175 0        )Assessment/Plan     Assessment:  Major Depressive Disorder moderate   Generalized Anxiety Disorder    Plan:   Risks, benefits and possible side effects of Medications:   Risks, benefits, and possible side effects of medications explained to patient and patient verbalizes understanding  Start Lexapro 10 mg po qam  Follow up with PCP or OB/GYn in 2-4 weeks   [facility] is a good resource for counseling and support since she declined our offer to follow up with individual counselor at the outpatient clinic  Code Status: )Level 1 - Full Code  Advance Directive and Living Will:      Power of :    POLST:      Counseling / Coordination of Care  Total floor / unit time spent today n/a minutes   Greater than 50% of total time was spent with the patient and / or family counseling and / or coordination of care   A description of the counseling / coordination of care:

## 2019-08-19 NOTE — DISCHARGE INSTRUCTIONS
Cáncer de seno   LO QUE USTED DEBE SABER:   El cáncer de seno es un tipo de cáncer que comienza en el tejido del seno femenino o masculino  Puede ocasionar un bulto, deformidad del seno o flujo que sale del pezón  Las células del cáncer de seno podrían propagarse a otras partes del cuerpo, daisy al hígado, al riñón y al cerebro  INSTRUCCIONES:   Medicamentos:   · Medicamentos antináuseas: Estos medicamentos pueden ser administrados para calmar kwok estómago y prevenir vómitos  · Medicamentos para el dolor:  Es posible que le receten medicamentos para disminuir el dolor  No espere hasta que el dolor sea intenso antes de pedir mas medicamento  · Elysian annie medicamentos daisy se le haya indicado  Llame a kwok proveedor de razia si piensa que kwok medicamento no le está ayudando o tiene efectos secundarios  Infórmele si es alérgico a algún medicamento  Mantenga mehdi lista de annie medicamentos, vitaminas, y hierbas que está tomando  Incluya la cantidad que yessy, la [place], y por qué las yessy  Traiga la lista o las botellas de las píldoras a annie visitas de seguimiento  Lleve siempre consigo mehdi lista de annie medicamentos en sophia de emergencia  Programe mehdi dinorah con el oncólogo daisy se le indique:  Usted necesitará jadiel al oncólogo para recibir tratamiento continuo y para darle seguimiento  Escriba las preguntas que tenga para que recuerde hacerlas alessia annie visitas  Ingiera líquidos daisy se le indique:  Pregunte cuánto líquido debe ingerir y cuáles son los mejores para usted  Ingiera líquidos adicionales para evitar la deshidratación  También necesitará reemplazar los líquidos si está vomitando o si tiene diarrea debido al tratamiento contra el cáncer  Limite el alcohol:  El oncólogo podría pedirle que limite o que no deny alcohol  Lynn podría aumentar el riesgo de que el cáncer de seno regrese  No fume:  Si fuma, nunca es demasiado tarde para dejar de hacerlo   El fumar podría aumentar el riesgo de que el cáncer de seno regrese  Pregunte por mas información si necesita dejar de fumar  Comuníquese con el oncólogo si:   · Tiene fiebre  · No puede asistir a limon visita de radiación o quimioterapia  · Está vomitando y no puede retener líquidos  · Está deprimido y siente que no puede resistir mas  · Tiene preguntas o inquietudes acerca de limon condición o cuidado  Regrese al departamento de emergencias si:   · El brazo se inflama y [place]  · Limon brazo o pierna se sienten calientes, sensibles, y dolorosos  Se podría jadiel inflamado y leon  · Repentinamente se siente mareado o falto de aliento  · Tiene dolor de pecho cuando respira profundamente o al toser  Usted podría toser Max rojas  © 2014 [address] is for End User's use only and may not be sold, redistributed or otherwise used for commercial purposes  All illustrations and images included in CareNotes® are the copyrighted property of A D A M , Inc  or Alejandro Valencia  Esta información es sólo para uso en educación  Limon intención no es darle un consejo médico sobre enfermedades o tratamientos  Colsulte con limon Shaun Sheridan farmacéutico antes de seguir cualquier régimen médico para saber si es seguro y efectivo para usted  Cuidado personal después del parto   CUIDADO AMBULATORIO:   El periodo postparto  es el periodo de tiempo desde el momento de halley a amanda hasta por lo menos 6 semanas  Alessia mj tiempo usted puede experimentar muchos cambios físicos daisy emocionales  Es muy importante entender que es lo normal y cuándo es necesario llamar a limon médico  También es importante saber daisy cuidarse a sí misma alessia mj periodo  Llame al 911 en sophia de presentar lo siguiente:   · Usted empapa mehdi toalla higiénica en 15 minutos, tiene visión borrosa, piel húmeda o pálida y siente que se va a desmayar  · Usted se desmaya o pierde el conocimiento       · Limon corazón está latiendo más rápido de lo normal      · Usted tiene dificultad para respirar  · Usted expectora tiesha  Busque atención médica de inmediato si:   · Usted empapa 1 o más toallas higiénicas en 1 hora, o de kwok vagina le salen unos coágulos de tiesha más grandes que mehdi moneda de 25 [address]  · Kwok pierna está Kathleen Hosteller y [place] de lo normal      · Usted tiene dolor abdominal intenso  · Usted tiene un noemi dolor de joanne o cambios en kwok visión  · La incisión de la episiotomía o de la cesárea está arnaud, inflamada, sangrando o supurando pus  · La incisión se abre  · Usted ve o escucha cosas que no existen o tiene pensamientos de [place] daño a sí misma o de lastimar a kwok bebé  Comuníquese con kwok obstetra o la partera sí:   · Usted tiene fiebre  · Usted le comienza o se le empeora el dolor en kwok abdomen o vagina  · Usted sigue con tristeza de maternidad 10 shruthi después del parto  · Usted tiene dificultad para dormir  · Usted tiene flujo vaginal con mal olor  · Usted tiene dolor o ardor al orinar  · Usted no tiene evacuaciones intestinales por 3 días o más  · Usted tiene náuseas o está vomitando  · Usted tiene unos bultos duros o alayna delcid por encima de annie senos  · Usted tiene los pezones cuarteados o le están sangrando  · Usted tiene preguntas o inquietudes acerca de kwok condición o cuidado  Cambios físicos:  A continuación están los cambios normales después de halley a amanda:  · Dolor en el área entre el ano y la vagina    · Dolor en el pecho    · Estreñimiento o hemorroides    · Golpes de calor o frío    · Sangrado o secreción vaginal    · Cólicos abdominales de leves a moderados    · Dificultad para controlar las deposiciones o la orina  Cambios emocionales:  Los siguientes son los síntomas de la tristeza de maternidad o de las emociones normales después de halley a amanda  El cambio en annie emociones puede ser causado por un bajón en los niveles hormonales después del [place]  Si estos síntomas shipman más de 1 a 2 [address] de halley a amanda, usted podría tener depresión posparto  · Sentirse irritable    · Sentimiento de tristeza    · Llora sin razón    · Sentimiento de ansiedad  Cuidado de los senos para mamás lactantes:  Usted puede tener los senos adoloridos alessia 3 a 6 días después de halley a amanda  [place] sucede a medida que la [place] a llenar annie senos  Es posible que también tenga los senos adoloridos en sophia que usted no esté dando de lactar a kwok bebé con frecuencia  Diego lo siguiente para cuidar de annie senos:  · Aplique mehdi compresa húmeda y tibia en annie senos según las indicaciones  [place] puede servir para calmar el dolor en annie senos  Asegúrese que el paño no esté muy caliente antes de colocarlo en kwok pecho  · Alimente al bebé o sáquese leche con frecuencia  [place] puede prevenir la congestión de los conductos de [place]  Pregunte a kwok médico con qué frecuencia debe alimentar a kwok bebé o sacarse leche  · Dese masajes en los senos según las indicaciones  [place] puede ayudar a aumentar el flujo de [place]  [place]rote con suavidad de forma circular los senos antes de la lactancia  Es posible que necesite apretar con suavidad kwok pecho o pezón para ayudar a que salga la leche  Usted también puede usar un extractor de [place] para ayudar a liberar la leche de annie senos  · Lávese los senos sólo con agua tibia  No use jabón en annie pezones  El jabón puede [employer]  · Aplique crema de lanolina en annie pezones según las indicaciones  La crema de lanonila puede servir para humectar kwok piel y evitar que los pezones se resequen  Siempre  debe quitarse la crema de lanolina con agua tibia antes de darle pecho a kwok bebé  · Use protectores para la lactancia en kwok brasier o sostén  La [employer] salir de annie pezones cuando usted no está dando de lactar  Usted se puede colocar los protectores dentro de kwok brasier para evitar que la [place] se traspase a kwok ropa   Pregunte a kwok médico sobre donde puede conseguir los protectores para lactancia  · Solicite asistencia para la lactancia materna si lo necesita  Existen médicos que le pueden contestar las preguntas sobre la Harry Avers y brindar [de-identified]  Pregunte a kwok médico con quién puede asesorarse si necesita asistencia con la lactancia  El cuidado de los senos para las madres que dan biberón con formula:  La leche materna llenará annie pechos incluso si usted alimenta a kwok bebé con leche de formula  A continuación unas cosas que puede hacer que ayudan a que deje de producir [place] y que annie senos se llenen y le provoque dolor:  · Use un sostén o brasier de soporte en todo momento  Un brasier deportivo o un sostén Cora Pradeep puede ayudar a suspender la producción de [place]  · Aplique hielo en cada seno por 15 a 20 minutos cada hora según las indicaciones  Use un paquete de hielo o ponga hielo molido dentro de [employer]  Cúbrala con mehdi toalla  El hielo ayuda a encoger los conductos de [place]  · Evite que le caiga agua tibia en annie senos  El agua tibia hará que sea más fácil que la leche llene annie pechos  El la ducha póngase de espaldas al agua  · Limite la cantidad de tiempo que toca annie senos  [place] evitará que los senos se llenen de [place]  Cuidado del perineo:  El perineo es el área entre el recto y la vagina  Es normal tener inflamación y dolor en esta área después de halley a amanda  Si a usted le hicieron mehdi episiotomía, kwok médico le proporcionará instrucciones especiales  · Limpie el perineo después de ir al baño  [place] puede prevenir mehdi infección y [de-identified] para la cicatrización  Use mehdi botella de agua tibia con atomizador para limpiar el perineo  También puede rociar suavemente agua tibia contra el perineo mientras orina  Siempre debe limpiarse de adelante hacia atrás  · [facility]  Un baño de asiento puede servir para aliviar la inflamación y el dolor   Llene la stephen o un recipiente con agua hasta que [place] annie caderas y siéntese en el agua  Use agua fría por 2 días después del parto  Luego use agua tibia  Pregunte a kwok médico por más [employer] mauricio de [place]  · Aplique compresas de hielo por las primeras 24 horas o [address] indicaciones  Use un guante médico y llénelo con hielo o compre compresas de hielo  Envuelva la compresa de hielo o el guante en mehdi toalla o paño pequeño  Coloque la compresa de hielo en el perineo por 20 minutos cada vez  · Siéntese en un cojín en forma de allyn  [place] puede ayudar [employer] presión que se ejerce en kwok perineo cuando se sienta  · Use toallitas medicadas o tome pastillas según las indicaciones  Kwok médico puede indicarle que use toallitas de hamamelis  Usted puede colocar las toallitas de hamamelis en el refrigerados antes de aplicarlas en el perineo  También le puede indicar que tome un analgésico antiinflamatorio  Pregunte a kwok médico con que frecuencia usar las pastillas o usar las toallitas con medicamento  · No vaya a nadar ni tome mauricio en stephen por 4 a 6 semanas o según las indicaciones  [place] servirá para evitar mehdi infección en kwok útero o vagina   El cuidado intestinal y de la vejiga:  Deon Pea puede francine entre 3 a 5 días para tener mehdi evacuación intestinal después de halley a amanda a kwok bebé  Usted puede hacer lo siguiente para prevenir o controlar el estreñimiento y tener el control de annie intestinos o vejiga:  · [facility] indicaciones  Un ablandador fecal es un medicamento que hará que annie movimiento intestinal de materia fecal sea [address]  [place] puede prevenir o aliviar el estreñimiento  Un ablandador fecal además puede hacer que la evacuación intestinal duela menos  · [place] suficiente líquidos  Pregunte cuánto líquido debe francine cada día y cuáles líquidos son los más adecuados para usted  Los líquidos pueden ayudar a prevenir el estreñimiento  · [place] alimentos ricos en fibras  [address] frutas, verduras, granos, frijoles y lentejas  Pregunte a kwok médico cuál es la cantidad de fibra que necesita al día  La fibra puede prevenir el estreñimiento  · Diego los ejercicios de [place] según las indicaciones  Los ejercicios de [place] sirven para fortalecer los músculos que [employer] movimientos intestinales y la [place]  [place] a kwok médico más [employer] ejercicios de [place]  · Aplique mehdi compresa o medicamento para la hemorroides según las indicaciones  [place] puede aliviar la inflamación y el dolor  Kwok médico le puede indicar que use hielo o pañitos medicados para la hemorroides  También le puede indicar que se diego mauricio tibios de [place]  Pregunte a kwok médico por mayor información sobre cómo controlar la hemorroides  Nutrición:  Lindsay Rian buena nutrición es importante en el periodo posparto  La nutrición ayuda a que usted regrese a kwok peso saludable, aumenta el nivel de energía y mello el estreñimiento  También sirve para que [employer] suficientes nutrientes y calorías si usted va a alimentar a kwok mick con [place]  · Consuma alimentos saludables y variados  Los alimentos saludables incluyen frutas, verduras, pan integral, productos lácteos bajos en grasa, frijoles, dayanna magras y pescado  Usted puede [employer] 500 a 700 calorías adicionales al día si usted está dando de lactar a kwok bebé  Puede que también necesite añadir proteína  · Limite los alimentos con azúcar añadida y grandes cantidades de [place]  [place] alimentos son altos en calorías y bajos en nutrientes saludables  [place] las etiquetas de los alimentos para que sepa cuál es la cantidad de azúcar y grasas que contiene los alimentos que quiere comer  · [employer] 8 a 10 vasos de agua al día  El agua le ayudará a producir suficiente leche para kwok bebé  También le ayudará a prevenir el estreñimiento  [place] un vaso de agua cada vez que amamanta a kwok bebé       · [facility] indicaciones  Pregunte a kwok médico cuáles vitaminas necesita  · Limite la cafeína y el alcohol si usted está alimentando a kwok bebé con [employer]  A[place]a [place] y el alcohol pueden pasar a la [employer]  [place] y el alcohol pueden hacer que kwok bebé esté molesto  Calcium también interfiere con el sueño de kwok bebé  Pregunte a kwok médico si usted puede francine alcohol o cafeína  Descanse y duerma:  Usted se puede sentir muy cansada en el periodo posparto  Dormir lo suficiente le ayudará a recuperarse y tener la energía para cuidar de kwok bebé  Los siguientes pueden ayudarle a dormir y descansar:  · Duerma cuando kwok bebé esté tomando la siesta  Kwok bebé puede francine varias siestas alessia el día  Descanse alessia Alec  · [address]  Muchas personas quieren venir a visitarla a usted y conocer al bebé  [place] a annie amigos y familiares que la visiten en diferentes días  Calcium le dará tiempo para descansar  · No planee demasiadas cosas en un día  Deje los quehaceres de la casa para que tenga tiempo para descansar  Poco a poca pierre más cada día  · Solicite ayuda de familiares, amigos y vecinos  Pida que le ayuden a lauryn la ropa, a limpiar o hacer mandados  También pregunte que alguien le cuide kwok bebé mientras yessy mehdi siesta o se relaja  [place] a kwok nam que la ayude con el cuidado del bebé  Sáquese leche para que kwok nam pueda alimentar a kwok bebé por la noche  Ejercicios después del parto:  Espere hasta que kwok médico la autorice a hacer ejercicio  El ejercicio puede ayudarla adelgazar, aumentar kwok niveles de energía y controlar kwok estado de ánimo  También puede prevenir el estreñimiento y los coágulos  Emp[place] con un ejercicio leve daisy caminar  iRewind a medida que tenga [place]  Es posible que necesite evitar hacer ejercicios para el abdomen por 1 a 2 semana después del parto  Hable con kwok médico sobre un plan de ejercicios que sea adecuado para usted     Actividad sexual después del parto:   · No tenga relaciones sexuales hasta que limon médico lo autorice  Es posible que necesite esperar de 4 a 6 semanas antes de [employer] relación sexual  [place] puede evitar que contraiga mehdi infección y darle tiempo para recuperarse  · Limon ciclo menstrual puede comenzar tan pronto daisy en 3 semanas después de halley a amanda  Limon período puede demorarse si usted está dando de lactar a limon bebé  Usted puede quedar embarazada antes de que le venga limon primer período posparto  Consulte con limon médico sobre los anticonceptivos que son los adecuados para usted  Algunos tipos de anticonceptivos no son [place]  Para [de-identified] y más información:  Participe en un car de apoyo para madres primerizas  [place] ayuda a familiares y [facility] con los [address] de la casa, [place] y el cuidado de limon bebé  · Office of Women's Health,  Department of Health and Human Services  [address], [address] [place]  [place] , [address]  [address], [address] [place]  [place] , [address]  Phone: [phone]  Web Address: www womenshealth gov  · [facility] Postpartum [address] [facility] , [address] [place] [address]  [address]  Web Address: [employer] [email or web address]  Programe mehdi dinorah de control con limon obstetra o partera según las indicaciones:  Usted tendrá que acudir [place] 2 a 6 semanas a mehdi dinorah de control con limon médico después del parto  Escriba las preguntas que tenga para que recuerde hacerlas alessia annie citas  © 2017 [address] Information is for End User's use only and may not be sold, redistributed or otherwise used for commercial purposes  All illustrations and images included in CareNotes® are the copyrighted property of A D A M , Inc  or Alejandro Valencia  Esta información es sólo para uso en educación   Limon intención no es darle un consejo [employer] o tratamientos  Colsulte con kwok [place] farmacéutico antes de seguir cualquier régimen médico para saber si es seguro y efectivo para usted

## 2019-08-19 NOTE — PLAN OF CARE
Problem: PAIN - ADULT  Goal: Verbalizes/displays adequate comfort level or baseline comfort level  Description  Interventions:  - Encourage patient to monitor pain and request assistance  - Assess pain using appropriate pain scale  - Administer analgesics based on type and severity of pain and evaluate response  - Implement non-pharmacological measures as appropriate and evaluate response  - Consider cultural and social influences on pain and pain management  - Notify physician/advanced practitioner if interventions unsuccessful or patient reports new pain  8/19/2019 0137 by Daria Castorena RN  Outcome: Progressing  8/19/2019 0116 by Daria Castorena RN  Outcome: Progressing     Problem: INFECTION - ADULT  Goal: Absence or prevention of progression during hospitalization  Description  INTERVENTIONS:  - Assess and monitor for signs and symptoms of infection  - Monitor lab/diagnostic results  - Monitor all insertion sites, i e  indwelling lines, tubes, and drains  - Monitor endotracheal if appropriate and nasal secretions for changes in amount and color  - [place] appropriate cooling/warming therapies per order  - Administer medications as ordered  - Instruct and encourage patient and family to use good hand hygiene technique  - Identify and instruct in appropriate isolation precautions for identified infection/condition  8/19/2019 0137 by Daria Castorena RN  Outcome: Progressing  8/19/2019 0116 by Daria Castorena RN  Outcome: Progressing  Goal: Absence of fever/infection during neutropenic period  Description  INTERVENTIONS:  - Monitor WBC    8/19/2019 0137 by Daria Castorena RN  Outcome: Progressing  8/19/2019 0116 by Daria Castorena RN  Outcome: Progressing     Problem: SAFETY ADULT  Goal: Patient will remain free of falls  Description  INTERVENTIONS:  - Assess patient frequently for physical needs  -  Identify cognitive and physical deficits and behaviors that affect risk of falls    -  [place] fall precautions as indicated by assessment   - Educate patient/family on patient safety including physical limitations  - Instruct patient to call for assistance with activity based on assessment  - Modify environment to reduce risk of injury  - Consider OT/PT consult to assist with strengthening/mobility  8/19/2019 0137 by Penny Escobar RN  Outcome: Progressing  8/19/2019 0116 by Penny Escobar RN  Outcome: Progressing  Goal: Maintain or return to baseline ADL function  Description  INTERVENTIONS:  -  Assess patient's ability to carry out ADLs; assess patient's baseline for ADL function and identify physical deficits which impact ability to perform ADLs (bathing, care of mouth/teeth, toileting, grooming, dressing, etc )  - Assess/evaluate cause of self-care deficits   - Assess range of motion  - Assess patient's mobility; develop plan if impaired  - Assess patient's need for assistive devices and provide as appropriate  - Encourage maximum independence but intervene and supervise when necessary  - Involve family in performance of ADLs  - Assess for home care needs following discharge   - Consider OT consult to assist with ADL evaluation and planning for discharge  - Provide patient education as appropriate  8/19/2019 0137 by Penny Escobar RN  Outcome: Progressing  8/19/2019 0116 by Penny Escobar RN  Outcome: Progressing  Goal: Maintain or return mobility status to optimal level  Description  INTERVENTIONS:  - Assess patient's baseline mobility status (ambulation, transfers, stairs, etc )    - Identify cognitive and physical deficits and behaviors that affect mobility  - Identify mobility aids required to assist with transfers and/or ambulation (gait belt, sit-to-stand, lift, walker, cane, etc )  - [place] fall precautions as indicated by assessment  - Record patient progress and toleration of activity level on Mobility SBAR; progress patient to next Phase/Stage  - Instruct patient to call for assistance with activity based on assessment  - Consider rehabilitation consult to assist with strengthening/weightbearing, etc   2019 by Avni Bonner RN  Outcome: Progressing  2019 011 by Avni Bonner RN  Outcome: Progressing     Problem: DISCHARGE PLANNING  Goal: Discharge to home or other facility with appropriate resources  Description  INTERVENTIONS:  - Identify barriers to discharge w/patient and caregiver  - Arrange for needed discharge resources and transportation as appropriate  - Identify discharge learning needs (meds, wound care, etc )  - Arrange for interpretive services to assist at discharge as needed  - Refer to Case Management Department for coordinating discharge planning if the patient needs post-hospital services based on physician/advanced practitioner order or complex needs related to functional status, cognitive ability, or social support system  2019 013 by Avni Bonner RN  Outcome: Progressing  2019 by Avni Bonner RN  Outcome: Progressing     Problem: POSTPARTUM  Goal: Experiences normal postpartum course  Description  INTERVENTIONS:  - Monitor maternal vital signs  - Assess uterine involution and lochia  2019 013 by Avni Bonner RN  Outcome: Progressing  2019 011 by Avni Bonner RN  Outcome: Progressing  Goal: Appropriate maternal -  bonding  Description  INTERVENTIONS:  - Identify family support  - Assess for appropriate maternal/infant bonding   -Encourage maternal/infant bonding opportunities  - Referral to  or  as needed  [phone] by Avni Bonner RN  Outcome: Progressing  2019 011 by Avni Bonner RN  Outcome: Progressing  Goal: Establishment of infant feeding pattern  Description  INTERVENTIONS:  - Assess breast/bottle feeding  - Refer to lactation as needed  8/19/2019 0137 by Hadley Rosado RN  Outcome: Progressing  8/19/2019 0116 by Hadley Rosado RN  Outcome: Progressing  Goal: Incision(s), wounds(s) or drain site(s) healing without S/S of infection  Description  INTERVENTIONS  - Assess and document risk factors for skin impairment   - Assess and document dressing, incision, wound bed, drain sites and surrounding tissue  - Consider nutrition services referral as needed  - Oral mucous membranes remain intact  - Provide patient/ family education  8/19/2019 0137 by Hadley Rosado RN  Outcome: Progressing  8/19/2019 0116 by Hadley Rosado RN  Outcome: Progressing

## 2019-08-19 NOTE — PROGRESS NOTES
Progress Note - OB/GYN   Isabella Llanos 37 y o  female MRN: [id number]  Unit/Bed#: -01 Encounter: [id number]    Assessment:  37 y o  [id number] s/p Forceps Vaginal Delivery Postpartum day  2    Plan:  1  Routine post-partum care  2  Encourage ambulation  3  Pain control as needed  4  Advance diet as tolerated  5  Received Rhogam  6  Malignant neoplasm of left breast, lumpectomy scheduled 2 weeks postpartum   - Psych consult today  6  Anticipate discharge 8/19/19    Subjective/Objective   Chief Complaint:       Subjective:  Isabella Llanos is well appearing and feels ready to be discharged home today  She does state that she continues to experience the intermittent numbness in her hands she has had since her third trimester  She also reports that the swelling in her hands and legs has not improved  The swelling is in both legs and not painful  She denies any dizziness, nausea, vomiting, chest pain, shortness of breath, palpitations, or headaches  Pain: Well controlled with pain medication regimen  Tolerating PO: yes  Voiding: yes  Flatus: yes  BM: no  Ambulating: yes  Chest pain: no  Shortness of breath: no  Leg pain: no  Lochia: Decreasing    Objective:     Vitals: Blood pressure 119/71, pulse 76, temperature 98 5 °F (36 9 °C), temperature source Axillary, resp  rate 20, height 5' 10" (1 778 m), weight 92 5 kg (203 lb 14 8 oz), last menstrual period 11/16/2018, SpO2 100 %, not currently breastfeeding  No intake or output data in the 24 hours ending 08/19/19 7044    Physical Exam:     General: NAD  Cardiovascular: RRR, no murmur, nl S1/S2   Lungs: CTAB, non-labored breathing   Abdomen: Soft, no distension/rebound/guarding/tenderness   Fundus: Firm, non-tender, fundus: -2 cm below the umbilicus   Lower Extremities: Non-tender      Lab, Imaging and other studies:     Recent Results (from the past 72 hour(s))   Antibody identification    Collection Time: 08/17/19  9:14 AM   Result Value Ref Range    ANTIBODY ID   #1 Passive D Antibody, Patient Received RHIG    Type and screen    Collection Time: 08/17/19  9:21 AM   Result Value Ref Range    ABO Grouping B     Rh Factor Negative     Antibody Screen Positive     Specimen Expiration Date [id number]    CBC and differential    Collection Time: 08/17/19  9:21 AM   Result Value Ref Range    WBC 7 30 4 31 - 10 16 Thousand/uL    RBC 3 73 (L) 3 81 - 5 12 Million/uL    Hemoglobin 11 2 (L) 11 5 - 15 4 g/dL    Hematocrit 33 8 (L) 34 8 - 46 1 %    MCV 91 82 - 98 fL    MCH 30 0 26 8 - 34 3 pg    MCHC 33 1 31 4 - 37 4 g/dL    RDW 13 5 11 6 - 15 1 %    MPV 11 0 8 9 - 12 7 fL    Platelets 586 751 - 488 Thousands/uL    nRBC 0 /100 WBCs    Neutrophils Relative 64 43 - 75 %    Immat GRANS % 0 0 - 2 %    Lymphocytes Relative 24 14 - 44 %    Monocytes Relative 10 4 - 12 %    Eosinophils Relative 1 0 - 6 %    Basophils Relative 1 0 - 1 %    Neutrophils Absolute 4 64 1 85 - 7 62 Thousands/µL    Immature Grans Absolute 0 03 0 00 - 0 20 Thousand/uL    Lymphocytes Absolute 1 74 0 60 - 4 47 Thousands/µL    Monocytes Absolute 0 74 0 17 - 1 22 Thousand/µL    Eosinophils Absolute 0 10 0 00 - 0 61 Thousand/µL    Basophils Absolute 0 05 0 00 - 0 10 Thousands/µL   Blood gas, venous, cord    Collection Time: 08/17/19  5:00 PM   Result Value Ref Range    pH, Cord Chaparro 7 300 7 190 - 7 490    pCO2, Cord Chaparro 42 7 27 0 - 43 0 mm HG    pO2, Cord Chaparro 28 1 15 0 - 45 0 mm HG    HCO3, Cord Chaparro 20 5 12 2 - 28 6 mmol/L    Base Exc, Cord Chaparro -5 7 (L) 1 0 - 9 0 mmol/L    O2 Cont, Cord Chaparro 14 2 mL/dL    O2 HGB,VENOUS CORD 64 4 %   Blood gas, arterial, cord    Collection Time: 08/17/19  5:01 PM   Result Value Ref Range    pH, Cord Art 7 289 7 230 - 7 430    pCO2, Cord Art 44 8 30 0 - 60 0    pO2, Cord Art 25 1 (H) 5 0 - 25 0 mm HG    HCO3, Cord Art 21 0 17 3 - 27 3 mmol/L    Base Exc, Cord Art -5 6 (L) 3 0 - 11 0 mmol/L    O2 Content, Cord Art 12 3 ml/dl    O2 Hgb, Arterial Cord 57 3 %   Postpartum Rhogam    Collection Time: 08/17/19  9:55 PM   Result Value Ref Range    ABO Grouping B     Rh Factor Negative     Antibody Screen Positive     Fetal Bleed Screen Negative    Hepatitis B surface antigen    Collection Time: 08/17/19  9:55 PM   Result Value Ref Range    Hepatitis B Surface Ag Non-reactive Non-reactive, NonReactive - Confirmed   Rubella antibody, IgG    Collection Time: 08/17/19  9:55 PM   Result Value Ref Range    Rubella IgG Quant >175 0 >9 9 IU/mL   Rapid HIV 1/2 AB-AG Combo    Collection Time: 08/18/19 12:58 AM   Result Value Ref Range    Rapid HIV 1 AND 2 Non-Reactive Non-Reactive    HIV-1 P24 Ag Screen Non-Reactive Non-Reactive     Meds:    docusate sodium 100 mg Oral BID   enoxaparin 40 mg Subcutaneous Q24H KOBY       acetaminophen 650 mg Q4H PRN   benzocaine-menthol-lanolin-aloe  4x Daily PRN   calcium carbonate 1,000 mg Daily PRN   hydrocortisone 1 application PRN   ibuprofen 600 mg Q6H PRN   magnesium hydroxide 30 mL Daily PRN   measles-mumps-rubella 0 5 mL Once PRN   oxyCODONE-acetaminophen 1 tablet Q4H PRN   oxyCODONE-acetaminophen 2 tablet Q4H PRN   senna 1 tablet HS PRN   simethicone 80 mg 4x Daily PRN   tetanus-diphtheria-acellular pertussis 0 5 mL Once PRN   witch hazel-glycerin 1 pad PRN             Signature / Title: Kvng Kelley MD, Ob/Gyn, PGY-1  Date: 8/19/2019  Time: 6:28 AM

## 2019-08-19 NOTE — UTILIZATION REVIEW
Notification of Maternity Inpatient Admission/Maternity Inpatient Authorization Request  This is a Notification of Maternity Inpatient Admission/Maternity Inpatient Authorization Request to our facility [address] [place] [address]  Please be advised that this patient is currently in our facility under Inpatient Status  Below you will find the Birth/[place] Summary, Attending Physician and Facilitys information including NPI#  and contact information for the Utilization Review Department where the patient is receiving care services  Facility: [address] [place] [address]  Address: [address] [place] [address]  Phone: [phone] Tax ID: [id number]  NPI: [id number]  MEDICARE ID: [id number]    Place of Service Code: 24   Place of Service Name: Inpatient Hospital  Presentation Date & Time: 2019  7:22 AM  Inpatient Admission Date & Time: 19 6826  Discharge Date & Time: 2019  3:19 PM   Discharge Disposition (if discharged): Home/Self Care  Attending Physician & NPI: Ang Genao, Jossy Blanc [[id number]]  Attending Physician:  TIN Ayoub  Specialty- Obstetrics and Gynecology  [facility] ID- [id number]  [address]   [facility] [address] [facility]  Phone 1: [phone]  Fax: [phone]  Mother of  Information: Grover Rivera   MRN: [id number] YOB: 1980   Mother's Admitting Diagnosis: Encounter for full-term uncomplicated delivery [R54]  Estimated Date of Delivery: 19  Type of Delivery: Vaginal, Forceps    Delivering clinician: Ang Genao   OB History        3    Para   3    Term   2       1    AB   0    Living   2       SAB   0    TAB   0    Ectopic   0    Multiple   0    Live Births   2           Obstetric Comments   Age of menarche 13  Age at first live birth 34           [place] Name & MRN:   Information for the patient's :  Sumaya [place] Girl Anya Padilla) [[id number]]      Delivery Information:  Sex: female  Delivered 2019 4:53 PM by Vaginal, Forceps; Gestational Age: 36w3d     Measurements:  Weight: 7 lb 1 oz (3204 g); Height: 18"    APGAR 1 minute 5 minutes 10 minutes   Totals: 8 9      Thank you,  [address]  Utilization Review Department  Phone: [phone]; Fax [phone]  ATTENTION: Please call with any questions or concerns to [phone]  and carefully follow the prompts so that you are directed to the right person  Send all requests for admission clinical reviews, approved or denied determinations and any other requests to fax [phone]   All voicemails are confidential

## 2019-08-20 LAB — HIV 1+2 AB+HIV1 P24 AG SERPL QL IA: NORMAL

## 2019-08-23 LAB — MISCELLANEOUS LAB TEST RESULT: NORMAL

## 2019-08-26 ENCOUNTER — OFFICE VISIT (OUTPATIENT)
Dept: SURGICAL ONCOLOGY | Facility: CLINIC | Age: 39
End: 2019-08-26
Payer: COMMERCIAL

## 2019-08-26 ENCOUNTER — DOCUMENTATION (OUTPATIENT)
Dept: RADIATION ONCOLOGY | Facility: HOSPITAL | Age: 39
End: 2019-08-26

## 2019-08-26 ENCOUNTER — TELEPHONE (OUTPATIENT)
Dept: SURGICAL ONCOLOGY | Facility: CLINIC | Age: 39
End: 2019-08-26

## 2019-08-26 VITALS
TEMPERATURE: 97.8 F | RESPIRATION RATE: 18 BRPM | WEIGHT: 182 LBS | DIASTOLIC BLOOD PRESSURE: 80 MMHG | BODY MASS INDEX: 26.05 KG/M2 | SYSTOLIC BLOOD PRESSURE: 102 MMHG | HEIGHT: 70 IN | HEART RATE: 68 BPM

## 2019-08-26 DIAGNOSIS — Z01.818 PREOP EXAMINATION: ICD-10-CM

## 2019-08-26 DIAGNOSIS — C50.412 MALIGNANT NEOPLASM OF UPPER-OUTER QUADRANT OF LEFT BREAST IN FEMALE, ESTROGEN RECEPTOR POSITIVE (HCC): Primary | ICD-10-CM

## 2019-08-26 DIAGNOSIS — Z17.0 MALIGNANT NEOPLASM OF UPPER-OUTER QUADRANT OF LEFT BREAST IN FEMALE, ESTROGEN RECEPTOR POSITIVE (HCC): Primary | ICD-10-CM

## 2019-08-26 DIAGNOSIS — Z13.71 BRCA GENE MUTATION NEGATIVE: ICD-10-CM

## 2019-08-26 PROCEDURE — 99214 OFFICE O/P EST MOD 30 MIN: CPT | Performed by: SURGERY

## 2019-08-26 RX ORDER — OXYCODONE HYDROCHLORIDE AND ACETAMINOPHEN 5; 325 MG/1; MG/1
1 TABLET ORAL EVERY 6 HOURS PRN
Qty: 6 TABLET | Refills: 0 | Status: SHIPPED | OUTPATIENT
Start: 2019-08-26 | End: 2021-03-24

## 2019-08-26 NOTE — TELEPHONE ENCOUNTER
Called patient to review genetic testing results  There was no answer; message left with call back number provided  Patient returned phone call  Explained that genetic testing came back negative for any mutations  Patient verbalized understanding and relief  Pre-Op appointment made for patient to see Dr Justine Wells today at 1:00  Patient verbalized understanding of appointment details and denies any additional questions at this time

## 2019-08-26 NOTE — PROGRESS NOTES
Received notice from the [facility] [place] [facility] [place] re the pt and her desire to meet a cancer counselor  D/W Elfego Sweeney, Dr Ureña Males nurse  Pt is to be seen shortly for teaching and Dr Stella Juarez is consenting her  She has a new dx of breast cancer and has very recently given birth to her second daughter  Met with the pt briefly, explained my role and provided her with my contact information  Pt has another appt shortly so our visit was very brief  Will stand by to be of assistance to her as needed

## 2019-08-26 NOTE — H&P (VIEW-ONLY)
Surgical Oncology Follow Up       [address] [facility]  CANCER CARE [facility] SURGICAL ONCOLOGY [place]  [address]  [facility] [address]    Donald Goring  1980  [id number]  [address] [facility] [facility]  CANCER [facility] SURGICAL ONCOLOGY [place]  [address]  [address]    Chief Complaint   Patient presents with    Pre-op Exam          Assessment & Plan:     The patient presents for a follow-up visit  Her automated breast ultrasound followed by directed ultrasound demonstrated that this is most likely unifocal lesion  I have discussed this with Dr Mayur Paniagua as well  He has recommended a follow-up MRI and possibly 6 months to ensure no other abnormalities but not at this time  Consequently we have recommended that we proceed with breast conservation therapy  The patient's genetic testing was negative  We went through the process of informed consent for left breast needle localization lumpectomy in conjunction with a sentinel lymph node biopsy using blue and radioactive dye followed by an possible axillary dissection  Patient understands she would need additional adjuvant therapies including radiation therapy anti hormonal therapy and possibly chemotherapy  We reviewed the complications as outlined on the consent form  All questions were answered to the patient's satisfaction  We will coordinate the surgery in approximately 3 weeks  Cancer History:        Malignant neoplasm of upper-outer quadrant of left breast in female, estrogen receptor positive ([facility] [address] )    8/13/2019 Biopsy     Left breast ultrasound-guided biopsy  A  2 o'clock, 14 cm from nipple  Invasive mammary carcinoma of no special type (ductal, not otherwise specified)  Grade 2  ER 90  WA 60  HER2 1+    B   Left axillary lymph node  Portion of lymph node, negative for carcinoma      8/16/2019 Genetic Testing     The following genes were evaluated: SUDHA, BRCA1, BRCA2, CDH1, CHEK2, PALB2, PTEN, STK11, TP53  Negative result  No pathogenic sequence variants or deletions/dupllications identified  [facility]           Interval History:   See Assessment & Plan    Review of Systems:   Review of Systems   All other systems reviewed and are negative        Past Medical History     Patient Active Problem List   Diagnosis    AMA (advanced maternal age) multigravida 27+    Previous  labor affecting pregnancy, antepartum    44 weeks gestation of pregnancy    Positive GBS test    Malignant neoplasm of upper-outer quadrant of left breast in female, estrogen receptor positive ([facility] [address] )    Forceps delivery with baby delivered     Past Medical History:   Diagnosis Date    Abnormal Pap smear of cervix     BRCA gene mutation negative 2019    [facility]    HPV (human papilloma virus) infection     Postpartum depression     As per patient's spouse "it got severe"    [facility]    Rh negative state in antepartum period     Varicella     Vaccinated     Past Surgical History:   Procedure Laterality Date    COLPOSCOPY      US GUIDED BREAST BIOPSY LEFT COMPLETE Left 2019    US GUIDED BREAST LYMPH NODE BIOPSY LEFT Left 2019     Family History   Problem Relation Age of Onset    Stomach cancer Mother 37    Diabetes Maternal Grandmother     No Known Problems Father     No Known Problems Sister      Social History     Socioeconomic History    Marital status: /Civil Union     Spouse name: Not on file    Number of children: Not on file    Years of education: Not on file    Highest education level: Not on file   Occupational History    Not on file   Social Needs    Financial resource strain: Not on file    Food insecurity:     Worry: Not on file     Inability: Not on file    Transportation needs:     Medical: Not on file     Non-medical: Not on file   Tobacco Use    Smoking status: Never Smoker    Smokeless tobacco: Never Used   Substance and Sexual Activity    Alcohol use: No    Drug use: No    Sexual activity: Not on file   Lifestyle    Physical activity:     Days per week: Not on file     Minutes per session: Not on file    Stress: Not on file   Relationships    Social connections:     Talks on phone: Not on file     Gets together: Not on file     Attends Restorationist service: Not on file     Active member of club or organization: Not on file     Attends meetings of clubs or organizations: Not on file     Relationship status: Not on file    Intimate partner violence:     Fear of current or ex partner: Not on file     Emotionally abused: Not on file     Physically abused: Not on file     Forced sexual activity: Not on file   Other Topics Concern    Not on file   Social History Narrative    Not on file       Current Outpatient Medications:     acetaminophen (TYLENOL) 325 mg tablet, Take 2 tablets (650 mg total) by mouth every 4 (four) hours as needed for mild pain, Disp: 30 tablet, Rfl: 0    Cholecalciferol (VITAMIN D PO), Take by mouth, Disp: , Rfl:     docusate sodium (COLACE) 100 mg capsule, Take 1 capsule (100 mg total) by mouth 2 (two) times a day, Disp: 10 capsule, Rfl: 0    doxylamine (UNISOM) 25 MG tablet, Take 1 tablet by mouth, Disp: , Rfl:     escitalopram (LEXAPRO) 10 mg tablet, Take 1 tablet (10 mg total) by mouth daily, Disp: 30 tablet, Rfl: 0    ibuprofen (MOTRIN) 600 mg tablet, Take 1 tablet (600 mg total) by mouth every 6 (six) hours as needed for moderate pain, Disp: 30 tablet, Rfl: 0    omeprazole (PriLOSEC) 20 mg delayed release capsule, Take 20 mg by mouth daily, Disp: , Rfl:     Pyridoxine HCl (VITAMIN B-6) 25 MG tablet, Take by mouth, Disp: , Rfl:     EPINEPHrine (EPIPEN) 0 3 mg/0 3 mL SOAJ, Inject 0 3 mL (0 3 mg total) into a muscle once for 1 dose, Disp: 0 6 mL, Rfl: 0    PRENATAL 27-1 MG TABS, Take by mouth, Disp: , Rfl:   Allergies   Allergen Reactions    Bee Venom        Physical Exam:     Vitals:    08/26/19 1309   BP: 102/80   Pulse: 68 Resp: 18   Temp: 97 8 °F (36 6 °C)     Physical Exam   Constitutional: She is oriented to person, place, and time  She appears well-developed and well-nourished  HENT:   Head: Normocephalic and atraumatic  Mouth/Throat: Oropharynx is clear and moist    Eyes: Pupils are equal, round, and reactive to light  EOM are normal    Neck: Normal range of motion  Neck supple  No JVD present  No tracheal deviation present  No thyromegaly present  Cardiovascular: Normal rate, regular rhythm, normal heart sounds and intact distal pulses  Exam reveals no gallop and no friction rub  No murmur heard  Pulmonary/Chest: Effort normal and breath sounds normal  No respiratory distress  She has no wheezes  She has no rales  Examination of both breast demonstrate relatively engorged breast   I cannot appreciate a dominant mass in the upper outer quadrant today nor can the patient  There is no left axillary adenopathy  Other than being engorge the right breast is normal   There are no worrisome skin findings on either side  Abdominal: Soft  She exhibits no distension and no mass  There is no hepatomegaly  There is no tenderness  There is no rebound and no guarding  Musculoskeletal: Normal range of motion  She exhibits no edema or tenderness  Lymphadenopathy:     She has no cervical adenopathy  Neurological: She is alert and oriented to person, place, and time  No cranial nerve deficit  Skin: Skin is warm and dry  No rash noted  No erythema  Psychiatric: She has a normal mood and affect  Her behavior is normal    Vitals reviewed  Results & Discussion:     I reviewed her a bus reports as well as her ultrasound images  I have reviewed this also with Dr Noe Lazo  We will coordinate the surgery as outlined above  Advance Care Planning/Advance Directives:  I discussed the disease status, treatment plans and follow-up with the patient

## 2019-08-26 NOTE — PROGRESS NOTES
Surgical Oncology Follow Up       [address] [facility]  [facility] [place]  [address] [facility] [address]    Chemo Lex  1980  [id number]  [address] [facility] [facility]  [facility] [place]  [address] [id number]    Chief Complaint   Patient presents with    Pre-op Exam          Assessment & Plan:     The patient presents for a follow-up visit  Her automated breast ultrasound followed by directed ultrasound demonstrated that this is most likely unifocal lesion  I have discussed this with Dr Cordell Lacy as well  He has recommended a follow-up MRI and possibly 6 months to ensure no other abnormalities but not at this time  Consequently we have recommended that we proceed with breast conservation therapy  The patient's genetic testing was negative  We went through the process of informed consent for left breast needle localization lumpectomy in conjunction with a sentinel lymph node biopsy using blue and radioactive dye followed by an possible axillary dissection  Patient understands she would need additional adjuvant therapies including radiation therapy anti hormonal therapy and possibly chemotherapy  We reviewed the complications as outlined on the consent form  All questions were answered to the patient's satisfaction  We will coordinate the surgery in approximately 3 weeks  Cancer History:        Malignant neoplasm of upper-outer quadrant of left breast in female, estrogen receptor positive ([facility] )    8/13/2019 Biopsy     Left breast ultrasound-guided biopsy  A  2 o'clock, 14 cm from nipple  Invasive mammary carcinoma of no special type (ductal, not otherwise specified)  Grade 2  ER 90  ME 60  HER2 1+    B   Left axillary lymph node  Portion of lymph node, negative for carcinoma      8/16/2019 Genetic Testing     The following genes were evaluated: SUDHA, BRCA1, BRCA2, CDH1, CHEK2, PALB2, PTEN, STK11, TP53  Negative result  No pathogenic sequence variants or deletions/dupllications identified  [facility]           Interval History:   See Assessment & Plan    Review of Systems:   Review of Systems   All other systems reviewed and are negative        Past Medical History     Patient Active Problem List   Diagnosis    AMA (advanced maternal age) multigravida 27+    Previous  labor affecting pregnancy, antepartum    44 weeks gestation of pregnancy    Positive GBS test    Malignant neoplasm of upper-outer quadrant of left breast in female, estrogen receptor positive ([facility] )    Forceps delivery with baby delivered     Past Medical History:   Diagnosis Date    Abnormal Pap smear of cervix     BRCA gene mutation negative 2019    [facility]    HPV (human papilloma virus) infection     Postpartum depression     As per patient's spouse "it got severe"    [facility]    Rh negative state in antepartum period     Varicella     Vaccinated     Past Surgical History:   Procedure Laterality Date    COLPOSCOPY      US GUIDED BREAST BIOPSY LEFT COMPLETE Left 2019    US GUIDED BREAST LYMPH NODE BIOPSY LEFT Left 2019     Family History   Problem Relation Age of Onset    Stomach cancer Mother 37    Diabetes Maternal Grandmother     No Known Problems Father     No Known Problems Sister      Social History     Socioeconomic History    Marital status: /Civil Union     Spouse name: Not on file    Number of children: Not on file    Years of education: Not on file    Highest education level: Not on file   Occupational History    Not on file   Social Needs    Financial resource strain: Not on file    Food insecurity:     Worry: Not on file     Inability: Not on file    Transportation needs:     Medical: Not on file     Non-medical: Not on file   Tobacco Use    Smoking status: Never Smoker    Smokeless tobacco: Never Used   Substance and Sexual Activity    Alcohol use: No    Drug use: No    Sexual activity: Not on file   Lifestyle    Physical activity:     Days per week: Not on file     Minutes per session: Not on file    Stress: Not on file   Relationships    Social connections:     Talks on phone: Not on file     Gets together: Not on file     Attends [employer] service: Not on file     Active member of club or organization: Not on file     Attends meetings of clubs or organizations: Not on file     Relationship status: Not on file    Intimate partner violence:     Fear of current or ex partner: Not on file     Emotionally abused: Not on file     Physically abused: Not on file     Forced sexual activity: Not on file   Other Topics Concern    Not on file   Social History Narrative    Not on file       Current Outpatient Medications:     acetaminophen (TYLENOL) 325 mg tablet, Take 2 tablets (650 mg total) by mouth every 4 (four) hours as needed for mild pain, Disp: 30 tablet, Rfl: 0    Cholecalciferol (VITAMIN D PO), Take by mouth, Disp: , Rfl:     docusate sodium (COLACE) 100 mg capsule, Take 1 capsule (100 mg total) by mouth 2 (two) times a day, Disp: 10 capsule, Rfl: 0    doxylamine (UNISOM) 25 MG tablet, Take 1 tablet by mouth, Disp: , Rfl:     escitalopram (LEXAPRO) 10 mg tablet, Take 1 tablet (10 mg total) by mouth daily, Disp: 30 tablet, Rfl: 0    ibuprofen (MOTRIN) 600 mg tablet, Take 1 tablet (600 mg total) by mouth every 6 (six) hours as needed for moderate pain, Disp: 30 tablet, Rfl: 0    omeprazole (PriLOSEC) 20 mg delayed release capsule, Take 20 mg by mouth daily, Disp: , Rfl:     Pyridoxine HCl (VITAMIN B-6) 25 MG tablet, Take by mouth, Disp: , Rfl:     EPINEPHrine (EPIPEN) 0 3 mg/0 3 mL SOAJ, Inject 0 3 mL (0 3 mg total) into a muscle once for 1 dose, Disp: 0 6 mL, Rfl: 0    PRENATAL 27-1 MG TABS, Take by mouth, Disp: , Rfl:   Allergies   Allergen Reactions    Bee Venom        Physical Exam:     Vitals:    08/26/19 1309   BP: 102/80   Pulse: 68 Resp: 18   Temp: 97 8 °F (36 6 °C)     Physical Exam   Constitutional: She is oriented to person, place, and time  She appears well-developed and well-nourished  HENT:   Head: Normocephalic and atraumatic  Mouth/Throat: Oropharynx is clear and moist    Eyes: Pupils are equal, round, and reactive to light  EOM are normal    Neck: Normal range of motion  Neck supple  No JVD present  No tracheal deviation present  No thyromegaly present  Cardiovascular: Normal rate, regular rhythm, normal heart sounds and intact distal pulses  Exam reveals no gallop and no friction rub  No murmur heard  Pulmonary/Chest: Effort normal and breath sounds normal  No respiratory distress  She has no wheezes  She has no rales  Examination of both breast demonstrate relatively engorged breast   I cannot appreciate a dominant mass in the upper outer quadrant today nor can the patient  There is no left axillary adenopathy  Other than being engorge the right breast is normal   There are no worrisome skin findings on either side  Abdominal: Soft  She exhibits no distension and no mass  There is no hepatomegaly  There is no tenderness  There is no rebound and no guarding  Musculoskeletal: Normal range of motion  She exhibits no edema or tenderness  Lymphadenopathy:     She has no cervical adenopathy  Neurological: She is alert and oriented to person, place, and time  No cranial nerve deficit  Skin: Skin is warm and dry  No rash noted  No erythema  Psychiatric: She has a normal mood and affect  Her behavior is normal    Vitals reviewed  Results & Discussion:     I reviewed her a bus reports as well as her ultrasound images  I have reviewed this also with Dr Fredo Rod  We will coordinate the surgery as outlined above  Advance Care Planning/Advance Directives:  I discussed the disease status, treatment plans and follow-up with the patient

## 2019-08-26 NOTE — PATIENT INSTRUCTIONS
Pre-Surgery Instructions:   Medication Instructions    acetaminophen (TYLENOL) 325 mg tablet Stop taking 1 days prior to surgery    Cholecalciferol (VITAMIN D PO) Stop taking 1 days prior to surgery    docusate sodium (COLACE) 100 mg capsule Stop taking 1 days prior to surgery    doxylamine (UNISOM) 25 MG tablet Stop taking 1 days prior to surgery    escitalopram (LEXAPRO) 10 mg tablet Stop taking 1 days prior to surgery    ibuprofen (MOTRIN) 600 mg tablet Stop taking 1 week prior to surgery    omeprazole (PriLOSEC) 20 mg delayed release capsule Stop taking 1 days prior to surgery    Pyridoxine HCl (VITAMIN B-6) 25 MG tablet Stop taking 1 days prior to surgery           Breast Lumpectomy   AMBULATORY CARE:   What you need to know about a lumpectomy:  A lumpectomy is surgery to remove a mass in your breast  Breast tissue that surrounds the mass may also be taken  A lumpectomy is also known as breast-conserving surgery, a partial mastectomy, or a segmental mastectomy  How to prepare for a lumpectomy:   · You may need a mammogram or ultrasound before surgery  These tests may be done the same day as your surgery or at an earlier time  Your healthcare provider may use pictures from these tests to tom the location of the mass  The marker will show him where to make your incision  · Your healthcare provider will talk to you about how to prepare for surgery  He may tell you not to eat or drink anything after midnight on the day of your surgery  He will tell you what medicines to take or not take on the day of your surgery  You may need to stop taking blood thinners or aspirin several days before your surgery  Arrange for someone to drive you home and stay with you for 24 hours after surgery  This person can help care for you, and monitor for any problems  What will happen during a lumpectomy:  You will be given general anesthesia to keep you asleep and free from pain during surgery   You may be given an antibiotic through your IV to help prevent a bacterial infection  Your healthcare provider will make an incision in your breast and remove the mass  He may also remove breast tissue or lymph nodes that are close to the mass  A drain may be inserted near your incision to remove extra fluid  This will decrease swelling and help your incision heal  Your healthcare provider will close your incision with stitches or strips of medical tape and cover it with a bandage  He may also wrap a tight-fitting bandage around both of your breasts  This may decrease swelling, bleeding, and pain  What will happen after a lumpectomy:  Healthcare providers will monitor you until you are awake  You may able to go home when you are awake and your pain is controlled  Instead you may need to spend the night in the hospital    Risks of a lumpectomy:  You may bleed more than expected or get an infection  Nerves, blood vessels, and muscles may be damaged during your surgery  You may have swelling in your arm closest to the lumpectomy or where lymph nodes were removed  This swelling is called lymphedema  Lymphedema may cause tingling, numbness, stiffness, and weakness in your arm  This may be permanent  You may get a blood clot in your arm or leg  The blood clot may travel to your heart lungs, or brain  This may become life-threatening  Call 911 for any of the following:   · You feel lightheaded, short of breath, and have chest pain  · You cough up blood  · You have trouble breathing  Seek care immediately if:   · Blood soaks through your bandage  · Your stitches come apart  · Your bruise suddenly gets bigger  · Your leg or arm is larger than normal and painful  Contact your healthcare provider if:   · You have a fever or chills  · Your wound is red, swollen, or draining pus  · You have nausea or are vomiting  · Your skin is itchy, swollen, or you have a rash      · Your pain does not get better after you take pain medicine  · Your drain falls out or stops draining fluid  · Your drain has pus or foul-smelling fluid coming out of it  · You have questions or concerns about your condition or care  Medicines: You may need any of the following:  · Antibiotics  help prevent a bacterial infection  · Prescription pain medicine  may be given  Ask your healthcare provider how to take this medicine safely  Some prescription pain medicines contain acetaminophen  Do not take other medicines that contain acetaminophen without talking to your healthcare provider  Too much acetaminophen may cause liver damage  Prescription pain medicine may cause constipation  Ask your healthcare provider how to prevent or treat constipation  · NSAIDs , such as ibuprofen, help decrease swelling, pain, and fever  NSAIDs can cause stomach bleeding or kidney problems in certain people  If you take blood thinner medicine, always ask your healthcare provider if NSAIDs are safe for you  Always read the medicine label and follow directions  · Take your medicine as directed  Contact your healthcare provider if you think your medicine is not helping or if you have side effects  Tell him or her if you are allergic to any medicine  Keep a list of the medicines, vitamins, and herbs you take  Include the amounts, and when and why you take them  Bring the list or the pill bottles to follow-up visits  Carry your medicine list with you in case of an emergency  Care for your wound as directed: If you have a tight-fitting bandage, you can remove it in 24 to 48 hours, or as directed  Ask your healthcare provider when your incision can get wet  You may need to take a sponge bath until your drain is removed  Carefully wash around the incision with soap and water  It is okay to allow the soap and water to gently run over your incision  Gently pat dry the area and put on new, clean bandages as directed   Change your bandages when they get wet or dirty  Check your incision every day for redness, pus, or swelling  Self-care:   · Apply ice  on your breast for 15 to 20 minutes every hour or as directed  Use an ice pack, or put crushed ice in a plastic bag  Cover it with a towel  Ice helps prevent tissue damage and decreases swelling and pain  · Rest  as directed  Do not lift anything heavy  Do not push or pull with your arms  Take short walks around the house  Gradually walk further as you feel better  Ask your healthcare provider when you can return to your normal activities  · Empty your drain  as directed  You may need to write down how much you empty from your drain each day  Ask your healthcare provider for more information about how to empty your drain  · Wear a supportive bra  as directed  Wait until you remove the tight-fitting bandage to wear a bra  You may be given a surgical bra or told to wear a sports bra  A supportive bra may help hold your bandages in place  It may also help with swelling and pain  Do not  wear bras with lace or underwire  They may rub against your incision and cause discomfort  Arm stretches: Your healthcare provider may show you how to do arm stretches  Arm stretches may prevent stiff arms or shoulders  You may need to wait until after your drains are removed to begin stretching  Do not do arm stretches until your healthcare provider says it is okay  Ask your healthcare provider for more information about arm stretches  Follow up with your healthcare provider as directed:  Write down your questions so you remember to ask them during your visits  © 2017 [address] Information is for End User's use only and may not be sold, redistributed or otherwise used for commercial purposes  All illustrations and images included in CareNotes® are the copyrighted property of [employer]  or Alejandro Valencia  The above information is an  only   It is not intended as medical advice for individual conditions or treatments  Talk to your doctor, nurse or pharmacist before following any medical regimen to see if it is safe and effective for you  Breast Cancer [place] Lymph Node Biopsy   AMBULATORY CARE:   What you need to know about a sentinel lymph node biopsy (SLNB):  A sentinel lymph node (SLN) is usually the lymph node closest to the breast tumor  It is usually found in the armpit, or along the sternum (breastbone) or collarbone  A biopsy is a procedure used to find and remove a SLN  During the biopsy, the SLN will be tested for cancer cells  If the test is positive, it may mean that breast cancer has spread outside of your breast  This information can help your healthcare provider decide what other treatments you need  How to prepare for a SLNB:   · You may need a nuclear scan before your procedure  During a nuclear scan, healthcare providers will inject a small amount of radioactive liquid in your breast  Radioactive liquid will move to the location of your lymph nodes and help them show up better in pictures  A camera will take pictures of the lymph nodes  The pictures will help your healthcare provider plan for your procedure  · Your healthcare provider will talk to you about how to prepare for your procedure  He may tell you not to eat or drink anything after midnight on the day of your procedure  He will tell you what medicines to take or not take on the day of your procedure  You may be given contrast liquid during your biopsy  Tell your healthcare provider if you have ever had an allergic reaction to contrast liquid  Arrange for someone to drive you home and stay with you after your procedure  What will happen during a SLNB:   · You may be given an antibiotic through your IV to help prevent a bacterial infection  Tell the healthcare provider if you have ever had an allergic reaction to an antibiotic   You may be given general anesthesia to keep you asleep and free from pain during your procedure  You may instead be given local anesthesia to numb the area  With local anesthesia, you may still feel pressure or pushing during the procedure, but you should not feel any pain  · Your healthcare provider will inject blue contrast liquid, radioactive liquid, or both near the tumor  The liquid will move to the SLN  Your healthcare provider may use an instrument to help find the SLN  He will do this by gently moving an instrument over your skin  The instrument will show pictures of the SLN on a monitor  Your healthcare provider will make a small incision in the skin that covers the SLN  The incision is usually in your armpit or chest  The SLN will be removed and checked for cancer cells  If cancer is found, your healthcare provider may remove several more lymph nodes for testing  Your incision may be closed with stitches or strips of medical tape and covered with a bandage  What will happen after a SLNB:  Healthcare providers will monitor you until you are awake  You may be able to go home after you are awake and your pain is controlled  Your urine or bowel movement may be blue for 24 to 48 hours after your procedure  This is caused by the blue contrast liquid given to you during the procedure  You may have bruising or swelling at the biopsy site  This is normal and expected  The arm closest to the biopsy site may be sore  This should get better within 48 to 72 hours  Risks of a SLNB:  You may bleed more than expected or get an infection  You may develop a condition called lymphedema  Lymphedema is tissue swelling in your arm nearest to where the SLN was removed  You may have long-term pain or discomfort in your arm  Your skin in the arm may be permanently thick or hard  Your nerves may be damaged during your procedure  This may cause numbness or tingling in your arm  It may also cause difficulty moving your arm  You may have an allergic reaction to the contrast liquid   This may require medicine or other treatments  Seek care immediately if:   · Blood soaks through your bandage  · Your stitches come apart  · Your bruise suddenly gets larger and feels firm  Contact your healthcare provider if:   · You have a fever or chills  · Your wound is red, swollen, or draining pus  · You have nausea or are vomiting  · Your skin is itchy, swollen, or you have a rash  · Your pain does not get better after you take medicine for pain  · You have questions or concerns about your condition or care  Medicines: You may need any of the following:  · NSAIDs , such as ibuprofen, help decrease swelling, pain, and fever  This medicine is available with or without a doctor's order  NSAIDs can cause stomach bleeding or kidney problems in certain people  If you take blood thinner medicine, always ask your healthcare provider if NSAIDs are safe for you  Always read the medicine label and follow directions  · Acetaminophen  decreases pain and fever  It is available without a doctor's order  Ask how much to take and how often to take it  Follow directions  Read the labels of all other medicines you are using to see if they also contain acetaminophen, or ask your doctor or pharmacist  Acetaminophen can cause liver damage if not taken correctly  Do not use more than 4 grams (4,000 milligrams) total of acetaminophen in one day  · Prescription pain medicine  may be given  Ask your healthcare provider how to take this medicine safely  Some prescription pain medicines contain acetaminophen  Do not take other medicines that contain acetaminophen without talking to your healthcare provider  Too much acetaminophen may cause liver damage  Prescription pain medicine may cause constipation  Ask your healthcare provider how to prevent or treat constipation  · Take your medicine as directed  Contact your healthcare provider if you think your medicine is not helping or if you have side effects  Tell him or her if you are allergic to any medicine  Keep a list of the medicines, vitamins, and herbs you take  Include the amounts, and when and why you take them  Bring the list or the pill bottles to follow-up visits  Carry your medicine list with you in case of an emergency  Care for your incision wound as directed:  Ask your healthcare provider when your wound can get wet  Carefully wash around the wound with soap and water  It is okay to let soap and water gently run over your wound  Do not  scrub your wound  Gently pat dry the area and put on new, clean bandages as directed  Change your bandages when they get wet or dirty  If you have strips of medical tape, let them fall of on their own  It may take 10 to 14 days for them to fall off  Check your wound every day for signs of infection, such as redness, swelling, or pus  Do not put powders or lotions on your wound  If lymph nodes have been taken from your armpit, ask your healthcare provider when you can wear deodorant  Self-care:   · Apply ice  on your wound for 15 to 20 minutes every hour or as directed  Use an ice pack, or put crushed ice in a plastic bag  Cover it with a towel before you apply it to your skin  Ice helps prevent tissue damage and decreases swelling and pain  · Elevate  your arm nearest to the biopsy site as often as you can  This will help decrease swelling and pain  Prop your arm on pillows or blankets to keep it elevated above the level of your heart comfortably  · Do not do strenuous activities  for 24 to 48 hours  Strenuous activities include heavy lifting, sports, or running  If lymph nodes were taken from your armpit, do not push or pull with your arm  These activities may put too much stress on your wound  Rest and take short walks around the house  Ask your healthcare provider when you can return to your normal activities  · Drink plenty of liquids  as directed  This will help flush out contrast liquid from your body  Ask how much liquid to drink each day and which liquids are best for you  Ask your healthcare provider how to prevent lymphedema and infection:  Lymphedema is fluid buildup in fatty tissues under your skin  Lymphedema may happen in the arm closest to where lymph nodes were removed  An infection in your skin can make lymphedema worse  Ask your healthcare provider how you can decrease your risk for skin infections and lymphedema  Follow up with your healthcare provider as directed:  Write down your questions so you remember to ask them during your visits  © 2017 [address] [employer]  Information is for End User's use only and may not be sold, redistributed or otherwise used for commercial purposes  All illustrations and images included in CareNotes® are the copyrighted property of A D A M [employer] Inc  or Alejandro Valencia  The above information is an  only  It is not intended as medical advice for individual conditions or treatments  Talk to your doctor, nurse or pharmacist before following any medical regimen to see if it is safe and effective for you  Lumpectomía del seno   CUIDADO AMBULATORIO:   Lo que usted necesita saber sobre mehdi lumpectomía:  Attila Earnest lumpectomía es mehdi cirugía para extirpar mehdi masa de kwok seno  Es probable que [employer] extirpen el tejido del seno que rodea la masa  Mehdi lumpectomía también se conoce daisy cirugía para conservar el seno o mastectomía parcial o por segmentos  Cómo prepararse para mehdi lumpectomía:   · Es probable que usted necesite [place] o un ultrasonido antes de la [place]  Estos exámenes podrían [employer] mismo día que kwok cirugía o antes  Kwok médico podría usar imágenes de estos exámenes para marcar la ubicación de la masa  La ian le mostrará dónde hacer la incisión  · Kwok médico le explicará cómo debe prepararse para la [place]   Le puede indicar que no consuma ningún alimento ni bebida después de la medianoche del día de la cirugía  Carrolyn Matthew qué medicamentos puede francine el día de la [facility]  Es probable que usted tenga que dejar de francine anticoagulantes o aspirina varios días antes de kwok cirugía  Póngase de acuerdo con alguien para que lo lleve a kwok casa y que se quede con usted por 24 horas después de la [facility]  Esta persona puede ayudar a cuidarla y monitorearla en sophia de algún problema  Qué pasará [place] lumpectomía:  A usted le administrarán anestesia general para mantenerlo dormido y [place] de dolor alessia la [facility]  Podrían administrarle un antibiótico por vía intravenosa para evitar que contraiga mehdi infección bacteriana  Kwok médico le hará mehdi incisión en kwok seno y le quitará la masa  Es probable que [employer] quite tejido del seno o nódulos linfáticos que están cerca de la masa  Puede que le coloquen un drenaje cerca de la incisión para eliminar líquido adicional  [place] disminuirá la inflamación y le ayudará a kwok incisión a sanar debidamente  Kwok médico cerrará la incisión con puntos de sutura o cinta médica y la cubrirá con mehdi venda  Es probable que también envuelva ambos senos con un vendaje ajustado  [place] podría disminuir la inflamación, el sangrado y el dolor  Qué pasará después de mehdi lumpectomía:  Los médicos lo mantendrán bajo observación hasta que se despierte  Lo más probable es [facility] permitan irse a casa cuando se despierte y ya kwok dolor esté controlado  También es probable que usted necesite pasar la noche en el hospital    Riesgos de Philemon Jadon lumpectomía:  Usted podría sangrar más de lo esperado o contraer mehdi infección  Los nervios, vasos sanguíneos y músculos podrían sufrir daños alessia la [facility]  Puede que usted tenga inflamación en el brazo más cercano a la lumpectomía o en el área donde le extirparon los nódulos linfáticos  Esta inflamación se conoce daisy línfedema  La linfedema podría causar hormigueo, adormecimiento, rigidez y debilidad en el brazo  [place] podría ser [place]   Se le podría formar un coágulo sanguíneo en el brazo o la pierna  El coágulo puede viajar al corazón, los pulmones o el cerebro  [place] podría poner en peligro kwok tori  Llame al 911 en sophia de presentar lo siguiente:   · Usted se siente mareada, le hace falta el aire y tiene dolor de [place]  · Usted expectora tiesha  · Usted tiene dificultad para respirar  Busque atención médica de inmediato si:   · La tiesha empapa el vendaje  · Se desprenden los puntos de sutura  · El moretón de repente se vuelve más landon  · Kwok pierna o brazo está más landon que lo normal y adolorido  Pregúntele a kwok [place] vitaminas y minerales son adecuados para usted  · Usted tiene fiebre o escalofríos  · Kwok herida está arnaud, inflamada o drena pus  · Usted tiene náuseas o está vomitando  · Usted tiene comezón en la piel, inflamación o un sarpullido  · Kwok dolor no mejora después de francine kwok medicamento para el dolor  · El drenaje se sale o alicia de drenar líquido  · Kwok drenaje tiene pus o un líquido maloliente saliendo del mismo  · Usted tiene preguntas o inquietudes acerca de kwok condición o cuidado  Medicamentos:  Es posible que usted necesite alguno de los siguientes:  · Antibióticos  ayudan a evitar mehdi infección bacteriana  · Un medicamento con receta para el dolor  podrían ser [place]  Pregunte al médico cómo debe francine mj medicamento de forma sotelo  Algunos medicamentos recetados para el dolor contienen acetaminofén  No tome otros medicamentos que contengan acetaminofén sin consultarlo con kwok médico  Demasiado acetaminofeno puede causar daño al hígado  Los medicamentos recetados para el dolor podrían causar estreñimiento  Pregunte a kwok médico daisy prevenir o tratar estreñimiento  · AINEs (Analgésicos antiinflamatorios no esteroides) daisy el ibuprofeno, ayudan a disminuir la inflamación, el dolor y la [place]  Los AINEs pueden causar sangrado estomacal o problemas renales en ciertas personas   Si usted yessy un medicamento anticoagulante, siempre pregúntele a kwok médico si los PAULO son seguros para usted  Siempre jaja la etiqueta de mj medicamento y [place] instrucciones  · [place] annie medicamentos daisy se le haya indicado  Consulte con kwok médico si usted shanna que kwok medicamento no le está ayudando o si presenta efectos secundarios  Infórmele si es alérgico a cualquier medicamento  Mantenga mehdi lista actualizada de los [place], las vitaminas y los productos herbales que yessy  Incluya los siguientes datos de los medicamentos: cantidad, frecuencia y motivo de administración  Traiga con usted la lista o los envases de la píldoras a annie citas de seguimiento  Lleve la lista de los medicamentos con usted en sophia de mehdi emergencia  Siga las instrucciones de kwok médico sobre el cuidado de annie heridas:  Si le pusieron mehdi venda [place], se la puede quitar dentro de 24 a 48 horas o según indicaciones  Pregúntele a kwok médico cuándo puede mojarse la incisión  Es posible que deba francine un baño de esponja hasta que le retiren los drenajes  Lave cuidadosamente el área alrededor de la incisión con [place]  No hay problema si alicia correr el agua y el jabón libremente por la incisión  Séqu[place] el área suavemente y póngase mehdi venda nueva y limpia según indicaciones  [place]ambie annie vendajes cuando se mojen o ensucien  Revise kwok incisión todos los días para jadiel si tiene enrojecimiento, pus o inflamación  Cuidados personales:   · Aplique hielo  en el seno de 15 a 20 minutos [address] indicaciones  Use un paquete de hielo o ponga hielo molido dentro de [employer]  Cúbrala con mehdi toalla  El hielo ayuda a evitar daño al tejido y a disminuir la inflamación y el dolor  · Descanse  según las indicaciones  No levante nada pesado  No empuje ni jale con annie brazos  Empiece a caminar un poco alrededor de la casa  Camine gradualmente más cada día   Pregunte a kwok médico cuándo puede retomar annie actividades habituales  · Vacíe kwok [address] indicaciones  Es probable que tenga que anotar la cantidad que vacía de kwok drenaje a diario  [place] a kwok médico más información sobre cómo vaciar kwok drenaje  · Use un brasier de soporte  según las indicaciones  Espere hasta quitarse la venda ajustada para usar sostén  Es posible que le suministren un sostén de soporte o le indiquen que use un sostén deportivo  Un brasier de apoyo podría ayudarle a sostener las vendas en kwok lugar  También puede ayudar con la inflamación y el dolor  No  use un sostén con encajes ni con varilla  Estos le pueden rozar contra kwok incisión y causarle molestias  Ejercicios de estiramiento para los brazos:  Kwok médico le mostrara cómo hacer el estiramiento para los brazos  Los ejercicios de estiramiento para el brazo pueden prevenir la rigidez [employer] brazos o los hombros  Es posible que tenga que esperar hasta después de que le retiren los drenajes para empezar con el estiramiento  No pierre ejercicios de estiramiento para el brazo hasta que kwok médico lo autorice  [place] a kwok médico mayor información sobre los ejercicios de estiramiento para los brazos  Acuda a annie consultas de control con kwok médico según le indicaron  Anote annie preguntas para que se acuerde de hacerlas alessia annie visitas  © 2017 2600 [facility] Information is for End User's use only and may not be sold, redistributed or otherwise used for commercial purposes  All illustrations and images included in CareNotes® are the copyrighted property of A D A M , Inc  or Alejandro Valencia  Esta información es sólo para uso en educación  Kwok intención no es darle un consejo médico sobre enfermedades o tratamientos  Colsulte con kwok [place]oul[place] Kind farmacéutico antes de seguir cualquier régimen médico para saber si es seguro y efectivo para usted          Biopsia de ganglio linfático centinela en pacientes con cáncer de mama   CUIDADO AMBULATORIO:   Alma Garza law saber acerca de mehdi biopsia de ganglio linfático centinela [facility]):  Un ganglio linfático centinela (GLC) generalmente es el ganglio linfático más cercano al tumor  Normalmente se encuentra en la axila o a lo mario del esternón o de la clavícula  Mehdi biopsia es un procedimiento que se [place] para encontrar y extraer un [address]  Alessia la biopsia, el GLC será examinado para células de cáncer  Si la prueba es positiva, es posible que el cáncer de mama se haya extendido fuera de limon seno  Esta información puede ayudar a limon médico a decidir qué otros tratamientos usted necesita  Cómo prepararse para mehdi BGLC:   · Usted podría necesitar un escán nuclear antes de limon procedimiento  Alessia un escán nuclear, los médicos inyectarán mehdi pequeña cantidad de líquido radioactivo en el seno  El líquido radioactivo se moverá a la ubicación de annie ganglios linfáticos y ayudará a que se vean mejor en las imágenes  Mehdi cámara tomará imágenes de los ganglios linfáticos  [place] a limon médico a planear limon procedimiento  · Limon médico hablará con usted acerca de cómo prepararse para limon procedimiento  Es posible que le diga que no coma o tome nada después de la medianoche del día de limon procedimiento  Le dirán qué medicamentos francine o no francine el día de limon procedimiento  Podrían administrarle líquido de contraste alessia limon biopsia  Informe a limon médico si alguna vez tuvo mehdi reacción alérgica a un medio de [place]  [place] a alguien que lo lleve a casa y se quede con usted después de limon procedimiento  [place] alessia mehdi BGLC:   · Podrían administrarle un antibiótico por vía intravenosa para evitar que contraiga mehdi infección bacteriana  Informe a limon médico si usted ha tenido mehdi reacción alérgica a algún antibiótico  Es posible que le administren anestesia general para mantenerlo dormido y sin dolor alessia el procedimiento  O en limon lugar podrían administrarle anestesia local para adormecer el área   Con la anestesia local, usted todavía podría sentir presión o molestia alessia el procedimiento, harlan no debería sentir dolor  · Kwok médico le inyectará un líquido de [place], líquido radioactivo o ambos cerca del tumor  El líquido se moverá hacia el ganglio linfático centinela ([address])  Kwok médico podría usar un instrumento que le ayude a encontrar el [address]  Él realizará esto moviendo suavemente el instrumento sobre kwok piel  El instrumento mostrará imágenes del GLC en un monitor  Kwok médico hará mehdi incisión pequeña sobre la piel que cubre al GLC  La incisión generalmente es en la axila o el pecho  El [address] y revisado por células cancerosas  Si se encuentra cáncer, kwok médico podría extraer varios ganglios linfáticos adicionales para examinarlos  Es posible que le cierren la incisión con puntos de sutura o tiras de cinta médica y que la cubran con un vendaje  Torey Shaker después de mehdi BGLC:  Los médicos lo mantendrán bajo observación hasta que se despierte  Es posible que usted pueda ir a casa mehdi vez que se despierte y que el dolor esté controlado  Kwok orina y daisy evacuaciones intestinales podrían estar azules por 24 a 50 horas después de kwok procedimiento  [place] se debe al líquido de [employer] roman alessia el procedimiento  Usted podría presentar inflamación y moretones en el sitio de la biopsia  [place] es normal y es de [place]  El brazo más cercano al área de la biopsia podría estar adolorido  [place] debería mejorar dentro de 48 a 72 horas  Riesgos de la BGLC:  Usted podría sangrar más de lo esperado o contraer mehdi infección  Usted podría presentar mehdi condición que se llama linfedema  El linfedema es la inflamación del tejido en el brazo más cercano al sitio donde se extrajo el GLC  Es posible que usted tenga molestias o dolor de larga duración en el [place]  La piel del brazo podría volverse gruesa o dura de [employer]  Daisy nervios podrían sufrir daños alessia el procedimiento   [place] podría causar entumecimiento o sensación de hormigueo en el brazo  También podría causar dificultad para  el [place]  Usted puede sufrir mehdi reacción alérgica al líquido de [place]  [place] podría requerir [place] u otros tratamientos  Busque atención médica de inmediato si:   · La tiesha empapa el vendaje  · Se desprenden los puntos de sutura  · Kwok moretón repentinamente se hace más landon y se siente firme  Pregúntele a kwok [employer] vitaminas y minerales son adecuados para usted  · Usted tiene fiebre o escalofríos  · Kwok herida está arnaud, inflamada o drena pus  · Usted tiene náuseas o está vomitando  · Usted tiene comezón en la piel, inflamación o un sarpullido  · El dolor no mejora después de francine medicamentos para el dolor  · Usted tiene preguntas o inquietudes acerca de kwok condición o cuidado  Medicamentos:  Es posible que usted necesite alguno de los siguientes:  · AINEs (Analgésicos antiinflamatorios no esteroides) daisy el ibuprofeno, ayudan a disminuir la inflamación, el dolor y la fiebre  Mj medicamento esta disponible con o sin mehdi receta médica  Los AINEs pueden causar sangrado estomacal o problemas renales en ciertas personas  Si usted yessy un medicamento anticoagulante, siempre pregúntele a kwok médico si los PAULO son seguros para usted  Siempre hillary la etiqueta de mj medicamento y [place] instrucciones  · Acetaminofeno:  johan el dolor y baja la fiebre  Está disponible sin receta médica  Pregunte la cantidad y la frecuencia con que debe tomarlos  [address]  Hillary las etiquetas de todos los demás medicamentos que esté usando para saber si también contienen acetaminofén, o pregunte a kwok médico o farmacéutico  El acetaminofén puede causar daño en el hígado cuando no se yessy de forma correcta  No use más de 4 gramos (4000 miligramos) en total de acetaminofeno en un día  · Un medicamento con receta para el dolor  podrían ser Alex Longest   Pregunte al médico cómo debe francine mj medicamento de forma sotelo  Algunos medicamentos recetados para el dolor contienen acetaminofén  No tome otros medicamentos que contengan acetaminofén sin consultarlo con kwok médico  Demasiado acetaminofeno puede causar daño al hígado  Los medicamentos recetados para el dolor podrían causar estreñimiento  Pregunte a kwok médico daisy prevenir o tratar estreñimiento  · [place] annie medicamentos daisy se le haya indicado  Consulte con kwok médico si usted shanna que kwok medicamento no le está ayudando o si presenta efectos secundarios  Infórmele si es alérgico a cualquier medicamento  Mantenga mehdi lista actualizada de los [place], las vitaminas y los productos herbales que yessy  Incluya los siguientes datos de los medicamentos: cantidad, frecuencia y motivo de administración  Traiga con usted la lista o los envases de la píldoras a annie citas de seguimiento  Lleve la lista de los medicamentos con usted en sophia de mehdi emergencia  Cuide la herida de kwok incisión daisy le indiquen:  Pregunte a kwok médico cuándo se puede mojar la herida  Lave cuidadosamente el área alrededor de la herida con [place]  Puede dejar correr ligeramente el agua y jabón sobre la herida  No  frote kwok herida  [place]équ[place] el área suavemente y póngase mehdi venda nueva y limpia según indicaciones  [place] annie vendajes cuando se mojen o ensucien  Si usted tiene tiras de [address], déjelas caerse por si solas  Pueden pasar de 10 a 14 días para que se caigan  Revise kwok herida diariamente en busca de signos de infección, daisy enrojecimiento, inflamación o pus  No aplique polvos ni lociones en la incisión  Si le extrajeron un ganglio linfático de kwok axila, pregúntele a kwok médico cuándo puede usar desodorante  Cuidados personales:   · Aplique hielo  en la herida de 15 a 20 minutos cada hora o daisy se le indique  Use un paquete de hielo o ponga hielo molido dentro de [employer]  Cúbralo con mehdi toalla antes de aplicarlo sobre kwok piel  El hielo ayuda a evitar daño al tejido y a disminuir la inflamación y el dolor  · Eleve  el brazo que esté más cerca del área de la biopsia con la mayor frecuencia posible  [place]ren[place] va a disminuir inflamación y el dolor  Ponga el brazo sobre almohadas o sábanas para mantenerlo elevado por encima del nivel del corazón cómodamente  · No realice actividades agotadoras  por 24 a 48 horas  Las actividades agotadoras incluyen levantar cosas pesadas, practicar deportes o correr  Si le extrajeron ganglios linfáticos de limon axila, no use limon brazo para empujar o jalar algo  Estas actividades podrían ejercer demasiada presión [employer] herida  Descanse y camine un poco alrededor de la casa  Pregunte a limon médico cuándo puede retomar annie [employer]  · [place] bastantes líquidos  según las indicaciones  G[place]ola le ayudará a eliminar el líquido de contraste de limon cuerpo  Pregunte cuánto líquido debe francine cada día y cuáles líquidos son los más adecuados para usted  Pregunte a limon médico daisy evitar el linfedema y la infección:  El linfedema es la acumulación de líquido en los tejido de grasa debajo de limon piel  El linfedema podría ocurrir en el brazo más cercano al área donde se extirparon los ganglios linfáticos  [place] en limon piel puede empeorar el linfedema  Pregunte a limon médico daisy puede disminuir limon riesgo de infecciones en la piel y el linfedema  Acuda a annie consultas de control con limon médico según le indicaron  Anote annie preguntas para que se acuerde de hacerlas alessia annie visitas  © 2017 26[address] Information is for End User's use only and may not be sold, redistributed or otherwise used for commercial purposes  All illustrations and images included in CareNotes® are the copyrighted property of A D A M , Inc  or Alejandro Valencia  Esta información es sólo para uso en educación  Limon intención no es darle un consejo médico sobre enfermedades o tratamientos   Colsulte con limon Marta Graham farmacéutico antes de seguir cualquier régimen médico para saber si es seguro y efectivo para usted

## 2019-08-27 ENCOUNTER — TELEPHONE (OUTPATIENT)
Dept: SURGICAL ONCOLOGY | Facility: CLINIC | Age: 39
End: 2019-08-27

## 2019-08-27 ENCOUNTER — TELEPHONE (OUTPATIENT)
Dept: OBGYN CLINIC | Facility: CLINIC | Age: 39
End: 2019-08-27

## 2019-08-27 NOTE — TELEPHONE ENCOUNTER
Patient's  called with multiple questions regarding the patient's surgery  Pre-Op surgery instructions reviewed over the phone and all questions answered to his satisfaction

## 2019-08-27 NOTE — TELEPHONE ENCOUNTER
Spoke with pt's  and advised that it could take up to 2-3 weeks for lactation stop and that it is different per patient and that she is also to try Motrin 600mg every 4-6hrs around the clock, wear tight bras and use ice packs

## 2019-08-28 LAB — PLACENTA IN STORAGE: NORMAL

## 2019-08-30 NOTE — TELEPHONE ENCOUNTER
Patient's  called again to discuss surgery timing  He stated that after a discussion with the patient, they would like to push it back until after 9/20 when family would be available to help and to give the patient's breast more time to return to normal after her pregnancy  Informed him that the next available surgery date would be 9/24, which is within the 6 week time-frame that Dr Kevin Steele suggested  Patient's  verbalized understanding and stated that's when the patient wanted her surgery done  Explained that the surgery scheduler would be made aware and if any additional information was needed, a call would be made on Tuesday, 9/3

## 2019-09-04 ENCOUNTER — POSTPARTUM VISIT (OUTPATIENT)
Dept: OBGYN CLINIC | Facility: CLINIC | Age: 39
End: 2019-09-04

## 2019-09-04 VITALS
HEIGHT: 70 IN | WEIGHT: 175 LBS | BODY MASS INDEX: 25.05 KG/M2 | SYSTOLIC BLOOD PRESSURE: 120 MMHG | DIASTOLIC BLOOD PRESSURE: 70 MMHG

## 2019-09-04 DIAGNOSIS — C50.912 MALIGNANT NEOPLASM OF LEFT FEMALE BREAST, UNSPECIFIED ESTROGEN RECEPTOR STATUS, UNSPECIFIED SITE OF BREAST (HCC): ICD-10-CM

## 2019-09-04 PROCEDURE — 99024 POSTOP FOLLOW-UP VISIT: CPT | Performed by: OBSTETRICS & GYNECOLOGY

## 2019-09-04 NOTE — PROGRESS NOTES
The patient is here for a two week post-partum  The patient has light bleeding  The patient feels soreness where she had stitches  No cramping       The patient is having more anxiety than usual

## 2019-09-04 NOTE — PROGRESS NOTES
Patient is taking Lexapro 10mg daily  Her PHQ score is 20  She has no suicidal or homicidal ideations  Patient has a breast surgery scheduled on September 21st with Dr Melanie Mclaughlin  Despite patient's situation she seems in good spirits  Patient had stopped breastfeeding  Her vaginal bleeding is minimal     Physical exam:  Heart regular rate no murmurs  Chest clear bilateral breath sounds  Abdomen soft nontender  Impression normal 2 week postpartum exam   Abnormal postpartum depression scale  On Lexapro as prescribed by her psychiatrist   No signs of suicidal ideation      Plan:  Return to the office in 4 weeks for pelvic exam

## 2019-09-10 DIAGNOSIS — Z86.59 HISTORY OF POSTPARTUM DEPRESSION: ICD-10-CM

## 2019-09-10 DIAGNOSIS — Z87.59 HISTORY OF POSTPARTUM DEPRESSION: ICD-10-CM

## 2019-09-15 ENCOUNTER — OFFICE VISIT (OUTPATIENT)
Dept: LAB | Facility: HOSPITAL | Age: 39
End: 2019-09-15
Attending: SURGERY
Payer: COMMERCIAL

## 2019-09-15 DIAGNOSIS — Z00.8 HEALTH EXAMINATION IN POPULATION SURVEY: Primary | ICD-10-CM

## 2019-09-15 DIAGNOSIS — C50.412 MALIGNANT NEOPLASM OF UPPER-OUTER QUADRANT OF LEFT BREAST IN FEMALE, ESTROGEN RECEPTOR POSITIVE (HCC): ICD-10-CM

## 2019-09-15 DIAGNOSIS — Z00.8 OTHER SPECIFIED GENERAL MEDICAL EXAMINATION: ICD-10-CM

## 2019-09-15 DIAGNOSIS — Z17.0 MALIGNANT NEOPLASM OF UPPER-OUTER QUADRANT OF LEFT BREAST IN FEMALE, ESTROGEN RECEPTOR POSITIVE (HCC): ICD-10-CM

## 2019-09-15 LAB
25(OH)D3 SERPL-MCNC: 29.2 NG/ML (ref 30–100)
CHOLEST SERPL-MCNC: 268 MG/DL (ref 50–200)
EST. AVERAGE GLUCOSE BLD GHB EST-MCNC: 97 MG/DL
HBA1C MFR BLD: 5 % (ref 4.2–6.3)
HDLC SERPL-MCNC: 97 MG/DL (ref 40–60)
LDLC SERPL CALC-MCNC: 158 MG/DL (ref 0–100)
NONHDLC SERPL-MCNC: 171 MG/DL
TRIGL SERPL-MCNC: 65 MG/DL
TSH SERPL DL<=0.05 MIU/L-ACNC: 0.7 UIU/ML (ref 0.36–3.74)

## 2019-09-15 PROCEDURE — 82627 DEHYDROEPIANDROSTERONE: CPT

## 2019-09-15 PROCEDURE — 84443 ASSAY THYROID STIM HORMONE: CPT

## 2019-09-15 PROCEDURE — 93005 ELECTROCARDIOGRAM TRACING: CPT

## 2019-09-15 PROCEDURE — 83036 HEMOGLOBIN GLYCOSYLATED A1C: CPT

## 2019-09-15 PROCEDURE — 82542 COL CHROMOTOGRAPHY QUAL/QUAN: CPT

## 2019-09-15 PROCEDURE — 80061 LIPID PANEL: CPT

## 2019-09-15 PROCEDURE — 82306 VITAMIN D 25 HYDROXY: CPT

## 2019-09-16 LAB
ATRIAL RATE: 53 BPM
DHEA-S SERPL-MCNC: 79.4 UG/DL (ref 57.3–279.2)
P AXIS: 39 DEGREES
PR INTERVAL: 164 MS
QRS AXIS: 59 DEGREES
QRSD INTERVAL: 82 MS
QT INTERVAL: 464 MS
QTC INTERVAL: 435 MS
T WAVE AXIS: 43 DEGREES
VENTRICULAR RATE: 53 BPM

## 2019-09-16 PROCEDURE — 93010 ELECTROCARDIOGRAM REPORT: CPT | Performed by: INTERNAL MEDICINE

## 2019-09-18 ENCOUNTER — TRANSCRIBE ORDERS (OUTPATIENT)
Dept: LAB | Facility: HOSPITAL | Age: 39
End: 2019-09-18

## 2019-09-18 DIAGNOSIS — Z17.0 MALIGNANT NEOPLASM OF UPPER-OUTER QUADRANT OF LEFT BREAST IN FEMALE, ESTROGEN RECEPTOR POSITIVE (HCC): Primary | ICD-10-CM

## 2019-09-18 DIAGNOSIS — Z00.8 OTHER SPECIFIED GENERAL MEDICAL EXAMINATION: ICD-10-CM

## 2019-09-18 DIAGNOSIS — C50.412 MALIGNANT NEOPLASM OF UPPER-OUTER QUADRANT OF LEFT BREAST IN FEMALE, ESTROGEN RECEPTOR POSITIVE (HCC): Primary | ICD-10-CM

## 2019-09-18 DIAGNOSIS — Z00.8 HEALTH EXAMINATION IN POPULATION SURVEY: ICD-10-CM

## 2019-09-18 LAB — MISCELLANEOUS LAB TEST RESULT: NORMAL

## 2019-09-19 ENCOUNTER — TRANSCRIBE ORDERS (OUTPATIENT)
Dept: RADIOLOGY | Facility: HOSPITAL | Age: 39
End: 2019-09-19

## 2019-09-19 ENCOUNTER — HOSPITAL ENCOUNTER (OUTPATIENT)
Dept: RADIOLOGY | Facility: HOSPITAL | Age: 39
Discharge: HOME | End: 2019-09-19
Attending: SURGERY
Payer: COMMERCIAL

## 2019-09-19 ENCOUNTER — APPOINTMENT (OUTPATIENT)
Dept: LAB | Facility: HOSPITAL | Age: 39
End: 2019-09-19
Attending: FAMILY MEDICINE
Payer: COMMERCIAL

## 2019-09-19 DIAGNOSIS — Z00.8 OTHER SPECIFIED GENERAL MEDICAL EXAMINATION: ICD-10-CM

## 2019-09-19 DIAGNOSIS — C50.412 MALIGNANT NEOPLASM OF UPPER-OUTER QUADRANT OF LEFT BREAST IN FEMALE, ESTROGEN RECEPTOR POSITIVE (HCC): ICD-10-CM

## 2019-09-19 DIAGNOSIS — Z00.8 HEALTH EXAMINATION IN POPULATION SURVEY: ICD-10-CM

## 2019-09-19 DIAGNOSIS — Z17.0 MALIGNANT NEOPLASM OF UPPER-OUTER QUADRANT OF LEFT BREAST IN FEMALE, ESTROGEN RECEPTOR POSITIVE (HCC): ICD-10-CM

## 2019-09-19 PROCEDURE — 71046 X-RAY EXAM CHEST 2 VIEWS: CPT

## 2019-09-19 PROCEDURE — 82542 COL CHROMOTOGRAPHY QUAL/QUAN: CPT

## 2019-09-19 PROCEDURE — 36415 COLL VENOUS BLD VENIPUNCTURE: CPT

## 2019-09-23 LAB — MISCELLANEOUS LAB TEST RESULT: NORMAL

## 2019-09-24 ENCOUNTER — HOSPITAL ENCOUNTER (OUTPATIENT)
Facility: HOSPITAL | Age: 39
Setting detail: OUTPATIENT SURGERY
Discharge: HOME | End: 2019-09-24
Attending: SURGERY | Admitting: SURGERY
Payer: COMMERCIAL

## 2019-09-24 ENCOUNTER — HOSPITAL ENCOUNTER (OUTPATIENT)
Dept: NUCLEAR MEDICINE | Facility: HOSPITAL | Age: 39
Discharge: HOME | End: 2019-09-24
Attending: SURGERY
Payer: COMMERCIAL

## 2019-09-24 ENCOUNTER — APPOINTMENT (OUTPATIENT)
Dept: MAMMOGRAPHY | Facility: HOSPITAL | Age: 39
End: 2019-09-24
Payer: COMMERCIAL

## 2019-09-24 ENCOUNTER — ANESTHESIA EVENT (OUTPATIENT)
Dept: PERIOP | Facility: HOSPITAL | Age: 39
End: 2019-09-24
Payer: COMMERCIAL

## 2019-09-24 ENCOUNTER — HOSPITAL ENCOUNTER (OUTPATIENT)
Dept: MAMMOGRAPHY | Facility: HOSPITAL | Age: 39
Discharge: HOME | End: 2019-09-24
Attending: SURGERY
Payer: COMMERCIAL

## 2019-09-24 ENCOUNTER — ANESTHESIA (OUTPATIENT)
Dept: PERIOP | Facility: HOSPITAL | Age: 39
End: 2019-09-24
Payer: COMMERCIAL

## 2019-09-24 VITALS
DIASTOLIC BLOOD PRESSURE: 82 MMHG | RESPIRATION RATE: 20 BRPM | HEART RATE: 44 BPM | OXYGEN SATURATION: 99 % | WEIGHT: 170 LBS | TEMPERATURE: 97.7 F | SYSTOLIC BLOOD PRESSURE: 128 MMHG | BODY MASS INDEX: 23.8 KG/M2 | HEIGHT: 71 IN

## 2019-09-24 VITALS — HEIGHT: 70 IN | BODY MASS INDEX: 25.05 KG/M2 | WEIGHT: 175 LBS

## 2019-09-24 DIAGNOSIS — C50.412 MALIGNANT NEOPLASM OF UPPER-OUTER QUADRANT OF LEFT BREAST IN FEMALE, ESTROGEN RECEPTOR POSITIVE (HCC): ICD-10-CM

## 2019-09-24 DIAGNOSIS — Z17.0 MALIGNANT NEOPLASM OF UPPER-OUTER QUADRANT OF LEFT BREAST IN FEMALE, ESTROGEN RECEPTOR POSITIVE (HCC): ICD-10-CM

## 2019-09-24 LAB
EXT PREGNANCY TEST URINE: NEGATIVE
EXT. CONTROL: NORMAL

## 2019-09-24 PROCEDURE — 38792 RA TRACER ID OF SENTINL NODE: CPT

## 2019-09-24 PROCEDURE — 38792 RA TRACER ID OF SENTINL NODE: CPT | Performed by: SURGERY

## 2019-09-24 PROCEDURE — 88342 IMHCHEM/IMCYTCHM 1ST ANTB: CPT | Performed by: PATHOLOGY

## 2019-09-24 PROCEDURE — 38900 IO MAP OF SENT LYMPH NODE: CPT | Performed by: SURGERY

## 2019-09-24 PROCEDURE — 88341 IMHCHEM/IMCYTCHM EA ADD ANTB: CPT | Performed by: PATHOLOGY

## 2019-09-24 PROCEDURE — 19281 PERQ DEVICE BREAST 1ST IMAG: CPT

## 2019-09-24 PROCEDURE — 19301 PARTIAL MASTECTOMY: CPT | Performed by: SURGERY

## 2019-09-24 PROCEDURE — A9541 TC99M SULFUR COLLOID: HCPCS

## 2019-09-24 PROCEDURE — 19301 PARTIAL MASTECTOMY: CPT | Performed by: PHYSICIAN ASSISTANT

## 2019-09-24 PROCEDURE — 88307 TISSUE EXAM BY PATHOLOGIST: CPT | Performed by: PATHOLOGY

## 2019-09-24 PROCEDURE — NC001 PR NO CHARGE: Performed by: PHYSICIAN ASSISTANT

## 2019-09-24 PROCEDURE — 38525 BIOPSY/REMOVAL LYMPH NODES: CPT | Performed by: SURGERY

## 2019-09-24 RX ORDER — MAGNESIUM HYDROXIDE 1200 MG/15ML
LIQUID ORAL AS NEEDED
Status: DISCONTINUED | OUTPATIENT
Start: 2019-09-24 | End: 2019-09-24 | Stop reason: HOSPADM

## 2019-09-24 RX ORDER — OXYCODONE HYDROCHLORIDE AND ACETAMINOPHEN 5; 325 MG/1; MG/1
1 TABLET ORAL EVERY 4 HOURS PRN
Status: DISCONTINUED | OUTPATIENT
Start: 2019-09-24 | End: 2019-09-24 | Stop reason: HOSPADM

## 2019-09-24 RX ORDER — METOCLOPRAMIDE HYDROCHLORIDE 5 MG/ML
10 INJECTION INTRAMUSCULAR; INTRAVENOUS ONCE AS NEEDED
Status: DISCONTINUED | OUTPATIENT
Start: 2019-09-24 | End: 2019-09-24 | Stop reason: HOSPADM

## 2019-09-24 RX ORDER — SODIUM CHLORIDE, SODIUM LACTATE, POTASSIUM CHLORIDE, CALCIUM CHLORIDE 600; 310; 30; 20 MG/100ML; MG/100ML; MG/100ML; MG/100ML
125 INJECTION, SOLUTION INTRAVENOUS CONTINUOUS
Status: DISCONTINUED | OUTPATIENT
Start: 2019-09-24 | End: 2019-09-24 | Stop reason: HOSPADM

## 2019-09-24 RX ORDER — BUPIVACAINE HYDROCHLORIDE 2.5 MG/ML
INJECTION, SOLUTION EPIDURAL; INFILTRATION; INTRACAUDAL AS NEEDED
Status: DISCONTINUED | OUTPATIENT
Start: 2019-09-24 | End: 2019-09-24 | Stop reason: HOSPADM

## 2019-09-24 RX ORDER — GLYCOPYRROLATE 0.2 MG/ML
INJECTION INTRAMUSCULAR; INTRAVENOUS AS NEEDED
Status: DISCONTINUED | OUTPATIENT
Start: 2019-09-24 | End: 2019-09-24 | Stop reason: SURG

## 2019-09-24 RX ORDER — KETOROLAC TROMETHAMINE 30 MG/ML
INJECTION, SOLUTION INTRAMUSCULAR; INTRAVENOUS AS NEEDED
Status: DISCONTINUED | OUTPATIENT
Start: 2019-09-24 | End: 2019-09-24 | Stop reason: SURG

## 2019-09-24 RX ORDER — ONDANSETRON 2 MG/ML
4 INJECTION INTRAMUSCULAR; INTRAVENOUS ONCE AS NEEDED
Status: DISCONTINUED | OUTPATIENT
Start: 2019-09-24 | End: 2019-09-24 | Stop reason: HOSPADM

## 2019-09-24 RX ORDER — ONDANSETRON 2 MG/ML
INJECTION INTRAMUSCULAR; INTRAVENOUS AS NEEDED
Status: DISCONTINUED | OUTPATIENT
Start: 2019-09-24 | End: 2019-09-24 | Stop reason: SURG

## 2019-09-24 RX ORDER — DEXAMETHASONE SODIUM PHOSPHATE 10 MG/ML
INJECTION, SOLUTION INTRAMUSCULAR; INTRAVENOUS AS NEEDED
Status: DISCONTINUED | OUTPATIENT
Start: 2019-09-24 | End: 2019-09-24 | Stop reason: SURG

## 2019-09-24 RX ORDER — HYDROMORPHONE HCL/PF 1 MG/ML
0.5 SYRINGE (ML) INJECTION
Status: DISCONTINUED | OUTPATIENT
Start: 2019-09-24 | End: 2019-09-24 | Stop reason: HOSPADM

## 2019-09-24 RX ORDER — CEFAZOLIN SODIUM 2 G/50ML
2000 SOLUTION INTRAVENOUS ONCE
Status: COMPLETED | OUTPATIENT
Start: 2019-09-24 | End: 2019-09-24

## 2019-09-24 RX ORDER — FENTANYL CITRATE/PF 50 MCG/ML
50 SYRINGE (ML) INJECTION
Status: DISCONTINUED | OUTPATIENT
Start: 2019-09-24 | End: 2019-09-24 | Stop reason: HOSPADM

## 2019-09-24 RX ORDER — LIDOCAINE HYDROCHLORIDE 10 MG/ML
INJECTION, SOLUTION INFILTRATION; PERINEURAL AS NEEDED
Status: DISCONTINUED | OUTPATIENT
Start: 2019-09-24 | End: 2019-09-24 | Stop reason: SURG

## 2019-09-24 RX ORDER — SODIUM CHLORIDE, SODIUM LACTATE, POTASSIUM CHLORIDE, CALCIUM CHLORIDE 600; 310; 30; 20 MG/100ML; MG/100ML; MG/100ML; MG/100ML
INJECTION, SOLUTION INTRAVENOUS CONTINUOUS PRN
Status: DISCONTINUED | OUTPATIENT
Start: 2019-09-24 | End: 2019-09-24 | Stop reason: SURG

## 2019-09-24 RX ORDER — PROPOFOL 10 MG/ML
INJECTION, EMULSION INTRAVENOUS AS NEEDED
Status: DISCONTINUED | OUTPATIENT
Start: 2019-09-24 | End: 2019-09-24 | Stop reason: SURG

## 2019-09-24 RX ORDER — FENTANYL CITRATE 50 UG/ML
INJECTION, SOLUTION INTRAMUSCULAR; INTRAVENOUS AS NEEDED
Status: DISCONTINUED | OUTPATIENT
Start: 2019-09-24 | End: 2019-09-24 | Stop reason: SURG

## 2019-09-24 RX ORDER — PROPOFOL 10 MG/ML
INJECTION, EMULSION INTRAVENOUS CONTINUOUS PRN
Status: DISCONTINUED | OUTPATIENT
Start: 2019-09-24 | End: 2019-09-24 | Stop reason: SURG

## 2019-09-24 RX ORDER — LIDOCAINE HYDROCHLORIDE 10 MG/ML
5 INJECTION, SOLUTION EPIDURAL; INFILTRATION; INTRACAUDAL; PERINEURAL ONCE
Status: COMPLETED | OUTPATIENT
Start: 2019-09-24 | End: 2019-09-24

## 2019-09-24 RX ADMIN — PROPOFOL 200 MG: 10 INJECTION, EMULSION INTRAVENOUS at 10:57

## 2019-09-24 RX ADMIN — FENTANYL CITRATE 100 MCG: 50 INJECTION INTRAMUSCULAR; INTRAVENOUS at 11:06

## 2019-09-24 RX ADMIN — KETOROLAC TROMETHAMINE 30 MG: 30 INJECTION, SOLUTION INTRAMUSCULAR at 11:11

## 2019-09-24 RX ADMIN — DEXAMETHASONE SODIUM PHOSPHATE 4 MG: 10 INJECTION, SOLUTION INTRAMUSCULAR; INTRAVENOUS at 11:01

## 2019-09-24 RX ADMIN — PROPOFOL 100 MG: 10 INJECTION, EMULSION INTRAVENOUS at 11:07

## 2019-09-24 RX ADMIN — GLYCOPYRROLATE 0.1 MG: 0.2 INJECTION, SOLUTION INTRAMUSCULAR; INTRAVENOUS at 10:40

## 2019-09-24 RX ADMIN — FENTANYL CITRATE 50 MCG: 50 INJECTION INTRAMUSCULAR; INTRAVENOUS at 12:39

## 2019-09-24 RX ADMIN — FENTANYL CITRATE 50 MCG: 50 INJECTION INTRAMUSCULAR; INTRAVENOUS at 12:45

## 2019-09-24 RX ADMIN — OXYCODONE AND ACETAMINOPHEN 1 TABLET: 5; 325 TABLET ORAL at 14:10

## 2019-09-24 RX ADMIN — FENTANYL CITRATE 50 MCG: 50 INJECTION INTRAMUSCULAR; INTRAVENOUS at 12:32

## 2019-09-24 RX ADMIN — PROPOFOL 50 MG: 10 INJECTION, EMULSION INTRAVENOUS at 11:04

## 2019-09-24 RX ADMIN — PROPOFOL 150 MCG/KG/MIN: 10 INJECTION, EMULSION INTRAVENOUS at 11:09

## 2019-09-24 RX ADMIN — ONDANSETRON 4 MG: 2 INJECTION INTRAMUSCULAR; INTRAVENOUS at 11:01

## 2019-09-24 RX ADMIN — CEFAZOLIN SODIUM 2000 MG: 2 SOLUTION INTRAVENOUS at 10:40

## 2019-09-24 RX ADMIN — LIDOCAINE HYDROCHLORIDE 50 MG: 10 INJECTION, SOLUTION INFILTRATION; PERINEURAL at 10:57

## 2019-09-24 RX ADMIN — LIDOCAINE HYDROCHLORIDE 5 ML: 10 INJECTION, SOLUTION EPIDURAL; INFILTRATION; INTRACAUDAL; PERINEURAL at 10:05

## 2019-09-24 RX ADMIN — FENTANYL CITRATE 100 MCG: 50 INJECTION INTRAMUSCULAR; INTRAVENOUS at 10:40

## 2019-09-24 RX ADMIN — SODIUM CHLORIDE, SODIUM LACTATE, POTASSIUM CHLORIDE, AND CALCIUM CHLORIDE: .6; .31; .03; .02 INJECTION, SOLUTION INTRAVENOUS at 09:18

## 2019-09-24 NOTE — DISCHARGE INSTRUCTIONS
POST-OPERATIVE BREAST CARE INSTRUCTIONS    Wound Closure/Dressing: Your wound is closed with:   Steri strips-white pieces of tape that hold your incision together along with surgical glue           Dissolvable sutures-underneath the skin    Wound care:     If you have gauze over your wound you may remove it the day after surgery  Leave the Steri-strips on, they will fall off on their own in 1-2 weeks   You may shower using soap and water to clean your wound  Gently pat dry  Drain Care: If you do not have a drain, disregard this section   Visiting Nursing should have been arranged upon discharge from hospital   A nurse will call your home to schedule an initial visit  Please call the number below if you have not received a call within 48 hours of hospital discharge   You may shower with the KALLIE drains  Wash area around the drains with soap and water and pat dry   Record drain outputs on chart given to you at pre op visit and bring that chart to office at post op appt   KALLIE drains can be removed in the office by a nurse either at post op visit OR when drain output is 30 ccs or less in a 24 hour period for three days in a row   If you had plastic surgery or reconstructive surgery, the plastic surgeon will manage the drains  Other:       You can begin wearing your own bra when it feels comfortable   You may resume using deodorant the day after surgery                 Post-op visit:  your post-op visit is scheduled for:  2019 @ 11:00 AM    Call our office at [phone] if you have any  of the followin  Redness, swelling, heat, unusual drainage or heavy bleeding from wound  2  Fever greater than 101 °  3  Pain not relieved by medication

## 2019-09-24 NOTE — PROGRESS NOTES
Patient's heart rate occasionally dips to 38  Dr Bridgette Berkowitz informed and acknowledged    Patient may be discharged from PACU as per Dr Bridgette Berkowitz

## 2019-09-24 NOTE — OP NOTE
OPERATIVE REPORT  PATIENT NAME: Katerin Holder    :  1980  MRN: [id number]  Pt Location: AN OR ROOM 02    SURGERY DATE: 2019    Surgeon(s) and Role:     * Evelyn Resendiz MD - Primary     * Stella Rico PA-C - Assisting    Preop Diagnosis:  Malignant neoplasm of upper-outer quadrant of left breast in female, estrogen receptor positive ([facility] [address] ) [C50 412, Z17 0]    Post-Op Diagnosis Codes:     * Malignant ne[facility] of upper-outer quadrant of left [facility] in female, [facility] [C50 412, Z17 0]    Procedure(s) (LRB):  BREAST NEEDLE LOCALIZED LUMPECTOMY (NEEDLE LOC AT 0930) (Left)  SENTINEL LYMPH NODE BIOPSY; LYMPHATIC MAPPING WITH BLUE DYE AND RADIOACTIVE DYE (INJECT AT 1130 BY DR COLEMAN IN THE OR) (Left)    Specimen(s):  ID Type Source Tests Collected by Time Destination   1 : LEFT BREAST LUMPECTOMY  Tissue Breast, NOS TISSUE EXAM Evelyn Resendiz MD 2019 1136    2 : LEFT AXILLARY SENTINEL LYMPH NODE #1 Tissue Lymph Node, [place] TISSUE EXAM Evelyn Resendiz MD 2019 1137    3 : LEFT AXILLARY SENTINEL LYMPH NODE #2 & #3 Tissue Lymph Node, [place] TISSUE EXAM Evelyn Rseendiz MD 2019 1145    4 : LEFT BREAST LUMPECTOMY- LATERAL- RED Tissue Breast, Left TISSUE EXAM Evelyn Resendiz MD 2019 1158    5 : LEFT BREAST LUMPECTOMY- INFERIOR- BLUE Tissue Breast, Left TISSUE Jv Newman MD 2019 1148    6 : LEFT BREAST LUMPECTOMY- MEDIAL- YELLOW Tissue Breast, Left TISSUE Jv Newman MD 2019 1148    7 : LEFT BREAST LUMPECTOMY- SUPERIOR- ORANGE Tissue Breast, Left TISSUE Jv Newman MD 2019 1148        Estimated Blood Loss:   Minimal    Drains:  * No LDAs found *    Anesthesia Type:   General    Operative Indications:  Malignant neoplasm of upper-outer quadrant of left breast in female, estrogen receptor positive ([facility] [address] ) [C50 412, Z17 0]      Operative Findings:  Good specimen radiograph, 3 sln, # 1 hot (1500 cpm),not blue, #2 and #3 hot (ca 300 cpm) both strongly blue    All grossly normal, no gross adenopathy in axilla, None of the three sln had a clip  Complications:   None    Procedure and Technique:  Lumpectomy  The patient was brought to the operation room and placed under general anesthesia  Attention was paid to ensure appropriate padding and correct positioning  The left breast was injected intradermally with 0 3cc of methylene blue and technetium sulfur colloid  This area was vigorously massaged to facilitate identification of the sentinel lymph node (SLN)  The breast and axillary region were then prepped and draped in a sterile fashion  I initiated a time-out, identifying the patient, the correct side and the above procedure  All parties agreed with the time out  The planned incision was then injected with 0 25% Marcaine for local anesthesia  The incision was sharply incised  The localizing wire was used to guide the dissection  Superior, inferior, medial and lateral planes were developed around the localizing wire and the specimen truncated posteriorly with electrocautery just beyond the tip of the wire  The specimen was then inked for orientation purposes  A specimen radiograph was taken in two dimensions and additional margins were removed to optimize complete removal of the tumor  The additional margins were inked on the most distal area  Final anterior was skin, final posterior was muscle  All specimens were sent to pathology for permanent analysis  SLN Biopsy  The sentinel lymph node region was easily identified through the upper outer quadrant incision which we had used to remove the breast cancer  The [employer] counter was used to help localize the SLN  there were 3 sentinel lymph nodes identified all of them were grossly normal    The 1st sentinel lymph node was not blue but only radioactive (1500 CPM)  Underneath this were 2 blue sentinel lymph nodes whose counts were approximately 300 each  They were all sent for permanent pathologic analysis  Superficial bleeding controlled with electrocautery and the wound was extensively irrigated  The wound was closed with two layers, an inner layer of 3-0 vicryl and a subcuticular layer of 4-0 monocryl  Exofin and steri-strips were applied  A qualified resident physician was not available,  the physician assistant was used to facilitate retraction/dissection as well as the expedite closure          Patient Disposition:  PACU     SIGNATURE: Claudia Flores MD  DATE: September 24, 2019  TIME: 12:00 PM

## 2019-09-24 NOTE — INTERVAL H&P NOTE
H&P reviewed  After examining the patient I find no changes in the patients condition since the H&P had been written      Vitals:    09/24/19 0901   BP: 125/65   Pulse: 69   Resp: 18   Temp: 97 6 °F (36 4 °C)   SpO2: 98%

## 2019-09-24 NOTE — ANESTHESIA PREPROCEDURE EVALUATION
Review of Systems/Medical History  Patient summary reviewed  Chart reviewed  No history of anesthetic complications     Cardiovascular  Negative cardio ROS    Pulmonary  Negative pulmonary ROS        GI/Hepatic  Negative GI/hepatic ROS          Negative  ROS        Endo/Other  Negative endo/other ROS      GYN    Breast cancer        Hematology  Negative hematology ROS      Musculoskeletal  Negative musculoskeletal ROS        Neurology  Negative neurology ROS      Psychology   Depression ,              Physical Exam    Airway    Mallampati score: II  TM Distance: >3 FB  Neck ROM: full     Dental       Cardiovascular  Comment: Negative ROS,     Pulmonary      Other Findings        Anesthesia Plan  ASA Score- 2     Anesthesia Type- general with ASA Monitors  Additional Monitors:   Airway Plan: LMA  Plan Factors-    Induction- intravenous  Postoperative Plan-     Informed Consent- Anesthetic plan and risks discussed with patient  I personally reviewed this patient with the CRNA  Discussed and agreed on the Anesthesia Plan with the CRNA  Joann Ayoub

## 2019-10-01 RX ORDER — ESCITALOPRAM OXALATE 10 MG/1
TABLET ORAL
Qty: 30 TABLET | Refills: 0 | OUTPATIENT
Start: 2019-10-01

## 2019-10-02 ENCOUNTER — OFFICE VISIT (OUTPATIENT)
Dept: OBGYN CLINIC | Facility: CLINIC | Age: 39
End: 2019-10-02

## 2019-10-02 VITALS
WEIGHT: 174 LBS | HEIGHT: 71 IN | SYSTOLIC BLOOD PRESSURE: 110 MMHG | DIASTOLIC BLOOD PRESSURE: 60 MMHG | BODY MASS INDEX: 24.36 KG/M2

## 2019-10-02 DIAGNOSIS — Z01.419 ENCOUNTER FOR ANNUAL ROUTINE GYNECOLOGICAL EXAMINATION: Primary | ICD-10-CM

## 2019-10-02 PROCEDURE — 99024 POSTOP FOLLOW-UP VISIT: CPT | Performed by: OBSTETRICS & GYNECOLOGY

## 2019-10-02 RX ORDER — PHENOL 1.4 %
1 AEROSOL, SPRAY (ML) MUCOUS MEMBRANE DAILY
COMMUNITY

## 2019-10-02 NOTE — PROGRESS NOTES
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, estrogen receptor positive S/P lumpectomy - scheduled for RT  Plan:  1  Patient educated on healthy diet and exercise  2  Patient educated on Kegel exercises   3  Follow up with Oncology    Subjective:     Patient ID: Tamara Costa is a  37 y o  female presenting for her 6 week post-partum visit  Patient has a history of breast cancer  She is not having any bladder, vaginal, or bowel issues  Patient has a post partum depression scale of 23, she is followed by psych, no suicidal thoughts  Patient is on Lexapro 10mg, followed by psych for depression related to breast cancer  Review of Systems   Genitourinary: Negative  Objective:     Physical Exam   Cardiovascular: Normal rate, regular rhythm and normal heart sounds  Pulmonary/Chest: Effort normal and breath sounds normal    Abdominal: Hernia confirmed negative in the right inguinal area and confirmed negative in the left inguinal area  Genitourinary: Vagina normal and uterus normal  No labial fusion  There is no rash, tenderness, lesion or injury on the right labia  There is no rash, tenderness, lesion or injury on the left labia  Cervix exhibits no motion tenderness, no discharge and no friability  Right adnexum displays no mass, no tenderness and no fullness  Left adnexum displays no mass, no tenderness and no fullness  Genitourinary Comments: Mild bright red vaginal bleeding due to menses  Good tone of pelvic muscles with Kegel  Lymphadenopathy: No inguinal adenopathy noted on the right or left side

## 2019-10-02 NOTE — PROGRESS NOTES
The patient is here for a 6 week post-partum  The patient delivered on 8/17/19  The patient is not due for a pap smear  The patient has no bleeding or cramping  No vaginal or urinary issues

## 2019-10-04 ENCOUNTER — TELEPHONE (OUTPATIENT)
Dept: SURGICAL ONCOLOGY | Facility: CLINIC | Age: 39
End: 2019-10-04

## 2019-10-04 NOTE — TELEPHONE ENCOUNTER
I spoke with the patient and conveyed that her margins were negative and her lymph nodes were negative  We will see her on Monday for her postoperative visit  She was appreciative of the phone call  All questions were answered the patient's satisfaction

## 2019-10-07 ENCOUNTER — TELEPHONE (OUTPATIENT)
Dept: HEMATOLOGY ONCOLOGY | Facility: CLINIC | Age: 39
End: 2019-10-07

## 2019-10-07 ENCOUNTER — OFFICE VISIT (OUTPATIENT)
Dept: SURGICAL ONCOLOGY | Facility: CLINIC | Age: 39
End: 2019-10-07

## 2019-10-07 VITALS
SYSTOLIC BLOOD PRESSURE: 132 MMHG | HEART RATE: 80 BPM | BODY MASS INDEX: 25.34 KG/M2 | RESPIRATION RATE: 16 BRPM | DIASTOLIC BLOOD PRESSURE: 80 MMHG | WEIGHT: 177 LBS | TEMPERATURE: 97.8 F | HEIGHT: 70 IN

## 2019-10-07 DIAGNOSIS — Z17.0 MALIGNANT NEOPLASM OF UPPER-OUTER QUADRANT OF LEFT BREAST IN FEMALE, ESTROGEN RECEPTOR POSITIVE (HCC): Primary | ICD-10-CM

## 2019-10-07 DIAGNOSIS — C50.412 MALIGNANT NEOPLASM OF UPPER-OUTER QUADRANT OF LEFT BREAST IN FEMALE, ESTROGEN RECEPTOR POSITIVE (HCC): Primary | ICD-10-CM

## 2019-10-07 DIAGNOSIS — Z98.890 STATUS POST LEFT BREAST LUMPECTOMY: ICD-10-CM

## 2019-10-07 PROCEDURE — 99024 POSTOP FOLLOW-UP VISIT: CPT | Performed by: SURGERY

## 2019-10-07 NOTE — PROGRESS NOTES
Surgical Oncology Breast Post-Op       [address] [facility]  [facility]  [address] [facility] [address]  [place] [address]    Adeel Smith  1980  [id number]  [address]  [facility] [place]  [address] [place] [address]    Chief Complaint:   Claudis Moritz is seen for a post-operative visit of her recent breast surgery  Oncology History:        Malignant neoplasm of upper-outer quadrant of left breast in female, estrogen receptor positive ([facility] [address] )    8/13/2019 Biopsy     Left breast ultrasound-guided biopsy  A  2 o'clock, 14 cm from nipple  Invasive mammary carcinoma of no special type (ductal, not otherwise specified)  Grade 2  ER 90  DC 60  HER2 1+    B  Left axillary lymph node  Portion of lymph node, negative for carcinoma      8/16/2019 Genetic Testing     The following genes were evaluated: SUDHA, BRCA1, BRCA2, CDH1, CHEK2, PALB2, PTEN, STK11, TP53  Negative result  No pathogenic sequence variants or deletions/dupllications identified  [employer]      9/24/2019 Surgery     Left breast needle localized lumpectomy with sentinel lymph node biopsy  Invasive breast carcinoma of no special type (ductal NST)  Grade 2  1 9 cm  Margins negative  0/3 Lymph nodes  Anatomic/Prognostic Stage IA         Assessment & Plan:   Assessment/Plan    The patient presents for follow-up visit  She complains of some pain and paresthesias in the right breast   I recommended she use Aleve as oppose to narcotics  Clinical exam shows no evidence of a hematoma seroma or infection  Her Steri-Strips and glue were still relatively adherent  I recommended Radiation Oncology as well as Medical Oncology consult    I explained she the medical oncologist may order a tested determine the utility of chemotherapy though I am not certain so I would not ordered the test   I did give her some information regarding the mammo print test   Her pathology demonstrated clean margins and node negativity  We will see her back in 3 months  History of Present Illness:   See Oncology History    Interval History:   See Assessment & Plan    Review of Systems:   Review of Systems   All other systems reviewed and are negative  Past Medical History:     Patient Active Problem List   Diagnosis    AMA (advanced maternal age) multigravida 33+    Previous  labor affecting pregnancy, antepartum    39 weeks gestation of pregnancy    Positive GBS test    Malignant neoplasm of upper-outer quadrant of left breast in female, estrogen receptor positive ([facility] [address] )    Forceps delivery with baby delivered     Past Medical History:   Diagnosis Date    Abnormal Pap smear of cervix     BRCA gene mutation negative 2019    [employer]    HPV (human papilloma virus) infection     Papanicolaou smear 2017    Postpartum depression     As per patient's spouse "it got severe"    Primary breast malignancy, left ([facility] [address] ) 2019    Rh negative state in antepartum period     Varicella     Vaccinated     Past Surgical History:   Procedure Laterality Date    BREAST LUMPECTOMY Left 2019    Procedure: BREAST NEEDLE LOCALIZED LUMPECTOMY (NEEDLE LOC AT 0930); Surgeon: Asha Sanabria MD;  [facility];  Service: Surgical Oncology    COLPOSCOPY      LYMPH NODE BIOPSY Left 2019    Procedure: SENTINEL LYMPH NODE BIOPSY; LYMPHATIC MAPPING WITH BLUE DYE AND RADIOACTIVE DYE (INJECT AT 1130 BY DR COLEMAN IN THE OR);   Surgeon: Asha Sanabria MD;  [facility];  Service: Surgical Oncology    MAMMO NEEDLE LOCALIZATION LEFT (ALL INC) Left 2019    US GUIDED BREAST BIOPSY LEFT COMPLETE Left 2019    US GUIDED BREAST LYMPH NODE BIOPSY LEFT Left 2019     Family History   Problem Relation Age of Onset    Stomach cancer Mother 37    Diabetes Maternal Grandmother     No Known Problems Father     No Known Problems Sister      Social History     Socioeconomic History    Marital status: /Civil [employer]     Spouse name: Not on file    Number of children: Not on file    Years of education: Not on file    Highest education level: Not on file   Occupational History    Not on file   Social Needs    Financial resource strain: Not on file    Food insecurity:     Worry: Not on file     Inability: Not on file    Transportation needs:     Medical: Not on file     Non-medical: Not on file   Tobacco Use    Smoking status: Never Smoker    Smokeless tobacco: Never Used   Substance and Sexual Activity    Alcohol use: Yes     Frequency: 2-4 times a month     Drinks per session: 1 or 2    Drug use: No    Sexual activity: Not Currently   Lifestyle    Physical activity:     Days per week: Not on file     Minutes per session: Not on file    Stress: Not on file   Relationships    Social connections:     Talks on phone: Not on file     Gets together: Not on file     Attends Jainism service: Not on file     Active member of club or organization: Not on file     Attends meetings of clubs or organizations: Not on file     Relationship status: Not on file    Intimate partner violence:     Fear of current or ex partner: Not on file     Emotionally abused: Not on file     Physically abused: Not on file     Forced sexual activity: Not on file   Other Topics Concern    Not on file   Social History Narrative    Not on file       Current Outpatient Medications:     escitalopram (LEXAPRO) 10 mg tablet, Take 1 tablet (10 mg total) by mouth daily, Disp: 30 tablet, Rfl: 0    Multiple Vitamins-Minerals (MULTIVITAMIN WOMEN) TABS, Take 1 tablet by mouth daily, Disp: , Rfl:     Pyridoxine HCl (VITAMIN B-6) 25 MG tablet, Take by mouth, Disp: , Rfl:     EPINEPHrine (EPIPEN) 0 3 mg/0 3 mL SOAJ, Inject 0 3 mL (0 3 mg total) into a muscle once for 1 dose, Disp: 0 6 mL, Rfl: 0    oxyCODONE-acetaminophen (PERCOCET) 5-325 mg per tablet, Take 1 tablet by mouth every 6 (six) hours as needed for moderate painMax Daily Amount: 4 tablets (Patient not taking: Reported on 10/7/2019), Disp: 6 tablet, Rfl: 0  Allergies   Allergen Reactions    Bee Venom        Physical Exams:     Vitals:    10/07/19 1123   BP: 132/80   Pulse: 80   Resp: 16   Temp: 97 8 °F (36 6 °C)     Physical Exam   Pulmonary/Chest:   Examination of the left breast demonstrates a well-healed incision with no evidence of infection hematoma or seroma  Her glue and Steri-Strips are still strongly adherent  I have recommended the patient keep them on until they are easier to remove  Results & Discussion:   I reviewed her pathology  He I provided her a copy of the report  I talked to her about the need for radiation therapy as well as anti hormonal treatment  The role of chemotherapy is less clear to me I have recommended she wait until she sees Dr Robert Jackson for an opinion regarding further discussions

## 2019-10-09 ENCOUNTER — TELEPHONE (OUTPATIENT)
Dept: HEMATOLOGY ONCOLOGY | Facility: CLINIC | Age: 39
End: 2019-10-09

## 2019-10-09 NOTE — TELEPHONE ENCOUNTER
New Patient Encounter    New Patient Intake Form   Patient Details:  Magda Suh  1980  [id number]    Background Information:  [address] starts by opening a telephone encounter and gathering the following information   Who is calling to schedule? If not self, relationship to patient? Daisy   Referring Provider Nancy Kee   What is the diagnosis? Breast ca   When was the diagnosis? *9/24/2019   Is patient aware of diagnosis? Yes   Reason for visit? NP DX   Have you had any testing done? If so: when, where? Yes   Are records in [employer]? yes   Was the patient told to bring a disk? no   Scheduling Information:   Preferred [place]: [place]     Requesting Specific Provider? Melanie Richardson   Are there any dates/time the patient cannot be seen? As soon as possible   Counseling Pre-Screen:  If the patient answers YES to any of the below questions, please route to the appropriate location specific counselor    Have you felt anxious or worried about cancer and the treatment you are receiving? Yes, Pt worried that she will not get in on time to see [facility] b4 her sister leaves town  Her sister will be helping with the kids during her treatment   Has your diagnosis caused physical, emotional, or financial hardship for you? no   Note: Do not ask the patient about transportation issues/needs  Please notate if the patient brings it up and the counselor will schedule accordingly  Miscellaneous: PATIENT SCHEDULED FOR NOV 14, BUT SOONER APPT  WAS REQUESTED  WAITING ON DR Nara Gomez TEAM   After completing the above information, please route to Financial Counselor and the appropriate Nurse Navigator for review

## 2019-10-10 ENCOUNTER — RADIATION ONCOLOGY CONSULT (OUTPATIENT)
Dept: RADIATION ONCOLOGY | Facility: HOSPITAL | Age: 39
End: 2019-10-10
Attending: STUDENT IN AN ORGANIZED HEALTH CARE EDUCATION/TRAINING PROGRAM
Payer: COMMERCIAL

## 2019-10-10 ENCOUNTER — TELEPHONE (OUTPATIENT)
Dept: SURGICAL ONCOLOGY | Facility: CLINIC | Age: 39
End: 2019-10-10

## 2019-10-10 ENCOUNTER — CLINICAL SUPPORT (OUTPATIENT)
Dept: RADIATION ONCOLOGY | Facility: HOSPITAL | Age: 39
End: 2019-10-10
Payer: COMMERCIAL

## 2019-10-10 VITALS
HEART RATE: 74 BPM | DIASTOLIC BLOOD PRESSURE: 68 MMHG | BODY MASS INDEX: 25.4 KG/M2 | SYSTOLIC BLOOD PRESSURE: 108 MMHG | WEIGHT: 177 LBS | RESPIRATION RATE: 18 BRPM | OXYGEN SATURATION: 98 % | TEMPERATURE: 97.8 F

## 2019-10-10 DIAGNOSIS — Z17.0 MALIGNANT NEOPLASM OF UPPER-OUTER QUADRANT OF LEFT BREAST IN FEMALE, ESTROGEN RECEPTOR POSITIVE (HCC): Primary | ICD-10-CM

## 2019-10-10 DIAGNOSIS — C50.412 MALIGNANT NEOPLASM OF UPPER-OUTER QUADRANT OF LEFT BREAST IN FEMALE, ESTROGEN RECEPTOR POSITIVE (HCC): Primary | ICD-10-CM

## 2019-10-10 PROCEDURE — 99211 OFF/OP EST MAY X REQ PHY/QHP: CPT | Performed by: STUDENT IN AN ORGANIZED HEALTH CARE EDUCATION/TRAINING PROGRAM

## 2019-10-10 NOTE — PROGRESS NOTES
Consultation - Radiation Oncology     AOB:[id number] : 1980  Encounter: [id number]  Patient Information: Mary Medina      CHIEF COMPLAINT  Chief Complaint   Patient presents with    Consult     Radiation Oncology     Cancer Staging  Malignant neoplasm of upper-outer quadrant of left breast in female, estrogen receptor positive ([facility] [place] [address] )  Staging form: Breast, AJCC 8th Edition  - Pathologic: Stage IA (pT1c, pN0(sn), cM0, G2, ER+, CT+, HER2-) - Unsigned  Neoadjuvant therapy: No  Laterality: Left  Tumor size (mm): 19  Method of lymph node assessment: [place] lymph node biopsy  Histologic grading system: 3 grade system           History of Present Illness   Mary Medina is a 37y o  year old female who presents presents today for radiation consult for cancer of the left breast, referred by Dr Shelby Esquivel      37year old female palpated a lump in her left breast, brought it to the attention of her OB-GYN at a routine prenatal visit on 19  A 2-3 cm firm, mobile mass was palpated at 1:00 left breast  She was sent for US and mammogram          19 US breast left limited diagnostic  FINDINGS:   LEFT  1) MASS: There is an 18 mm x 18 mm x 16 mm irregularly shaped, non-parallel, hypoechoic mass with spiculated margins with shadowing seen in the left breast at 2 o'clock, 14 cm from the nipple  The mass correlates with the palpable mass reported by the patient       On review of the left axilla there are no lymph nodes with suspicious morphology   However on review of color images there appears to be a small amount of questionable cortical blood flow present within a node on image time stamped 9:01:24 AM   This area of cortical flow was discovered on 2nd review of images following discussion with the patient and therefore was not included in the biopsy discussion   Recommend repeat evaluation of the axilla at time of biopsy to evaluate if this is a true finding as I was not able to document in real-time        IMPRESSION:  ASSESSMENT/BI-RADS CATEGORY:  Left: 5 - Highly Suggestive of Malignancy  Overall: 5 - Highly Suggestive of Malignancy     8/13/19 Mammo diagnostic bilateral w 3d & cad  FINDINGS:   LEFT  1) MASS  Mammo diagnostic bilateral w 3d & cad: There is a 48 mm x 18 mm x 16 mm irregularly shaped mass with spiculated margins seen in the upper outer quadrant of the left breast at 2 o'clock in the posterior depth, 14 cm from the nipple on the MLO and CC views  US breast bilateral limited (diagnostic): There is a 25 mm x 22 mm x 6 mm irregularly shaped, non-parallel, hypoechoic mass with spiculated margins with shadowing seen in the left breast at 2 o'clock, 14 cm from the nipple  Associated features include internal vascularity  2) LYMPH NODE  Mammo diagnostic bilateral w 3d & cad: There are no corresponding lymph nodes seen on this modality  US breast bilateral limited (diagnostic): There is a lymph node seen in the left axilla  The visualized left axillary lymph nodes appear normal in size   However, the cortex of 1 particular lymph node appears very minimally prominent and for this reason ultrasound-guided core biopsy will be performed      There was a questionable area of architectural distortion was seen in the slightly outer left breast, mid breast depth, however, no corresponding sonographic finding is present      RIGHT  Questionable outer posterior area of asymmetry is seen, again with no corresponding sonographic finding most likely representing lactational change   There are no suspicious masses, grouped microcalcifications or areas of architectural distortion  The skin and nipple areolar complex are unremarkable        IMPRESSION:  1  Upper outer posterior left breast specialists mass which ultrasound-guided core biopsy is recommended  2  Normal size left axillary lymph node but with questionably minimally prominent cortex which also will be sampled    3  Unremarkable mammogram however, the dense breast tissue can limit sensitivity       ASSESSMENT/BI-RADS CATEGORY:  Left: 5 - Highly Suggestive of Malignancy  Right: 1 - Negative  Overall: 5 - Highly Suggestive of Malignancy        8/16/19 Dr Angelique Dc, Surg Onc Consult  Pt is 44 weeks pregnant, scheduled for labor induction on 8/17/19  Mammogram demonstrated mass on the left breast at the 2 o'clock position 14 cm from nipple, measuring approximately 4 8 cm on mammo, but approx 2 5 cm on US  This was biopsied along with a lymph node  The lymph node was negative  However breast biopsy was positive for invasive ductal carcinoma, grade 2, ER 90%, AR 60%, HER2 negative  No significant family history of breast cancer  Radiologist recommend MRI in 4 weeks, which seems relatively long, recommend ABUS and genetic testing now          9/24/19 Dr Angelique Dc - Left breast needle localized lumpectomy with sentinel lymph node biopsy:  Invasive breast carcinoma of no special type (Ductal NST) Grade 2, 1 9 cm, Negative Margins, 0/3 lymph nodes  Stage 1A     10/7/19 Dr Angleique Dc, post-op follow-up  Pt c/o some pain and paresthesias in the right breast  Recommended using aleve  Clinical exam shows no evidence of hematoma, seroma or infection  Steri-strips and glue adherent  Reviewed pathology  Discussed need for radiation therapy and antihormonal therapy  Refer to Radiation and Medical oncology       11/14/19 Med Onc, Dr Minnie Burgos consult (plan to move this up sooner)  1/14/2020 Surg Onc follow-up     Historical Information      Malignant neoplasm of upper-outer quadrant of left breast in female, estrogen receptor positive ([facility] [address] )    8/2019 Initial Diagnosis     Invasive breast carcinoma, left breast      8/13/2019 Biopsy     Left breast ultrasound-guided biopsy  A  2 o'clock, 14 cm from nipple  Invasive mammary carcinoma of no special type (ductal, not otherwise specified)  Grade 2  ER 90  AR 60  HER2 1+    B   Left axillary lymph node  Portion of lymph node, negative for carcinoma      8/16/2019 Genetic Testing     The following genes were evaluated: SUDHA, BRCA1, BRCA2, CDH1, CHEK2, PALB2, PTEN, STK11, TP53  Negative result  No pathogenic sequence variants or deletions/dupllications identified  [employer]      9/24/2019 Surgery     Left breast needle localized lumpectomy with sentinel lymph node biopsy  Invasive breast carcinoma of no special type (ductal NST)  Grade 2  1 9 cm  Margins negative  0/3 Lymph nodes  Anatomic/Prognostic Stage IA           Past Medical History:   Diagnosis Date    Abnormal Pap smear of cervix     BRCA gene mutation negative 08/16/2019    [employer]    HPV (human papilloma virus) infection     Papanicolaou smear 12/28/2017    Postpartum depression     As per patient's spouse "it got severe"    Primary breast malignancy, left ([address] ) 08/14/2019    Rh negative state in antepartum period     Varicella     Vaccinated     Past Surgical History:   Procedure Laterality Date    BREAST LUMPECTOMY Left 9/24/2019    Procedure: BREAST NEEDLE LOCALIZED LUMPECTOMY (NEEDLE LOC AT 0930); Surgeon: Surendra Morrell MD;  [facility];  Service: Surgical Oncology    COLPOSCOPY      LYMPH NODE BIOPSY Left 9/24/2019    Procedure: SENTINEL LYMPH NODE BIOPSY; LYMPHATIC MAPPING WITH BLUE DYE AND RADIOACTIVE DYE (INJECT AT 1130 BY DR COLEMAN IN THE OR);   Surgeon: Surendra Morrell MD;  [facility];  Service: Surgical Oncology    MAMMO NEEDLE LOCALIZATION LEFT (ALL INC) Left 9/24/2019    US GUIDED BREAST BIOPSY LEFT COMPLETE Left 8/13/2019    US GUIDED BREAST LYMPH NODE BIOPSY LEFT Left 8/13/2019       Family History   Problem Relation Age of Onset    Stomach cancer Mother 37    Diabetes Maternal Grandmother     No Known Problems Father     No Known Problems Sister        Social History   Social History     Substance and Sexual Activity   Alcohol Use Yes    Frequency: 2-4 times a month    Drinks per session: 1 or 2     Social History     Substance and Sexual Activity Drug Use No     Social History     Tobacco Use   Smoking Status Former Smoker   Smokeless Tobacco Never Used   Tobacco Comment    only social smoking many years ago         Meds/Allergies     Current Outpatient Medications:     EPINEPHrine (EPIPEN) 0 3 mg/0 3 mL SOAJ, Inject 0 3 mL (0 3 mg total) into a muscle once for 1 dose, Disp: 0 6 mL, Rfl: 0    escitalopram (LEXAPRO) 10 mg tablet, Take 1 tablet (10 mg total) by mouth daily, Disp: 30 tablet, Rfl: 0    Multiple Vitamins-Minerals (MULTIVITAMIN WOMEN) TABS, Take 1 tablet by mouth daily, Disp: , Rfl:     Pyridoxine HCl (VITAMIN B-6) 25 MG tablet, Take by mouth, Disp: , Rfl:     oxyCODONE-acetaminophen (PERCOCET) 5-325 mg per tablet, Take 1 tablet by mouth every 6 (six) hours as needed for moderate painMax Daily Amount: 4 tablets (Patient not taking: Reported on 10/7/2019), Disp: 6 tablet, Rfl: 0  Allergies   Allergen Reactions    Bee Venom      Clinical Trial: No     OB/GYN History:  The patient underwent menarche at 9 years  Menopause Status pre  No LMP recorded (lmp unknown)  - LMP 2 years ago prior to first pregnancy  Hormone replacement therapy: no      2   Para 2   Age at first delivery being 34 years  Nursing: not currently, breast fed first baby for 6 months   Birth control pills: yes, Years used less than one year    Review of Systems   Constitutional: Positive for fatigue  HENT: Negative  Eyes: Negative  Respiratory: Negative  Cardiovascular: Negative  Gastrointestinal: Negative  Endocrine: Negative  Genitourinary: Negative  Musculoskeletal: Negative  Skin:        Healing lumpectomy site, mild tenderness at left axilla   Allergic/Immunologic: Negative  Neurological: Negative  Hematological: Negative  Psychiatric/Behavioral: The patient is nervous/anxious (anxious/sad since diagnosis, started on lexapro)        OBJECTIVE:   /68 (BP Location: Right arm)   Pulse 74   Temp 97 8 °F (36 6 °C) (Temporal) Resp 18   Wt 80 3 kg (177 lb)   LMP  (LMP Unknown)   SpO2 98%   BMI 25 40 kg/m²   Pain Assessment:  0  Performance Status: ECOG/Zubrod/WHO: 1 - Symptomatic but completely ambulatory    Physical Exam GENERAL:  Appears stated age, in no apparent distress  Alert and oriented  HEENT:  Normocephalic, atraumatic   extraocular muscles intact  Oral mucosa moist   LYMPHATICS:  No cervical, supraclavicular, or infraclavicular lymphadenopathy noted bilaterally  PULMONARY:  Respirations unlabored  CARDIOVASCULAR:  Regular rate  ABDOMEN:  Soft, nondistended  NEUROLOGIC: Moving all extremities, No focal deficits noted  EXTREMITIES: no clubbing, cyanosis, or edema  PSYCHIATRIC: normal mood and affect  Appropriate thought content and judgement  BREASTS:  Patient examined in the seated position today  No gross asymmetry noted  Left breast with incision site healing well, with mild seroma palpable  Otherwise and no palpable lesions in bilateral breasts or axilla         RESULTS  Lab Results    Chemistry        Component Value Date/Time    K 4 3 09/15/2019 1116     (H) 09/15/2019 1116    CO2 25 09/15/2019 1116    BUN 16 09/15/2019 1116    CREATININE 0 92 09/15/2019 1116        Component Value Date/Time    CALCIUM 8 7 09/15/2019 1116    ALKPHOS 80 09/15/2019 1116    AST 15 09/15/2019 1116    ALT 25 09/15/2019 1116            Lab Results   Component Value Date    WBC 5 91 09/15/2019    HGB 13 9 09/15/2019    HCT 42 6 09/15/2019    MCV 92 09/15/2019     09/15/2019         Imaging Studies  Xr Chest Pa & Lateral    Result Date: 9/20/2019  Narrative: CHEST INDICATION:   C50 412: Malignant neoplasm of upper-outer quadrant of left female breast Z17 0: Estrogen receptor positive status (ER+)  Preprocedural assessment COMPARISON:  None EXAM PERFORMED/VIEWS:  XR CHEST PA & LATERAL FINDINGS: Cardiomediastinal silhouette appears unremarkable  The lungs are clear  No pneumothorax or pleural effusion   Osseous structures appear within normal limits for patient age  Impression: No acute cardiopulmonary disease  Workstation performed: [id number]     Mammo Needle Localization Left (all Inc)    Result Date: 9/24/2019  Narrative: MAMMOGRAPHY NEEDLE LOCALIZATION Indication: Left breast carcinoma  Localize stereotactic clip preoperatively Comparison: Prior imaging studies of the left breast Procedure: Prior to commencing the procedure, CC and ML views of the left breast were obtained for localization purposes only  The clip is seen surrounded by a spiculated mass in the upper outer quadrant  Informed consent was obtained prior to the procedure, discussing possible complications such as bleeding, infection, or allergic reaction  After verifying patient and side of interest and initialing side of concern (time out procedure), and utilizing digital mammographic guidance and sterile technique, a needle localization procedure of the stereotactic clip in the upper outer quadrant was performed from a lateral approach  The localizing  wire was positioned adjacent to the targeted stereotactic clip  A radiopaque skin marker was placed at the wire entry site  The patient tolerated the procedure well, leaving the department in satisfactory condition, with  guidance images available on PACS  Impression: Impression: Successful needle localization procedure of the targeted stereotactic clip  Workstation performed: [id number]     Nm Sentinal Node Inj    Result Date: 9/30/2019  Narrative: SENTINEL NODE LYMPHOSCINTIGRAPHY INDICATION:  C50 412: Malignant neoplasm of upper-outer quadrant of left female breast Z17 0: Estrogen receptor positive status (ER+)  FINDINGS: 0 5 mCi Tc-99m sulfur colloid (0 6 cc volume) was administered intradermally into the left breast by ABRAHAM COLEMAN in the OR  No imaging was performed  Impression: Injection of  0 5 mCi Tc-99m sulfur colloid into the left breast in the OR   Workstation performed: [id number] Mammo Breast Specimen Left (no Charge)    Result Date: 9/24/2019  Narrative: MAMMOGRAPHY BREAST SPECIMEN INDICATION: Evaluation of left breast surgical specimen TECHNIQUE: 2 digital images submitted; images marked with respect to specimen margins  FINDINGS: The specimen includes the localizing wire  The stereotactic clip  in question is included in the specimen  An additional submitted soft tissue specimen is labeled sentinel node     Impression: The breast specimen includes the localizing wire and targeted stereotactic clip   Workstation performed: [id number]         Pathology:invasive breast carcinoma of NST, 19mm, 0/3 SLN        ASSESSMENT  1  Malignant neoplasm of upper-outer quadrant of left breast in female, estrogen receptor positive ([facility] [address] )       Cancer Staging  Malignant neoplasm of upper-outer quadrant of left breast in female, estrogen receptor positive ([facility] [address] )  Staging form: Breast, AJCC 8th Edition  - Pathologic: Stage IA (pT1c, pN0(sn), cM0, G2, ER+, MI+, HER2-) - Unsigned  Neoadjuvant therapy: No  Laterality: Left  Tumor size (mm): 19  Method of lymph node assessment: [place] lymph node biopsy  Histologic grading system: 3 grade system        PLAN/DISCUSSION  No orders of the defined types were placed in this encounter  Pascale King is a 37y o  year old female with [id number] invasive mammary carcinoma of the left breast, ER/MI+, Her2-, status post lumpectomy +SLNbx, with pathology showing 19mm tumor with DCIS-5mm, negative margins    Patient is pending consultation with Medical oncology, Dr Terrence Santos at this time  We discussed that if chemotherapy is recommended, it would be first and radiation would begin approximately 4 weeks after  We discussed external beam breast radiation therapy in the setting of breast conservation    Adjuvant radiation to the whole breast has been shown to reduce the risk of local recurrence by greater than 60%, and improves overall survival   We discussed options for standard fractionation versus hypofractionation, and explained that while I standardly recommend hypofractionation, if at the time of planning, constraints cannot be satisfied, we may have to switch to a standard fractionation plan  We discussed options for boost to lumpectomy cavity with margin, which I explained does reduced the risk of local recurrence, with most benefit seen in younger patients and those with high grade disease  We discussed the mechanism of action of irradiation, logistics of treatment including CT simulation and treatment with deep inspiration breath hold to reduce cardiovascular risk, and side effects including but not limited to fatigue, erythema, desquamation of skin, weakened bone in treatment field, pneumonitis, cardiovascular risks, and secondary malignancy  After this discussion, we agreed to the following:    PLAN:  Case discussed with Dr Vanita Verma who will look into patient's chart to determine if he would like mammaprint testing and possibly move up appointment for consultation  We discussed that patient would need to be referred back once systemic therapy recommendations are determined  Radiation recommendations are for Whole breast external beam radiation with 40 05 Gy in 15 fractions, with 10 Gy boost in 5 fractions to follow  All of the patient's  questions were answered to her apparent satisfaction  Thank you for allowing us to participate in the care of  Mrs Myrna Orantes  Dariel Estrella MD  10/10/2019,10:35 AM      Portions of the record may have been created with voice recognition software   Occasional wrong word or "sound a like" substitutions may have occurred due to the inherent limitations of voice recognition software   Read the chart carefully and recognize, using context, where substitutions have occurred

## 2019-10-10 NOTE — PROGRESS NOTES
Donna Freeman 1980 is a 37 y o  female    Oncology History    Patient presents today for radiation consult for cancer of the left breast, referred by Dr Lulu Jose  37year old female palpated a lump in her left breast, brought it to the attention of her OB-GYN at a routine prenatal visit on 6/28/19  A 2-3 cm firm, mobile mass was palpated at 1:00 left breast  She was sent for US and mammogram        8/12/19 US breast left limited diagnostic  FINDINGS:   LEFT  1) MASS: There is an 18 mm x 18 mm x 16 mm irregularly shaped, non-parallel, hypoechoic mass with spiculated margins with shadowing seen in the left breast at 2 o'clock, 14 cm from the nipple  The mass correlates with the palpable mass reported by the patient       On review of the left axilla there are no lymph nodes with suspicious morphology  However on review of color images there appears to be a small amount of questionable cortical blood flow present within a node on image time stamped 9:01:24 AM   This area of cortical flow was discovered on 2nd review of images following discussion with the patient and therefore was not included in the biopsy discussion  Recommend repeat evaluation of the axilla at time of biopsy to evaluate if this is a true finding as I was not able to document in real-time  IMPRESSION:  ASSESSMENT/BI-RADS CATEGORY:  Left: 5 - Highly Suggestive of Malignancy  Overall: 5 - Highly Suggestive of Malignancy    8/13/19 Mammo diagnostic bilateral w 3d & cad  FINDINGS:   LEFT  1) MASS  Mammo diagnostic bilateral w 3d & cad: There is a 48 mm x 18 mm x 16 mm irregularly shaped mass with spiculated margins seen in the upper outer quadrant of the left breast at 2 o'clock in the posterior depth, 14 cm from the nipple on the MLO and CC views  US breast bilateral limited (diagnostic):  There is a 25 mm x 22 mm x 6 mm irregularly shaped, non-parallel, hypoechoic mass with spiculated margins with shadowing seen in the left breast at 2 o'clock, 14 cm from the nipple  Associated features include internal vascularity  2) LYMPH NODE  Mammo diagnostic bilateral w 3d & cad: There are no corresponding lymph nodes seen on this modality  US breast bilateral limited (diagnostic): There is a lymph node seen in the left axilla  The visualized left axillary lymph nodes appear normal in size  However, the cortex of 1 particular lymph node appears very minimally prominent and for this reason ultrasound-guided core biopsy will be performed      There was a questionable area of architectural distortion was seen in the slightly outer left breast, mid breast depth, however, no corresponding sonographic finding is present      RIGHT  Questionable outer posterior area of asymmetry is seen, again with no corresponding sonographic finding most likely representing lactational change  There are no suspicious masses, grouped microcalcifications or areas of architectural distortion  The skin and nipple areolar complex are unremarkable        IMPRESSION:  1  Upper outer posterior left breast specialists mass which ultrasound-guided core biopsy is recommended  2  Normal size left axillary lymph node but with questionably minimally prominent cortex which also will be sampled  3  Unremarkable mammogram however, the dense breast tissue can limit sensitivity       ASSESSMENT/BI-RADS CATEGORY:  Left: 5 - Highly Suggestive of Malignancy  Right: 1 - Negative  Overall: 5 - Highly Suggestive of Malignancy       8/16/19 Dr Sameera Jorge, Surg Onc Consult  Pt is 44 weeks pregnant, scheduled for labor induction on 8/17/19  Mammogram demonstrated mass on the left breast at the 2 o'clock position 14 cm from nipple, measuring approximately 4 8 cm on mammo, but approx 2 5 cm on US  This was biopsied along with a lymph node  The lymph node was negative  However breast biopsy was positive for invasive ductal carcinoma, grade 2, ER 90%, NC 60%, HER2 negative     No significant family history of breast cancer  Radiologist recommend MRI in 4 weeks, which seems relatively long, recommend ABUS and genetic testing now  9/24/19 Dr Britta Prather - Left breast needle localized lumpectomy with sentinel lymph node biopsy:  Invasive breast carcinoma of no special type (Ductal NST) Grade 2, 1 9 cm, Negative Margins, 0/3 lymph nodes  Stage 1A    10/7/19 Dr Britta Prather, post-op follow-up  Pt c/o some pain and paresthesias in the right breast  Recommended using aleve  Clinical exam shows no evidence of hematoma, seroma or infection  Steri-strips and glue adherent  Reviewed pathology  Discussed need for radiation therapy and antihormonal therapy  Refer to Radiation and Medical oncology  11/14/19 Med Onc, Dr Brenda Peters consult (plan to move this up sooner)  1/14/2020 Surg Onc follow-up          Malignant neoplasm of upper-outer quadrant of left breast in female, estrogen receptor positive ([facility] [address] )    8/2019 Initial Diagnosis     Invasive breast carcinoma, left breast      8/13/2019 Biopsy     Left breast ultrasound-guided biopsy  A  2 o'clock, 14 cm from nipple  Invasive mammary carcinoma of no special type (ductal, not otherwise specified)  Grade 2  ER 90  SD 60  HER2 1+    B  Left axillary lymph node  Portion of lymph node, negative for carcinoma      8/16/2019 Genetic Testing     The following genes were evaluated: SUDHA, BRCA1, BRCA2, CDH1, CHEK2, PALB2, PTEN, STK11, TP53  Negative result  No pathogenic sequence variants or deletions/dupllications identified  [employer]      9/24/2019 Surgery     Left breast needle localized lumpectomy with sentinel lymph node biopsy  Invasive breast carcinoma of no special type (ductal NST)  Grade 2  1 9 cm  Margins negative  0/3 Lymph nodes  Anatomic/Prognostic Stage IA           Clinical Trial: No    OB/GYN History:  The patient underwent menarche at 9 years  Menopause Status pre  No LMP recorded (lmp unknown)   - LMP 2 years ago prior to first pregnancy  Hormone replacement therapy: no      2   Para 2   Age at first delivery being 34 years  Nursing: not currently, breast fed first baby for 6 months   Birth control pills: yes, Years used less than one year      Health Maintenance   Topic Date Due    Pneumococcal Vaccine: Pediatrics (0 to 5 Years) and At-Risk Patients (6 to 59 Years) (1 of 3 - PCV13) 1984    BMI: Followup Plan  1996    DTaP,Tdap,and Td Vaccines (1 - Tdap) 1999    INFLUENZA VACCINE  2019    MAMMOGRAM  2020    BMI: Adult  10/07/2020    Cervical Cancer Screening  2024    Pneumococcal Vaccine: 65+ Years (1 of 2 - PCV13) 2043    HEPATITIS B VACCINES  Aged Out       Past Medical History:   Diagnosis Date    Abnormal Pap smear of cervix     BRCA gene mutation negative 2019    [employer]    HPV (human papilloma virus) infection     Papanicolaou smear 2017    Postpartum depression     As per patient's spouse "it got severe"    Primary breast malignancy, left ([facility] [address] ) 2019    Rh negative state in antepartum period     Varicella     Vaccinated       Past Surgical History:   Procedure Laterality Date    BREAST LUMPECTOMY Left 2019    Procedure: BREAST NEEDLE LOCALIZED LUMPECTOMY (NEEDLE LOC AT 0930); Surgeon: Zehra Miller MD;  [facility];  Service: Surgical Oncology    COLPOSCOPY      LYMPH NODE BIOPSY Left 2019    Procedure: SENTINEL LYMPH NODE BIOPSY; LYMPHATIC MAPPING WITH BLUE DYE AND RADIOACTIVE DYE (INJECT AT 1130 BY DR COLEMAN IN THE OR);   Surgeon: Zehra Miller MD;  [facility];  Service: Surgical Oncology    MAMMO NEEDLE LOCALIZATION LEFT (ALL INC) Left 2019    US GUIDED BREAST BIOPSY LEFT COMPLETE Left 2019    US GUIDED BREAST LYMPH NODE BIOPSY LEFT Left 2019       Family History   Problem Relation Age of Onset    Stomach cancer Mother 37    Diabetes Maternal Grandmother     No Known Problems Father     No Known Problems Sister        Social History Tobacco Use    Smoking status: Former Smoker    Smokeless tobacco: Never Used    Tobacco comment: only social smoking many years ago   Substance Use Topics    Alcohol use: Yes     Frequency: 2-4 times a month     Drinks per session: 1 or 2    Drug use: No          Current Outpatient Medications:     EPINEPHrine (EPIPEN) 0 3 mg/0 3 mL SOAJ, Inject 0 3 mL (0 3 mg total) into a muscle once for 1 dose, Disp: 0 6 mL, Rfl: 0    escitalopram (LEXAPRO) 10 mg tablet, Take 1 tablet (10 mg total) by mouth daily, Disp: 30 tablet, Rfl: 0    Multiple Vitamins-Minerals (MULTIVITAMIN WOMEN) TABS, Take 1 tablet by mouth daily, Disp: , Rfl:     Pyridoxine HCl (VITAMIN B-6) 25 MG tablet, Take by mouth, Disp: , Rfl:     oxyCODONE-acetaminophen (PERCOCET) 5-325 mg per tablet, Take 1 tablet by mouth every 6 (six) hours as needed for moderate painMax Daily Amount: 4 tablets (Patient not taking: Reported on 10/7/2019), Disp: 6 tablet, Rfl: 0    Allergies   Allergen Reactions    Bee Venom         Review of Systems:  Review of Systems   Constitutional: Positive for fatigue  HENT: Negative  Eyes: Negative  Respiratory: Negative  Cardiovascular: Negative  Gastrointestinal: Negative  Endocrine: Negative  Genitourinary: Negative  Musculoskeletal: Negative  Skin:        Healing lumpectomy site, mild tenderness at left axilla   Allergic/Immunologic: Negative  Neurological: Negative  Hematological: Negative  Psychiatric/Behavioral: The patient is nervous/anxious (anxious/sad since diagnosis, started on lexapro)  Vitals:    10/10/19 0900   BP: 108/68   BP Location: Right arm   Pulse: 74   Resp: 18   Temp: 97 8 °F (36 6 °C)   TempSrc: Temporal   SpO2: 98%   Weight: 80 3 kg (177 lb)       Pain assessment: 0    Imaging:No images are attached to the encounter       Teaching RT education packet provided and possible s/e of breast radiation discussed

## 2019-10-10 NOTE — TELEPHONE ENCOUNTER
Dr Carlos Lane and I communicated he would like to order a mammo print  We will coordinate this for her  I contacted the patient and conveyed this information to her  I also explained that it may not be covered by her insurance  She was fine with the potential cost   All questions were answered to the patient's satisfaction

## 2019-10-11 DIAGNOSIS — Z17.0 MALIGNANT NEOPLASM OF UPPER-OUTER QUADRANT OF LEFT BREAST IN FEMALE, ESTROGEN RECEPTOR POSITIVE (HCC): Primary | ICD-10-CM

## 2019-10-11 DIAGNOSIS — C50.412 MALIGNANT NEOPLASM OF UPPER-OUTER QUADRANT OF LEFT BREAST IN FEMALE, ESTROGEN RECEPTOR POSITIVE (HCC): Primary | ICD-10-CM

## 2019-10-14 ENCOUNTER — TRANSCRIBE ORDERS (OUTPATIENT)
Dept: ADMINISTRATIVE | Facility: HOSPITAL | Age: 39
End: 2019-10-14

## 2019-10-14 DIAGNOSIS — M54.14 THORACIC RADICULOPATHY: Primary | ICD-10-CM

## 2019-10-15 NOTE — TELEPHONE ENCOUNTER
Pt has [facility] [employer] called joel & spoke to More call ref# [id number]  He sd that pt has an active policy effective date of 01/03/18  Runs on a cal year  For chemo & radiation pt is covered 1200%  No deduct  no co-pays,no out of pocket  Chemo drugs covered 100%  Radiation covered 100%  No deduct,no co-pays no out of pocket  pcp co-pay $15 specialist co-pay  $25 ER co-pay $100 but waived if admitted  Pt has a $200 deduct for diagnostic testing she has met that so the following services are covered at 100%  advanced & standard dx imaging  labs pt has to go an in network lab  Genetic & genomic testing are covered 100% since the deduct is met  In pt hospitalizations & out pt surgeries are covered at 100%  rx benefits thru [facility] & speciality pharmacy [facility] that[facility] the [facility]  Can buy & bill,  Not a cobra plan  This is primary  No other insurance benefits  Called the pt to go over the benefits but go her voicemail  left message for pt to all me back

## 2019-10-21 ENCOUNTER — TELEPHONE (OUTPATIENT)
Dept: SURGICAL ONCOLOGY | Facility: CLINIC | Age: 39
End: 2019-10-21

## 2019-10-21 NOTE — TELEPHONE ENCOUNTER
Patient called the office with new complaints of left breast hardness and swelling  Patient states that it feels like there is a rock in her breast where her surgery was  Reports massaging it every day when she showers  Denies any fevers or redness  Patient also states that she feels like something is poking out of her scar - she didn't know if it was a stitch or breast tissue  Appointment made for patient to see Dr Marc Cuevas on 10/23 at 3:00  Patient verbalized understanding of appointment details

## 2019-10-22 LAB — SCAN RESULT: NORMAL

## 2019-10-23 ENCOUNTER — OFFICE VISIT (OUTPATIENT)
Dept: SURGICAL ONCOLOGY | Facility: CLINIC | Age: 39
End: 2019-10-23

## 2019-10-23 VITALS
TEMPERATURE: 96.2 F | HEIGHT: 70 IN | SYSTOLIC BLOOD PRESSURE: 130 MMHG | HEART RATE: 62 BPM | RESPIRATION RATE: 18 BRPM | WEIGHT: 175 LBS | DIASTOLIC BLOOD PRESSURE: 80 MMHG | BODY MASS INDEX: 25.05 KG/M2

## 2019-10-23 DIAGNOSIS — Z17.0 MALIGNANT NEOPLASM OF UPPER-OUTER QUADRANT OF LEFT BREAST IN FEMALE, ESTROGEN RECEPTOR POSITIVE (HCC): ICD-10-CM

## 2019-10-23 DIAGNOSIS — N63.0 BREAST SWELLING: Primary | ICD-10-CM

## 2019-10-23 DIAGNOSIS — C50.412 MALIGNANT NEOPLASM OF UPPER-OUTER QUADRANT OF LEFT BREAST IN FEMALE, ESTROGEN RECEPTOR POSITIVE (HCC): ICD-10-CM

## 2019-10-23 PROCEDURE — 99024 POSTOP FOLLOW-UP VISIT: CPT | Performed by: SURGERY

## 2019-10-23 NOTE — PROGRESS NOTES
Surgical Oncology Breast Post-Op       [address] [facility]  [facility] [place]  [address] [facility] [address]  [place] [address]    Sharri Peraza  1980  [phone]  [address]  [facility] [place]  [address] [facility] [place] [address]    Chief Complaint:   Micheal Walker is seen for a post-operative visit of her recent breast surgery  Oncology History:        Malignant neoplasm of upper-outer quadrant of left breast in female, estrogen receptor positive ([facility] [address] )    8/2019 Initial Diagnosis     Invasive breast carcinoma, left breast      8/13/2019 Biopsy     Left breast ultrasound-guided biopsy  A  2 o'clock, 14 cm from nipple  Invasive mammary carcinoma of no special type (ductal, not otherwise specified)  Grade 2  ER 90  DE 60  HER2 1+    B  Left axillary lymph node  Portion of lymph node, negative for carcinoma      8/16/2019 Genetic Testing     The following genes were evaluated: SUDHA, BRCA1, BRCA2, CDH1, CHEK2, PALB2, PTEN, STK11, TP53  Negative result  No pathogenic sequence variants or deletions/dupllications identified  [employer]      9/24/2019 Surgery     Left breast needle localized lumpectomy with sentinel lymph node biopsy  Invasive breast carcinoma of no special type (ductal NST)  Grade 2  1 9 cm  Margins negative  0/3 Lymph nodes  Anatomic/Prognostic Stage IA      10/11/2019 Genomic Testing     MammaPrint   Pending         Assessment & Plan:   Assessment/Plan    Patient presents because she has had some swelling and soreness in the upper outer quadrant (by her incision) of the left breast   Clinical examination demonstrates she is healing quite well  Ultrasound demonstrates a resolving seroma with no worrisome features  She has a very good cosmetic outcome  I recommend no intervention for this      The patient's mammo print score came back as high risk however from a range of 0 to -1 it is -0 05 which is just barely over the tom for being in the high risk category  I have recommended she think about this prior to meeting with her medical oncologist and ask how much of a benefit would chemotherapy give her compared to anti hormonal therapy alone  I explained the greatest benefit most likely would be from the anti hormonal therapy, but that chemotherapy may provide her a smaller benefit  History of Present Illness:   See Oncology History    Interval History:   See Assessment & Plan    Review of Systems:   Review of Systems   All other systems reviewed and are negative  Past Medical History:     Patient Active Problem List   Diagnosis    AMA (advanced maternal age) multigravida 33+    Previous  labor affecting pregnancy, antepartum    39 weeks gestation of pregnancy    Positive GBS test    Malignant neoplasm of upper-outer quadrant of left breast in female, estrogen receptor positive ([facility] [address] )    Forceps delivery with baby delivered     Past Medical History:   Diagnosis Date    Abnormal Pap smear of cervix     BRCA gene mutation negative 2019    [employer]    HPV (human papilloma virus) infection     Papanicolaou smear 2017    Postpartum depression     As per patient's spouse "it got severe"    Primary breast malignancy, left ([facility] [address] ) 2019    Rh negative state in antepartum period     Varicella     Vaccinated     Past Surgical History:   Procedure Laterality Date    BREAST LUMPECTOMY Left 2019    Procedure: BREAST NEEDLE LOCALIZED LUMPECTOMY (NEEDLE LOC AT 0930); Surgeon: Salma Boothe MD;  [facility];  Service: Surgical Oncology    COLPOSCOPY      LYMPH NODE BIOPSY Left 2019    Procedure: SENTINEL LYMPH NODE BIOPSY; LYMPHATIC MAPPING WITH BLUE DYE AND RADIOACTIVE DYE (INJECT AT 1130 BY DR COLEMAN IN THE OR);   Surgeon: Salma Boothe MD;  [facility];  Service: Surgical Oncology    MAMMO NEEDLE LOCALIZATION LEFT (ALL INC) Left 2019    US GUIDED BREAST BIOPSY LEFT COMPLETE Left 2019  US GUIDED BREAST LYMPH NODE BIOPSY LEFT Left 8/13/2019     Family History   Problem Relation Age of Onset    Stomach cancer Mother 37    Diabetes Maternal Grandmother     No Known Problems Father     No Known Problems Sister      Social History     Socioeconomic History    Marital status: /Civil Union     Spouse name: Not on file    Number of children: Not on file    Years of education: Not on file    Highest education level: Not on file   Occupational History    Not on file   Social Needs    Financial resource strain: Not on file    Food insecurity:     Worry: Not on file     Inability: Not on file    Transportation needs:     Medical: Not on file     Non-medical: Not on file   Tobacco Use    Smoking status: Former Smoker    Smokeless tobacco: Never Used    Tobacco comment: only social smoking many years ago   Substance and Sexual Activity    Alcohol use: Yes     Frequency: 2-4 times a month     Drinks per session: 1 or 2    Drug use: No    Sexual activity: Not Currently   Lifestyle    Physical activity:     Days per week: Not on file     Minutes per session: Not on file    Stress: Not on file   Relationships    Social connections:     Talks on phone: Not on file     Gets together: Not on file     Attends [employer] service: Not on file     Active member of club or organization: Not on file     Attends meetings of clubs or organizations: Not on file     Relationship status: Not on file    Intimate partner violence:     Fear of current or ex partner: Not on file     Emotionally abused: Not on file     Physically abused: Not on file     Forced sexual activity: Not on file   Other Topics Concern    Not on file   Social History Narrative    Not on file       Current Outpatient Medications:     escitalopram (LEXAPRO) 10 mg tablet, Take 1 tablet (10 mg total) by mouth daily, Disp: 30 tablet, Rfl: 0    Multiple Vitamins-Minerals (MULTIVITAMIN WOMEN) TABS, Take 1 tablet by mouth daily, Disp: , Rfl:     EPINEPHrine (EPIPEN) 0 3 mg/0 3 mL SOAJ, Inject 0 3 mL (0 3 mg total) into a muscle once for 1 dose, Disp: 0 6 mL, Rfl: 0    oxyCODONE-acetaminophen (PERCOCET) 5-325 mg per tablet, Take 1 tablet by mouth every 6 (six) hours as needed for moderate painMax Daily Amount: 4 tablets (Patient not taking: Reported on 10/7/2019), Disp: 6 tablet, Rfl: 0    Pyridoxine HCl (VITAMIN B-6) 25 MG tablet, Take by mouth, Disp: , Rfl:   Allergies   Allergen Reactions    Bee Venom        Physical Exams:     Vitals:    10/23/19 1516   BP: 130/80   Pulse: 62   Resp: 18   Temp: (!) 96 2 °F (35 7 °C)     Physical Exam   Pulmonary/Chest:   Examination of the left breast demonstrates a good cosmetic outcome  There is no evidence of infection  There is no swelling or clinical detectable seroma  Ultrasound demonstrates a normal resolving lumpectomy cavity  Results & Discussion:   I recommended the patient follow up with Medical Oncology and Radiation Oncology as outlined above  We will see her back as previously scheduled

## 2019-10-25 ENCOUNTER — HOSPITAL ENCOUNTER (OUTPATIENT)
Dept: RADIOLOGY | Facility: HOSPITAL | Age: 39
Discharge: HOME | End: 2019-10-25
Attending: FAMILY MEDICINE
Payer: COMMERCIAL

## 2019-10-25 DIAGNOSIS — M54.14 THORACIC RADICULOPATHY: ICD-10-CM

## 2019-10-25 PROCEDURE — 72146 MRI CHEST SPINE W/O DYE: CPT

## 2019-10-30 ENCOUNTER — CONSULT (OUTPATIENT)
Dept: HEMATOLOGY ONCOLOGY | Facility: CLINIC | Age: 39
End: 2019-10-30
Payer: COMMERCIAL

## 2019-10-30 VITALS
WEIGHT: 181 LBS | RESPIRATION RATE: 16 BRPM | DIASTOLIC BLOOD PRESSURE: 70 MMHG | BODY MASS INDEX: 25.91 KG/M2 | OXYGEN SATURATION: 98 % | SYSTOLIC BLOOD PRESSURE: 110 MMHG | TEMPERATURE: 98.1 F | HEART RATE: 74 BPM | HEIGHT: 70 IN

## 2019-10-30 DIAGNOSIS — Z17.0 MALIGNANT NEOPLASM OF UPPER-OUTER QUADRANT OF LEFT BREAST IN FEMALE, ESTROGEN RECEPTOR POSITIVE (HCC): Primary | ICD-10-CM

## 2019-10-30 DIAGNOSIS — C50.412 MALIGNANT NEOPLASM OF UPPER-OUTER QUADRANT OF LEFT BREAST IN FEMALE, ESTROGEN RECEPTOR POSITIVE (HCC): Primary | ICD-10-CM

## 2019-10-30 DIAGNOSIS — T50.905A DRUG-RELATED HAIR LOSS: Primary | ICD-10-CM

## 2019-10-30 DIAGNOSIS — L65.8 DRUG-RELATED HAIR LOSS: Primary | ICD-10-CM

## 2019-10-30 PROCEDURE — 99245 OFF/OP CONSLTJ NEW/EST HI 55: CPT | Performed by: INTERNAL MEDICINE

## 2019-10-30 RX ORDER — ONDANSETRON 4 MG/1
4 TABLET, FILM COATED ORAL EVERY 8 HOURS PRN
Qty: 20 TABLET | Refills: 1 | Status: SHIPPED | OUTPATIENT
Start: 2019-10-30 | End: 2021-03-24

## 2019-10-30 RX ORDER — SODIUM CHLORIDE 9 MG/ML
20 INJECTION, SOLUTION INTRAVENOUS ONCE
Status: CANCELLED | OUTPATIENT
Start: 2019-11-22

## 2019-10-30 RX ORDER — SODIUM CHLORIDE 9 MG/ML
20 INJECTION, SOLUTION INTRAVENOUS ONCE
Status: CANCELLED | OUTPATIENT
Start: 2019-11-04

## 2019-10-30 NOTE — PROGRESS NOTES
Hematology / Oncology Outpatient Consult Note    Pascale King 37 y o  female [id number]         Date:  10/30/2019    Assessment / Plan:  A 49-year-old premenopausal woman with newly diagnosed stage IA left breast cancer, grade 2, ER 90% positive, NC 60% positive, HER2 negative disease  She is negative for BRCA gene mutation  She underwent lumpectomy and sentinel lymph node biopsy, resulting in LAVON  She presents today with her  to discuss adjuvant treatment options  Her tumor was found to be high risk based on a MammaPrint and luminal B type  We had extensive discussion regarding the diagnosis, staging information, tumor phenotype, interpretation of MammaPrint as well as luminal B type, prognosis and treatment options  Based on her young age, MammaPrint being high risk, adjuvant chemotherapy is indicated  I recommended her to have Taxotere and cyclophosphamide every 3 weeks x4 cycles  Side effects of this regimen was thoroughly discussed, including but not limited to alopecia, nausea, vomiting, neutropenia, risk of infection, allergic reaction, neuropathy, peripheral edema as well as fatigue  I also told her that she has small risk of permanent menopause  She is in agreement with my recommendations  We also discussed subsequent hormonal therapy which is likely to be tamoxifen  We discussed possible future family planning with hormonal therapy  All the patient and her 's questions were answered to her satisfaction  She is going to have 1st cycle of chemotherapy in November 4, 2019 at [facility]  I will see her again in few weeks after the 1st cycle treatment  She was instructed to give us a call immediately if she has fever above 100 4  Subjective:     HPI:  A 49-year-old premenopausal woman who noticed left breast lump 5 months ago  She brought to medical attention  When she was found her lump, she was pregnant    She was found to have radiographic abnormality in her left breast which was biopsied in August 13, 2019 which showed invasive ductal carcinoma, grade 2  This was ER 90 % positive, TN 60% positive, HER2 negative disease  Subsequently, she delivered baby few weeks after her biopsy  She was seen by Dr Kacey Monterroso  She was negative for BRCA gene mutation  Therefore, she underwent left lumpectomy in September 24, 2019 which showed 19 mm of invasive ductal carcinoma, grade 2  3 sentinel lymph nodes were all negative for metastatic disease  Dr Kacey Monterroso sent her tumor tissue for MammaPrint which came back as high risk, luminal B type  She presents today with her  who is a emergency department physician at [employer] to discuss adjuvant treatment options  She has no past medical history  She feels well  She has no complaint of pain  Her weight is stable  She has no respiratory symptoms  She is a lifetime never smoker  She has no family history of breast or ovarian cancer  Her performance status is normal           Interval History:          Objective:     Primary Diagnosis: 1  Left breast cancer, stage IA (pT1c, pN0, M0) grade 2, ER 90% positive, TN 60% positive, HER2 negative disease  MammaPrint high risk  Diagnosed in September 2019    2  BRCA gene mutation negative  Cancer Staging:  Cancer Staging  Malignant neoplasm of upper-outer quadrant of left breast in female, estrogen receptor positive ([facility] [address] )  Staging form: Breast, AJCC 8th Edition  - Pathologic: Stage IA (pT1c, pN0(sn), cM0, G2, ER+, TN+, HER2-) - Unsigned  Neoadjuvant therapy: No  Laterality: Left  Tumor size (mm): 19  Method of lymph node assessment: [place] lymph node biopsy  Histologic grading system: 3 grade system        Previous Hematologic/ Oncologic Treatment:         Current Hematologic/ Oncologic Treatment:      Adjuvant chemotherapy with Taxotere and cyclophosphamide x4 cycle  1st cycle to be started in November 4, 2019       Disease Status: LAVON status post lumpectomy and sentinel lymph node biopsy  Test Results:    Pathology:     1 9 x 1 8 x 1 8 cm of invasive ductal carcinoma, grade 2  3 sentinel lymph nodes were negative for metastatic disease  ER 90% positive, FL 60% positive, HER2 negative disease  MammaPrint high risk, luminal B type  Stage IA (pT1c, pN0, M0)      Radiology:    Chest x-ray was negative for pulmonary disease  Laboratory:    See below  Physical Exam:      General Appearance:    Alert, oriented        Eyes:    PERRL   Ears:    Normal external ear canals, both ears   Nose:   Nares normal, septum midline   Throat:   Mucosa moist  Pharynx without injection  Neck:   Supple       Lungs:     Clear to auscultation bilaterally   Chest Wall:    No tenderness or deformity    Heart:    Regular rate and rhythm       Abdomen:     Soft, non-tender, bowel sounds +, no organomegaly           Extremities:   Extremities no cyanosis or edema       Skin:   no rash or icterus  Lymph nodes:   Cervical, supraclavicular, and axillary nodes normal   Neurologic:   CNII-XII intact, normal strength, sensation and reflexes     Throughout          Breast exam:   Lumpectomy scar at upper outer quadrant of her left breast with no palpable abnormalities  Right breast exam is negative  ROS: Review of Systems   All other systems reviewed and are negative  Imaging: Mri Thoracic Spine Wo Contrast    Result Date: 10/29/2019  Narrative: MRI THORACIC SPINE WITHOUT CONTRAST INDICATION: M54 14: Radiculopathy, thoracic region  Patient complains of mid abdominal pain  COMPARISON:  None  TECHNIQUE:  Sagittal T1, sagittal T2, sagittal inversion recovery, axial T2,  axial 2D MERGE  IMAGE QUALITY: Diagnostic  FINDINGS: ALIGNMENT: Normal alignment of the thoracic spine  No compression fracture  No subluxation  No scoliosis  MARROW SIGNAL:  Normal marrow signal is identified within the visualized bony structures    No discrete marrow lesion  THORACIC CORD: Normal signal within the thoracic cord  PARAVERTEBRAL SOFT TISSUES:  There is a mildly complex 1 1 x 1 4 cm cystic lesion within the right thyroid lobe  The lesion is taller than wider  A 1 cm T2 hyperintense lesion is also noted within the spleen, possibly representing a cyst or hemangioma  Incidentally noted aberrant right subclavian artery and small bilateral pleural effusions  THORACIC DEGENERATIVE CHANGE: Multilevel Schmorl's node deformities and small disc herniations without significant spinal canal or foraminal stenosis  Right central disc protrusion at C7-T1 tiny left central disc herniation at T3-4  Right central disc herniation at T5-T6 abuts the right ventral cord margin without cord signal abnormality  Right central disc herniation at T6-T7 with inferior migration but no spinal stenosis  Tiny central disc herniation at T9-T10  Mild disc bulges in the lower thoracic spine from T10-T11 to T12-L1 without spinal canal or foraminal stenosis  Impression: Mild degenerative changes of the thoracic spine without significant spinal canal or foraminal narrowing  Incidental1 4 cm T2 hyperintense lesion within the right thyroid lobe appears tall wider  Follow-up evaluation with sonography is recommended  Incidentally noted 1 cm splenic cyst versus hemangioma  Small bilateral pleural effusions  Workstation performed: [id number]         Labs:   Lab Results   Component Value Date    WBC 5 91 09/15/2019    HGB 13 9 09/15/2019    HCT 42 6 09/15/2019    MCV 92 09/15/2019     09/15/2019     Lab Results   Component Value Date    K 4 3 09/15/2019     (H) 09/15/2019    CO2 25 09/15/2019    BUN 16 09/15/2019    CREATININE 0 92 09/15/2019    GLUF 82 09/15/2019    CALCIUM 8 7 09/15/2019    AST 15 09/15/2019    ALT 25 09/15/2019    ALKPHOS 80 09/15/2019    EGFR 78 09/15/2019         Vital Sign:    Body surface area is 2 meters squared      Wt Readings from Last 3 Encounters: 10/30/19 82 1 kg (181 lb)   10/23/19 79 4 kg (175 lb)   10/10/19 80 3 kg (177 lb)        Temp Readings from Last 3 Encounters:   10/30/19 98 1 °F (36 7 °C) (Oral)   10/23/19 (!) 96 2 °F (35 7 °C) (Tympanic)   10/10/19 97 8 °F (36 6 °C) (Temporal)        BP Readings from Last 3 Encounters:   10/30/19 110/70   10/23/19 130/80   10/10/19 108/68         Pulse Readings from Last 3 Encounters:   10/30/19 74   10/23/19 62   10/10/19 74     @LASTSAO2(3)@    Active Problems:   Patient Active Problem List   Diagnosis    AMA (advanced maternal age) multigravida 33+    Previous  labor affecting pregnancy, antepartum    39 weeks gestation of pregnancy    Positive GBS test    Malignant neoplasm of upper-outer quadrant of left breast in female, estrogen receptor positive ([facility] [address] )    Forceps delivery with baby delivered       Past Medical History:   Past Medical History:   Diagnosis Date    Abnormal Pap smear of cervix     BRCA gene mutation negative 2019    [employer]    HPV (human papilloma virus) infection     Papanicolaou smear 2017    Postpartum depression     As per patient's spouse "it got severe"    Primary breast malignancy, left ([facility] [address] ) 2019    Rh negative state in antepartum period     Varicella     Vaccinated       Surgical History:   Past Surgical History:   Procedure Laterality Date    BREAST LUMPECTOMY Left 2019    Procedure: BREAST NEEDLE LOCALIZED LUMPECTOMY (NEEDLE LOC AT 0930); Surgeon: Mary Ramirez MD;  [facility];  Service: Surgical Oncology    COLPOSCOPY      LYMPH NODE BIOPSY Left 2019    Procedure: SENTINEL LYMPH NODE BIOPSY; LYMPHATIC MAPPING WITH BLUE DYE AND RADIOACTIVE DYE (INJECT AT 1130 BY DR COLEMAN IN THE OR);   Surgeon: Mary Ramirez MD;  [facility];  Service: Surgical Oncology    MAMMO NEEDLE LOCALIZATION LEFT (ALL INC) Left 2019    US GUIDED BREAST BIOPSY LEFT COMPLETE Left 2019    US GUIDED BREAST LYMPH NODE BIOPSY LEFT Left 8/13/2019       Family History:    Family History   Problem Relation Age of Onset    Stomach cancer Mother 37    Diabetes Maternal Grandmother     No Known Problems Father     No Known Problems Sister        Cancer-related family history includes Stomach cancer (age of onset: 37) in her mother      Social History:   Social History     Socioeconomic History    Marital status: /Civil Union     Spouse name: Not on file    Number of children: Not on file    Years of education: Not on file    Highest education level: Not on file   Occupational History    Not on file   Social Needs    Financial resource strain: Not on file    Food insecurity:     Worry: Not on file     Inability: Not on file    Transportation needs:     Medical: Not on file     Non-medical: Not on file   Tobacco Use    Smoking status: Former Smoker    Smokeless tobacco: Never Used    Tobacco comment: only social smoking many years ago   Substance and Sexual Activity    Alcohol use: Yes     Frequency: 2-4 times a month     Drinks per session: 1 or 2    Drug use: No    Sexual activity: Not Currently   Lifestyle    Physical activity:     Days per week: Not on file     Minutes per session: Not on file    Stress: Not on file   Relationships    Social connections:     Talks on phone: Not on file     Gets together: Not on file     Attends [employer] service: Not on file     Active member of club or organization: Not on file     Attends meetings of clubs or organizations: Not on file     Relationship status: Not on file    Intimate partner violence:     Fear of current or ex partner: Not on file     Emotionally abused: Not on file     Physically abused: Not on file     Forced sexual activity: Not on file   Other Topics Concern    Not on file   Social History Narrative    Not on file       Current Medications:   Current Outpatient Medications   Medication Sig Dispense Refill    escitalopram (LEXAPRO) 10 mg tablet Take 1 tablet (10 mg total) by mouth daily 30 tablet 0    Multiple Vitamins-Minerals (MULTIVITAMIN WOMEN) TABS Take 1 tablet by mouth daily      Pyridoxine HCl (VITAMIN B-6) 25 MG tablet Take by mouth      EPINEPHrine (EPIPEN) 0 3 mg/0 3 mL SOAJ Inject 0 3 mL (0 3 mg total) into a muscle once for 1 dose 0 6 mL 0    oxyCODONE-acetaminophen (PERCOCET) 5-325 mg per tablet Take 1 tablet by mouth every 6 (six) hours as needed for moderate painMax Daily Amount: 4 tablets (Patient not taking: Reported on 10/7/2019) 6 tablet 0     No current facility-administered medications for this visit  Allergies:    Allergies   Allergen Reactions    Bee Venom

## 2019-10-30 NOTE — LETTER
October 30, 2019     MD Narendra Whitehead [address]  [address]  [address] [facility] [address]    Patient: Derick Juarez   YOB: 1980   Date of Visit: 10/30/2019       Dear Dr Davina Bach: Thank you for referring Derick Juarez to me for evaluation  Below are my notes for this consultation  If you have questions, please do not hesitate to call me  I look forward to following your patient along with you  Sincerely,        J Carlos Villagran MD        CC: MD Roni Cordoba MD Darien Comas, MD  10/30/2019 10:23 AM  Sign at close encounter  Hematology / Oncology Outpatient Consult Note    Derick Juarez 37 y o  female [id number]         Date:  10/30/2019    Assessment / Plan:  A 68-year-old premenopausal woman with newly diagnosed stage IA left breast cancer, grade 2, ER 90% positive, NM 60% positive, HER2 negative disease  She is negative for BRCA gene mutation  She underwent lumpectomy and sentinel lymph node biopsy, resulting in LAVON  She presents today with her  to discuss adjuvant treatment options  Her tumor was found to be high risk based on a MammaPrint and luminal B type  We had extensive discussion regarding the diagnosis, staging information, tumor phenotype, interpretation of MammaPrint as well as luminal B type, prognosis and treatment options  Based on her young age, MammaPrint being high risk, adjuvant chemotherapy is indicated  I recommended her to have Taxotere and cyclophosphamide every 3 weeks x4 cycles  Side effects of this regimen was thoroughly discussed, including but not limited to alopecia, nausea, vomiting, neutropenia, risk of infection, allergic reaction, neuropathy, peripheral edema as well as fatigue  I also told her that she has small risk of permanent menopause  She is in agreement with my recommendations  We also discussed subsequent hormonal therapy which is likely to be tamoxifen    We discussed possible future family planning with hormonal therapy  All the patient and her 's questions were answered to her satisfaction  She is going to have 1st cycle of chemotherapy in November 4, 2019 at [address]  I will see her again in few weeks after the 1st cycle treatment  She was instructed to give us a call immediately if she has fever above 100 4  Subjective:     HPI:  A 47-year-old premenopausal woman who noticed left breast lump 5 months ago  She brought to medical attention  When she was found her lump, she was pregnant  She was found to have radiographic abnormality in her left breast which was biopsied in August 13, 2019 which showed invasive ductal carcinoma, grade 2  This was ER 90 % positive, UT 60% positive, HER2 negative disease  Subsequently, she delivered baby few weeks after her biopsy  She was seen by Dr Lizzie Ramirez  She was negative for BRCA gene mutation  Therefore, she underwent left lumpectomy in September 24, 2019 which showed 19 mm of invasive ductal carcinoma, grade 2  3 sentinel lymph nodes were all negative for metastatic disease  Dr Lizzie Ramirez sent her tumor tissue for MammaPrint which came back as high risk, luminal B type  She presents today with her  who is a emergency department physician at [employer] [facility] to discuss adjuvant treatment options  She has no past medical history  She feels well  She has no complaint of pain  Her weight is stable  She has no respiratory symptoms  She is a lifetime never smoker  She has no family history of breast or ovarian cancer  Her performance status is normal           Interval History:          Objective:     Primary Diagnosis: 1  Left breast cancer, stage IA (pT1c, pN0, M0) grade 2, ER 90% positive, UT 60% positive, HER2 negative disease  MammaPrint high risk  Diagnosed in September 2019    2  BRCA gene mutation negative      Cancer Staging:  Cancer Staging  Malignant neoplasm of upper-outer quadrant of left breast in female, estrogen receptor positive ([facility] [address] )  Staging form: Breast, AJCC 8th Edition  - Pathologic: Stage IA (pT1c, pN0(sn), cM0, G2, ER+, VT+, HER2-) - Unsigned  Neoadjuvant therapy: No  Laterality: Left  Tumor size (mm): 19  Method of lymph node assessment: [place] lymph node biopsy  Histologic grading system: 3 grade system        Previous Hematologic/ Oncologic Treatment:         Current Hematologic/ Oncologic Treatment:      Adjuvant chemotherapy with Taxotere and cyclophosphamide x4 cycle  1st cycle to be started in November 4, 2019  Disease Status:     LAVON status post lumpectomy and sentinel lymph node biopsy  Test Results:    Pathology:     1 9 x 1 8 x 1 8 cm of invasive ductal carcinoma, grade 2  3 sentinel lymph nodes were negative for metastatic disease  ER 90% positive, VT 60% positive, HER2 negative disease  MammaPrint high risk, luminal B type  Stage IA (pT1c, pN0, M0)      Radiology:    Chest x-ray was negative for pulmonary disease  Laboratory:    See below  Physical Exam:      General Appearance:    Alert, oriented        Eyes:    PERRL   Ears:    Normal external ear canals, both ears   Nose:   Nares normal, septum midline   Throat:   Mucosa moist  Pharynx without injection  Neck:   Supple       Lungs:     Clear to auscultation bilaterally   Chest Wall:    No tenderness or deformity    Heart:    Regular rate and rhythm       Abdomen:     Soft, non-tender, bowel sounds +, no organomegaly           Extremities:   Extremities no cyanosis or edema       Skin:   no rash or icterus  Lymph nodes:   Cervical, supraclavicular, and axillary nodes normal   Neurologic:   CNII-XII intact, normal strength, sensation and reflexes     Throughout          Breast exam:   Lumpectomy scar at upper outer quadrant of her left breast with no palpable abnormalities  Right breast exam is negative           ROS: Review of Systems   All other systems reviewed and are negative  Imaging: Mri Thoracic Spine Wo Contrast    Result Date: 10/29/2019  Narrative: MRI THORACIC SPINE WITHOUT CONTRAST INDICATION: M54 14: Radiculopathy, thoracic region  Patient complains of mid abdominal pain  COMPARISON:  None  TECHNIQUE:  Sagittal T1, sagittal T2, sagittal inversion recovery, axial T2,  axial 2D MERGE  IMAGE QUALITY: Diagnostic  FINDINGS: ALIGNMENT: Normal alignment of the thoracic spine  No compression fracture  No subluxation  No scoliosis  MARROW SIGNAL:  Normal marrow signal is identified within the visualized bony structures  No discrete marrow lesion  THORACIC CORD: Normal signal within the thoracic cord  PARAVERTEBRAL SOFT TISSUES:  There is a mildly complex 1 1 x 1 4 cm cystic lesion within the right thyroid lobe  The lesion is taller than wider  A 1 cm T2 hyperintense lesion is also noted within the spleen, possibly representing a cyst or hemangioma  Incidentally noted aberrant right subclavian artery and small bilateral pleural effusions  THORACIC DEGENERATIVE CHANGE: Multilevel Schmorl's node deformities and small disc herniations without significant spinal canal or foraminal stenosis  Right central disc protrusion at C7-T1 tiny left central disc herniation at T3-4  Right central disc herniation at T5-T6 abuts the right ventral cord margin without cord signal abnormality  Right central disc herniation at T6-T7 with inferior migration but no spinal stenosis  Tiny central disc herniation at T9-T10  Mild disc bulges in the lower thoracic spine from T10-T11 to T12-L1 without spinal canal or foraminal stenosis  Impression: Mild degenerative changes of the thoracic spine without significant spinal canal or foraminal narrowing  Incidental1 4 cm T2 hyperintense lesion within the right thyroid lobe appears tall wider  Follow-up evaluation with sonography is recommended  Incidentally noted 1 cm splenic cyst versus hemangioma   Small bilateral pleural effusions  Workstation performed: [id number]         Labs:   Lab Results   Component Value Date    WBC 5 91 09/15/2019    HGB 13 9 09/15/2019    HCT 42 6 09/15/2019    MCV 92 09/15/2019     09/15/2019     Lab Results   Component Value Date    K 4 3 09/15/2019     (H) 09/15/2019    CO2 25 09/15/2019    BUN 16 09/15/2019    CREATININE 0 92 09/15/2019    GLUF 82 09/15/2019    CALCIUM 8 7 09/15/2019    AST 15 09/15/2019    ALT 25 09/15/2019    ALKPHOS 80 09/15/2019    EGFR 78 09/15/2019         Vital Sign:    Body surface area is 2 meters squared      Wt Readings from Last 3 Encounters:   10/30/19 82 1 kg (181 lb)   10/23/19 79 4 kg (175 lb)   10/10/19 80 3 kg (177 lb)        Temp Readings from Last 3 Encounters:   10/30/19 98 1 °F (36 7 °C) (Oral)   10/23/19 (!) 96 2 °F (35 7 °C) (Tympanic)   10/10/19 97 8 °F (36 6 °C) (Temporal)        BP Readings from Last 3 Encounters:   10/30/19 110/70   10/23/19 130/80   10/10/19 108/68         Pulse Readings from Last 3 Encounters:   10/30/19 74   10/23/19 62   10/10/19 74     @LASTSAO2(3)@    Active Problems:   Patient Active Problem List   Diagnosis    AMA (advanced maternal age) multigravida 33+    Previous  labor affecting pregnancy, antepartum    44 weeks gestation of pregnancy    Positive GBS test    Malignant neoplasm of upper-outer quadrant of left breast in female, estrogen receptor positive ([facility] [address] )    Forceps delivery with baby delivered       Past Medical History:   Past Medical History:   Diagnosis Date    Abnormal Pap smear of cervix     BRCA gene mutation negative 2019    [employer]    HPV (human papilloma virus) infection     Papanicolaou smear 2017    Postpartum depression     As per patient's spouse "it got severe"    Primary breast malignancy, left ([facility] [address] ) 2019    Rh negative state in antepartum period     Varicella     Vaccinated       Surgical History:   Past Surgical History:   Procedure Laterality Date    BREAST LUMPECTOMY Left 9/24/2019    Procedure: BREAST NEEDLE LOCALIZED LUMPECTOMY (NEEDLE LOC AT 0930); Surgeon: Varsha Lima MD;  [facility];  Service: Surgical Oncology    COLPOSCOPY      LYMPH NODE BIOPSY Left 9/24/2019    Procedure: SENTINEL LYMPH NODE BIOPSY; LYMPHATIC MAPPING WITH BLUE DYE AND RADIOACTIVE DYE (INJECT AT 1130 BY DR COLEMAN IN THE OR); Surgeon: Varsha Lima MD;  [facility];  Service: Surgical Oncology    MAMMO NEEDLE LOCALIZATION LEFT (ALL INC) Left 9/24/2019    US GUIDED BREAST BIOPSY LEFT COMPLETE Left 8/13/2019    US GUIDED BREAST LYMPH NODE BIOPSY LEFT Left 8/13/2019       Family History:    Family History   Problem Relation Age of Onset    Stomach cancer Mother 37    Diabetes Maternal Grandmother     No Known Problems Father     No Known Problems Sister        Cancer-related family history includes Stomach cancer (age of onset: 37) in her mother      Social History:   Social History     Socioeconomic History    Marital status: /Civil Union     Spouse name: Not on file    Number of children: Not on file    Years of education: Not on file    Highest education level: Not on file   Occupational History    Not on file   Social Needs    Financial resource strain: Not on file    Food insecurity:     Worry: Not on file     Inability: Not on file    Transportation needs:     Medical: Not on file     Non-medical: Not on file   Tobacco Use    Smoking status: Former Smoker    Smokeless tobacco: Never Used    Tobacco comment: only social smoking many years ago   Substance and Sexual Activity    Alcohol use: Yes     Frequency: 2-4 times a month     Drinks per session: 1 or 2    Drug use: No    Sexual activity: Not Currently   Lifestyle    Physical activity:     Days per week: Not on file     Minutes per session: Not on file    Stress: Not on file   Relationships    Social connections:     Talks on phone: Not on file     Gets together: Not on file     Attends Synagogue service: Not on file     Active member of club or organization: Not on file     Attends meetings of clubs or organizations: Not on file     Relationship status: Not on file    Intimate partner violence:     Fear of current or ex partner: Not on file     Emotionally abused: Not on file     Physically abused: Not on file     Forced sexual activity: Not on file   Other Topics Concern    Not on file   Social History Narrative    Not on file       Current Medications:   Current Outpatient Medications   Medication Sig Dispense Refill    escitalopram (LEXAPRO) 10 mg tablet Take 1 tablet (10 mg total) by mouth daily 30 tablet 0    Multiple Vitamins-Minerals (MULTIVITAMIN WOMEN) TABS Take 1 tablet by mouth daily      Pyridoxine HCl (VITAMIN B-6) 25 MG tablet Take by mouth      EPINEPHrine (EPIPEN) 0 3 mg/0 3 mL SOAJ Inject 0 3 mL (0 3 mg total) into a muscle once for 1 dose 0 6 mL 0    oxyCODONE-acetaminophen (PERCOCET) 5-325 mg per tablet Take 1 tablet by mouth every 6 (six) hours as needed for moderate painMax Daily Amount: 4 tablets (Patient not taking: Reported on 10/7/2019) 6 tablet 0     No current facility-administered medications for this visit  Allergies:    Allergies   Allergen Reactions    Bee Venom

## 2019-11-04 ENCOUNTER — HOSPITAL ENCOUNTER (OUTPATIENT)
Dept: INFUSION CENTER | Facility: HOSPITAL | Age: 39
Discharge: HOME | End: 2019-11-04
Attending: INTERNAL MEDICINE
Payer: COMMERCIAL

## 2019-11-04 VITALS
BODY MASS INDEX: 25.03 KG/M2 | RESPIRATION RATE: 26 BRPM | HEIGHT: 71 IN | HEART RATE: 57 BPM | WEIGHT: 178.79 LBS | OXYGEN SATURATION: 100 % | TEMPERATURE: 97.5 F | DIASTOLIC BLOOD PRESSURE: 81 MMHG | SYSTOLIC BLOOD PRESSURE: 130 MMHG

## 2019-11-04 DIAGNOSIS — Z17.0 MALIGNANT NEOPLASM OF UPPER-OUTER QUADRANT OF LEFT BREAST IN FEMALE, ESTROGEN RECEPTOR POSITIVE (HCC): Primary | ICD-10-CM

## 2019-11-04 DIAGNOSIS — C50.412 MALIGNANT NEOPLASM OF UPPER-OUTER QUADRANT OF LEFT BREAST IN FEMALE, ESTROGEN RECEPTOR POSITIVE (HCC): Primary | ICD-10-CM

## 2019-11-04 LAB
ALBUMIN SERPL BCP-MCNC: 3.7 G/DL (ref 3.5–5)
ALP SERPL-CCNC: 68 U/L (ref 46–116)
ALT SERPL W P-5'-P-CCNC: 24 U/L (ref 12–78)
ANION GAP SERPL CALCULATED.3IONS-SCNC: 6 MMOL/L (ref 4–13)
AST SERPL W P-5'-P-CCNC: 16 U/L (ref 5–45)
BASOPHILS # BLD AUTO: 0.07 THOUSANDS/ΜL (ref 0–0.1)
BASOPHILS NFR BLD AUTO: 2 % (ref 0–1)
BILIRUB SERPL-MCNC: 0.43 MG/DL (ref 0.2–1)
BUN SERPL-MCNC: 22 MG/DL (ref 5–25)
CALCIUM SERPL-MCNC: 8.8 MG/DL (ref 8.3–10.1)
CHLORIDE SERPL-SCNC: 111 MMOL/L (ref 100–108)
CO2 SERPL-SCNC: 24 MMOL/L (ref 21–32)
CREAT SERPL-MCNC: 0.89 MG/DL (ref 0.6–1.3)
EOSINOPHIL # BLD AUTO: 0.27 THOUSAND/ΜL (ref 0–0.61)
EOSINOPHIL NFR BLD AUTO: 6 % (ref 0–6)
ERYTHROCYTE [DISTWIDTH] IN BLOOD BY AUTOMATED COUNT: 14.3 % (ref 11.6–15.1)
GFR SERPL CREATININE-BSD FRML MDRD: 81 ML/MIN/1.73SQ M
GLUCOSE SERPL-MCNC: 88 MG/DL (ref 65–140)
HCT VFR BLD AUTO: 39.7 % (ref 34.8–46.1)
HGB BLD-MCNC: 12.9 G/DL (ref 11.5–15.4)
IMM GRANULOCYTES # BLD AUTO: 0 THOUSAND/UL (ref 0–0.2)
IMM GRANULOCYTES NFR BLD AUTO: 0 % (ref 0–2)
LYMPHOCYTES # BLD AUTO: 1.91 THOUSANDS/ΜL (ref 0.6–4.47)
LYMPHOCYTES NFR BLD AUTO: 41 % (ref 14–44)
MCH RBC QN AUTO: 29.7 PG (ref 26.8–34.3)
MCHC RBC AUTO-ENTMCNC: 32.5 G/DL (ref 31.4–37.4)
MCV RBC AUTO: 91 FL (ref 82–98)
MONOCYTES # BLD AUTO: 0.42 THOUSAND/ΜL (ref 0.17–1.22)
MONOCYTES NFR BLD AUTO: 9 % (ref 4–12)
NEUTROPHILS # BLD AUTO: 1.96 THOUSANDS/ΜL (ref 1.85–7.62)
NEUTS SEG NFR BLD AUTO: 42 % (ref 43–75)
NRBC BLD AUTO-RTO: 0 /100 WBCS
PLATELET # BLD AUTO: 244 THOUSANDS/UL (ref 149–390)
PMV BLD AUTO: 10.1 FL (ref 8.9–12.7)
POTASSIUM SERPL-SCNC: 4.3 MMOL/L (ref 3.5–5.3)
PROT SERPL-MCNC: 7.5 G/DL (ref 6.4–8.2)
RBC # BLD AUTO: 4.35 MILLION/UL (ref 3.81–5.12)
SODIUM SERPL-SCNC: 141 MMOL/L (ref 136–145)
WBC # BLD AUTO: 4.63 THOUSAND/UL (ref 4.31–10.16)

## 2019-11-04 PROCEDURE — 80053 COMPREHEN METABOLIC PANEL: CPT | Performed by: INTERNAL MEDICINE

## 2019-11-04 PROCEDURE — 96413 CHEMO IV INFUSION 1 HR: CPT

## 2019-11-04 PROCEDURE — 96375 TX/PRO/DX INJ NEW DRUG ADDON: CPT

## 2019-11-04 PROCEDURE — 96417 CHEMO IV INFUS EACH ADDL SEQ: CPT

## 2019-11-04 PROCEDURE — 96367 TX/PROPH/DG ADDL SEQ IV INF: CPT

## 2019-11-04 PROCEDURE — 85025 COMPLETE CBC W/AUTO DIFF WBC: CPT | Performed by: INTERNAL MEDICINE

## 2019-11-04 RX ORDER — DIPHENHYDRAMINE HYDROCHLORIDE 50 MG/ML
50 INJECTION INTRAMUSCULAR; INTRAVENOUS ONCE
Status: COMPLETED | OUTPATIENT
Start: 2019-11-04 | End: 2019-11-04

## 2019-11-04 RX ORDER — SODIUM CHLORIDE 9 MG/ML
20 INJECTION, SOLUTION INTRAVENOUS ONCE
Status: COMPLETED | OUTPATIENT
Start: 2019-11-04 | End: 2019-11-04

## 2019-11-04 RX ORDER — RANITIDINE 25 MG/ML
50 INJECTION, SOLUTION INTRAMUSCULAR; INTRAVENOUS ONCE
Status: COMPLETED | OUTPATIENT
Start: 2019-11-04 | End: 2019-11-04

## 2019-11-04 RX ADMIN — HYDROCORTISONE SODIUM SUCCINATE 100 MG: 100 INJECTION, POWDER, FOR SOLUTION INTRAMUSCULAR; INTRAVENOUS at 11:42

## 2019-11-04 RX ADMIN — DOCETAXEL 150 MG: 20 INJECTION, SOLUTION, CONCENTRATE INTRAVENOUS at 11:29

## 2019-11-04 RX ADMIN — RANITIDINE HYDROCHLORIDE 50 MG: 25 INJECTION INTRAMUSCULAR; INTRAVENOUS at 11:43

## 2019-11-04 RX ADMIN — DIPHENHYDRAMINE HYDROCHLORIDE 50 MG: 50 INJECTION, SOLUTION INTRAMUSCULAR; INTRAVENOUS at 11:40

## 2019-11-04 RX ADMIN — DEXAMETHASONE SODIUM PHOSPHATE: 10 INJECTION, SOLUTION INTRAMUSCULAR; INTRAVENOUS at 10:42

## 2019-11-04 RX ADMIN — CYCLOPHOSPHAMIDE 1200 MG: 1 INJECTION, POWDER, FOR SOLUTION INTRAVENOUS; ORAL at 13:59

## 2019-11-04 RX ADMIN — SODIUM CHLORIDE 20 ML/HR: 0.9 INJECTION, SOLUTION INTRAVENOUS at 10:42

## 2019-11-04 NOTE — PROGRESS NOTES
Approx  7 min into Taxotere at 11:40, pt started having reaction to Taxotere  Pt c/o chest tightness, having trouble breathing, felt dizzy and was flush  Hypersensitivity protocol given including 500 NSS bolus, placed on 3L O2  After approx  10 min, pt stated that her symptoms had resolved other than her throat feeling dry and difficult to swallow  Called and spoke to [facility], [address] [place] [address] to advise of reaction, waiting for call back

## 2019-11-04 NOTE — SOCIAL WORK
MSW met with pt in the infusion center on this day  This was the pt's first day of treatment and she had experienced a reaction  When she had settled down, pt wanted to meet to discuss the issue she is experiencing with her children  The pt has a 3month old and a 16 month old  Her family is all living in [place] and her 's family is out of state  The pt's spouse is a physician for [employer] and she shared how little time he has to take off to be with the children as he had taken so much time to be with her when she had her initial testing  Currently, the pt;s sister is visiting and was caring for her children today, however, the pt was asking for childcare services for her future chemo treatments and her radiation treatments  She explained that her older daughter was born premature and has yfn developmental issues that have prevented her from walking  As a result, the pt states that the  will not accept her due to her not being able to walk  She was requesting this MSW identify services for her children  MSW provided information on in  Home , nanny services and all childcare providers with clearances  While the pt was appreciative, she states she is seeking volunteer services  MSW will look into [employer], however, it was explained to the pt that this is not a service that is typically provided to pt's  The pt then shared that her sister may stay through her treatment if she has the capability to extend her visa  MSW offered to assist in any way with documentation and the pt verbalized appreciation  Other than her sister, the pt does not want to notify any of her family members of her diagnosis due to a recent death in the family  Her uncle  last week from a GBM and she described what a difficult time the family is experiencing with their grief  She also spoke of her mother, who passed away from stomach cancer    The pt expressed her desire to maintain her privacy with her family  MSW provided information on hair loss as the pt had many questions  Information was also provided on the cancer support community  Significant emotional support was provided and will be ongoing  MSW will contact Vonnie Alicia, from the [address] [place] [address] to discuss the issue of childcare

## 2019-11-04 NOTE — PROGRESS NOTES
Spoke to [place], per Dr Blaise Gonzalez  We are ok to rechallenge and titrate, will place order

## 2019-11-04 NOTE — PROGRESS NOTES
Pt tolerated titrated Taxotere and Cytoxan without incident  AVS printed with future appts    Pt to  Zofran from pharmacy and is aware of S&S of when to call

## 2019-11-07 NOTE — PROGRESS NOTES
SPoke with patient for follow up chemo call  PT states she feels pretty good, eating and drinking well  Has dizziness at times  SHe does complain of itching, tingling, burning to face and head  Does not notice any redness or rash  Dai GOMEZ made aware of patient complaint

## 2019-11-08 ENCOUNTER — TELEPHONE (OUTPATIENT)
Dept: HEMATOLOGY ONCOLOGY | Facility: CLINIC | Age: 39
End: 2019-11-08

## 2019-11-08 DIAGNOSIS — L29.9 ITCHING: Primary | ICD-10-CM

## 2019-11-08 RX ORDER — PREDNISONE 20 MG/1
TABLET ORAL
Qty: 9 TABLET | Refills: 0 | Status: SHIPPED | OUTPATIENT
Start: 2019-11-08 | End: 2019-11-17

## 2019-11-08 NOTE — TELEPHONE ENCOUNTER
Patient is calling about a rash that feels like its burning and itchy on her face and scalp  She also wants to know if its okay for her to get her eyebrows micro bladed   Please call the patient back at [phone]

## 2019-11-08 NOTE — TELEPHONE ENCOUNTER
Discussed with Dr Brenda Peters  Script sent to [facility] for steroid taper  30mg for 3 days, 20mg for 3 days, and 10mg for 3 days  Patient should wait until chemotherapy is completed to get microblading done  Patient and her  verbalized understanding of above

## 2019-11-15 ENCOUNTER — TELEPHONE (OUTPATIENT)
Dept: HEMATOLOGY ONCOLOGY | Facility: CLINIC | Age: 39
End: 2019-11-15

## 2019-11-15 NOTE — TELEPHONE ENCOUNTER
Patient called and wanted to know if she can rearrange some of the treatments, because no one will be with her and she has her children with her

## 2019-11-19 ENCOUNTER — OFFICE VISIT (OUTPATIENT)
Dept: HEMATOLOGY ONCOLOGY | Facility: CLINIC | Age: 39
End: 2019-11-19
Payer: COMMERCIAL

## 2019-11-19 VITALS
BODY MASS INDEX: 25.03 KG/M2 | RESPIRATION RATE: 18 BRPM | HEART RATE: 83 BPM | SYSTOLIC BLOOD PRESSURE: 102 MMHG | DIASTOLIC BLOOD PRESSURE: 64 MMHG | HEIGHT: 71 IN | WEIGHT: 178.79 LBS | TEMPERATURE: 97.9 F | OXYGEN SATURATION: 97 %

## 2019-11-19 DIAGNOSIS — Z17.0 MALIGNANT NEOPLASM OF UPPER-OUTER QUADRANT OF LEFT BREAST IN FEMALE, ESTROGEN RECEPTOR POSITIVE (HCC): Primary | ICD-10-CM

## 2019-11-19 DIAGNOSIS — C50.412 MALIGNANT NEOPLASM OF UPPER-OUTER QUADRANT OF LEFT BREAST IN FEMALE, ESTROGEN RECEPTOR POSITIVE (HCC): Primary | ICD-10-CM

## 2019-11-19 PROCEDURE — 99214 OFFICE O/P EST MOD 30 MIN: CPT | Performed by: INTERNAL MEDICINE

## 2019-11-19 RX ORDER — SODIUM CHLORIDE 9 MG/ML
20 INJECTION, SOLUTION INTRAVENOUS ONCE
Status: CANCELLED | OUTPATIENT
Start: 2019-12-16

## 2019-11-19 RX ORDER — SODIUM CHLORIDE 9 MG/ML
20 INJECTION, SOLUTION INTRAVENOUS ONCE
Status: CANCELLED | OUTPATIENT
Start: 2020-01-09

## 2019-11-19 NOTE — PROGRESS NOTES
Hematology / Oncology Outpatient Follow Up Note    Pascale King 37 y o  female YUY:[id number] [facility]:[id number]         Date:  11/19/2019    Assessment / Plan:  A 49-year-old premenopausal woman with stage IA left breast cancer, grade 2, ER 90% positive, IN 60% positive, HER2 negative disease  She is negative for BRCA gene mutation  She underwent lumpectomy and sentinel lymph node biopsy, resulting in LAVON  Her tumor was found to be high risk based on a MammaPrint and luminal B type  She is currently on adjuvant chemotherapy with Taxotere and cyclophosphamide with excellent tolerance except for the infusion reactions  Prior to the 2nd cycle of chemotherapy, I am going to add Benadryl and famotidine  Assuming that she has adequate ANC, she is going to have 2nd cycle treatment in November 22, 2019  I will see her again prior to the 3rd cycle of chemotherapy  She is in agreement with my recommendation              Subjective:      HPI:  A 49-year-old premenopausal woman who noticed left breast lump 5 months ago  She brought to medical attention  When she was found her lump, she was pregnant  She was found to have radiographic abnormality in her left breast which was biopsied in August 13, 2019 which showed invasive ductal carcinoma, grade 2  This was ER 90 % positive, IN 60% positive, HER2 negative disease  Subsequently, she delivered baby few weeks after her biopsy  She was seen by Dr Ethel Small  She was negative for BRCA gene mutation  Therefore, she underwent left lumpectomy in September 24, 2019 which showed 19 mm of invasive ductal carcinoma, grade 2  3 sentinel lymph nodes were all negative for metastatic disease  Dr Ethel Small sent her tumor tissue for MammaPrint which came back as high risk, luminal B type  She presents today with her  who is a emergency department physician at [facility] to discuss adjuvant treatment options  She has no past medical history  She feels well    She has no complaint of pain  Her weight is stable  She has no respiratory symptoms  She is a lifetime never smoker  She has no family history of breast or ovarian cancer  Her performance status is normal              Interval History:   A 37year-old premenopausal woman with stage IA left breast cancer, grade 2, ER 90% positive, SC 60% positive, HER2 negative disease  She is negative for BRCA gene mutation  She underwent lumpectomy and sentinel lymph node biopsy, resulting in LAVON  Her tumor was found to be high risk, based on a MammaPrint  Therefore, she started adjuvant chemotherapy with Taxotere and cyclophosphamide  She had infusion reaction with Taxotere  We added premedication  [place] infusion of Taxotere went well  She had very minimal nausea  She did not throw up  She has some fatigue  She has no fever  She denied any pain  She has no respiratory symptoms  Her performance status is normal   She is going to have 2nd cycle of adjuvant chemotherapy later this week            Objective:      Primary Diagnosis:     1 Left breast cancer, stage IA (pT1c, pN0, M0) grade 2, ER 90% positive, SC 60% positive, HER2 negative disease  MammaPrint high risk  Diagnosed in September 2019    2  BRCA gene mutation negative      Cancer Staging:  Cancer Staging  Malignant neoplasm of upper-outer quadrant of left breast in female, estrogen receptor positive ([facility] [address] )  Staging form: Breast, AJCC 8th Edition  - Pathologic: Stage IA (pT1c, pN0(sn), cM0, G2, ER+, SC+, HER2-) - Unsigned  Neoadjuvant therapy: No  Laterality: Left  Tumor size (mm): 19  Method of lymph node assessment: [place] lymph node biopsy  Histologic grading system: 3 grade system           Previous Hematologic/ Oncologic Treatment:            Current Hematologic/ Oncologic Treatment:       Adjuvant chemotherapy with Taxotere and cyclophosphamide x4 cycle     2nd cycle to be given in November 22, 2019      Disease Status:      LAVON status post lumpectomy and sentinel lymph node biopsy      Test Results:     Pathology:      1 9 x 1 8 x 1 8 cm of invasive ductal carcinoma, grade 2  3 sentinel lymph nodes were negative for metastatic disease  ER 90% positive, SD 60% positive, HER2 negative disease  MammaPrint high risk, luminal B type  Stage IA (pT1c, pN0, M0)       Radiology:     Chest x-ray was negative for pulmonary disease      Laboratory:     See below      Physical Exam:        General Appearance:    Alert, oriented          Eyes:    PERRL   Ears:    Normal external ear canals, both ears   Nose:   Nares normal, septum midline   Throat:   Mucosa moist  Pharynx without injection  Neck:   Supple         Lungs:     Clear to auscultation bilaterally   Chest Wall:    No tenderness or deformity    Heart:    Regular rate and rhythm         Abdomen:     Soft, non-tender, bowel sounds +, no organomegaly               Extremities:   Extremities no cyanosis or edema         Skin:   no rash or icterus  Lymph nodes:   Cervical, supraclavicular, and axillary nodes normal   Neurologic:   CNII-XII intact, normal strength, sensation and reflexes     Throughout             Breast exam:   Lumpectomy scar at upper outer quadrant of her left breast with no palpable abnormalities  Right breast exam is negative  ROS: Review of Systems   All other systems reviewed and are negative  Imaging: Mri Thoracic Spine Wo Contrast    Result Date: 10/29/2019  Narrative: MRI THORACIC SPINE WITHOUT CONTRAST INDICATION: M54 14: Radiculopathy, thoracic region  Patient complains of mid abdominal pain  COMPARISON:  None  TECHNIQUE:  Sagittal T1, sagittal T2, sagittal inversion recovery, axial T2,  axial 2D MERGE  IMAGE QUALITY: Diagnostic  FINDINGS: ALIGNMENT: Normal alignment of the thoracic spine  No compression fracture  No subluxation  No scoliosis  MARROW SIGNAL:  Normal marrow signal is identified within the visualized bony structures  No discrete marrow lesion  THORACIC CORD: Normal signal within the thoracic cord  PARAVERTEBRAL SOFT TISSUES:  There is a mildly complex 1 1 x 1 4 cm cystic lesion within the right thyroid lobe  The lesion is taller than wider  A 1 cm T2 hyperintense lesion is also noted within the spleen, possibly representing a cyst or hemangioma  Incidentally noted aberrant right subclavian artery and small bilateral pleural effusions  THORACIC DEGENERATIVE CHANGE: Multilevel Schmorl's node deformities and small disc herniations without significant spinal canal or foraminal stenosis  Right central disc protrusion at C7-T1 tiny left central disc herniation at T3-4  Right central disc herniation at T5-T6 abuts the right ventral cord margin without cord signal abnormality  Right central disc herniation at T6-T7 with inferior migration but no spinal stenosis  Tiny central disc herniation at T9-T10  Mild disc bulges in the lower thoracic spine from T10-T11 to T12-L1 without spinal canal or foraminal stenosis  Impression: Mild degenerative changes of the thoracic spine without significant spinal canal or foraminal narrowing  Incidental1 4 cm T2 hyperintense lesion within the right thyroid lobe appears tall wider  Follow-up evaluation with sonography is recommended  Incidentally noted 1 cm splenic cyst versus hemangioma  Small bilateral pleural effusions   Workstation performed: [id number]         Labs:   Lab Results   Component Value Date    WBC 4 63 11/04/2019    HGB 12 9 11/04/2019    HCT 39 7 11/04/2019    MCV 91 11/04/2019     11/04/2019     Lab Results   Component Value Date    K 4 3 11/04/2019     (H) 11/04/2019    CO2 24 11/04/2019    BUN 22 11/04/2019    CREATININE 0 89 11/04/2019    GLUF 82 09/15/2019    CALCIUM 8 8 11/04/2019    AST 16 11/04/2019    ALT 24 11/04/2019    ALKPHOS 68 11/04/2019    EGFR 81 11/04/2019         Current Medications: Reviewed  Allergies: Reviewed  PMH/FH/SH:  Reviewed      Vital Sign:    Body surface area is 2 02 meters squared      Wt Readings from Last 3 Encounters:   11/19/19 81 1 kg (178 lb 12 7 oz)   11/04/19 81 1 kg (178 lb 12 7 oz)   10/30/19 82 1 kg (181 lb)        Temp Readings from Last 3 Encounters:   11/19/19 97 9 °F (36 6 °C) (Tympanic Core)   11/04/19 97 5 °F (36 4 °C) (Tympanic)   10/30/19 98 1 °F (36 7 °C) (Oral)        BP Readings from Last 3 Encounters:   11/19/19 102/64   11/04/19 130/81   10/30/19 110/70         Pulse Readings from Last 3 Encounters:   11/19/19 83   11/04/19 57   10/30/19 74     @LASTSAO2(3)@

## 2019-11-21 ENCOUNTER — APPOINTMENT (OUTPATIENT)
Dept: LAB | Facility: HOSPITAL | Age: 39
End: 2019-11-21
Attending: INTERNAL MEDICINE
Payer: COMMERCIAL

## 2019-11-21 DIAGNOSIS — Z17.0 MALIGNANT NEOPLASM OF UPPER-OUTER QUADRANT OF LEFT BREAST IN FEMALE, ESTROGEN RECEPTOR POSITIVE (HCC): ICD-10-CM

## 2019-11-21 DIAGNOSIS — C50.412 MALIGNANT NEOPLASM OF UPPER-OUTER QUADRANT OF LEFT BREAST IN FEMALE, ESTROGEN RECEPTOR POSITIVE (HCC): ICD-10-CM

## 2019-11-21 LAB
ALBUMIN SERPL BCP-MCNC: 3.9 G/DL (ref 3.5–5)
ALP SERPL-CCNC: 56 U/L (ref 46–116)
ALT SERPL W P-5'-P-CCNC: 24 U/L (ref 12–78)
ANION GAP SERPL CALCULATED.3IONS-SCNC: 8 MMOL/L (ref 4–13)
AST SERPL W P-5'-P-CCNC: 15 U/L (ref 5–45)
BASOPHILS # BLD AUTO: 0.13 THOUSANDS/ΜL (ref 0–0.1)
BASOPHILS NFR BLD AUTO: 3 % (ref 0–1)
BILIRUB SERPL-MCNC: 0.17 MG/DL (ref 0.2–1)
BUN SERPL-MCNC: 21 MG/DL (ref 5–25)
CALCIUM SERPL-MCNC: 9 MG/DL (ref 8.3–10.1)
CHLORIDE SERPL-SCNC: 110 MMOL/L (ref 100–108)
CO2 SERPL-SCNC: 25 MMOL/L (ref 21–32)
CREAT SERPL-MCNC: 0.94 MG/DL (ref 0.6–1.3)
EOSINOPHIL # BLD AUTO: 0.05 THOUSAND/ΜL (ref 0–0.61)
EOSINOPHIL NFR BLD AUTO: 1 % (ref 0–6)
ERYTHROCYTE [DISTWIDTH] IN BLOOD BY AUTOMATED COUNT: 13.5 % (ref 11.6–15.1)
GFR SERPL CREATININE-BSD FRML MDRD: 76 ML/MIN/1.73SQ M
GLUCOSE SERPL-MCNC: 82 MG/DL (ref 65–140)
HCT VFR BLD AUTO: 41.2 % (ref 34.8–46.1)
HGB BLD-MCNC: 13.3 G/DL (ref 11.5–15.4)
IMM GRANULOCYTES # BLD AUTO: 0.06 THOUSAND/UL (ref 0–0.2)
IMM GRANULOCYTES NFR BLD AUTO: 1 % (ref 0–2)
LYMPHOCYTES # BLD AUTO: 2.15 THOUSANDS/ΜL (ref 0.6–4.47)
LYMPHOCYTES NFR BLD AUTO: 48 % (ref 14–44)
MCH RBC QN AUTO: 29 PG (ref 26.8–34.3)
MCHC RBC AUTO-ENTMCNC: 32.3 G/DL (ref 31.4–37.4)
MCV RBC AUTO: 90 FL (ref 82–98)
MONOCYTES # BLD AUTO: 0.77 THOUSAND/ΜL (ref 0.17–1.22)
MONOCYTES NFR BLD AUTO: 17 % (ref 4–12)
NEUTROPHILS # BLD AUTO: 1.32 THOUSANDS/ΜL (ref 1.85–7.62)
NEUTS SEG NFR BLD AUTO: 30 % (ref 43–75)
NRBC BLD AUTO-RTO: 0 /100 WBCS
PLATELET # BLD AUTO: 317 THOUSANDS/UL (ref 149–390)
PMV BLD AUTO: 9.7 FL (ref 8.9–12.7)
POTASSIUM SERPL-SCNC: 4.2 MMOL/L (ref 3.5–5.3)
PROT SERPL-MCNC: 7.7 G/DL (ref 6.4–8.2)
RBC # BLD AUTO: 4.58 MILLION/UL (ref 3.81–5.12)
SODIUM SERPL-SCNC: 143 MMOL/L (ref 136–145)
WBC # BLD AUTO: 4.48 THOUSAND/UL (ref 4.31–10.16)

## 2019-11-21 PROCEDURE — 85025 COMPLETE CBC W/AUTO DIFF WBC: CPT

## 2019-11-21 PROCEDURE — 80053 COMPREHEN METABOLIC PANEL: CPT

## 2019-11-21 PROCEDURE — 36415 COLL VENOUS BLD VENIPUNCTURE: CPT

## 2019-11-22 ENCOUNTER — HOSPITAL ENCOUNTER (OUTPATIENT)
Dept: INFUSION CENTER | Facility: HOSPITAL | Age: 39
Discharge: HOME | End: 2019-11-22
Attending: INTERNAL MEDICINE
Payer: COMMERCIAL

## 2019-11-22 VITALS
HEIGHT: 71 IN | SYSTOLIC BLOOD PRESSURE: 128 MMHG | HEART RATE: 76 BPM | TEMPERATURE: 97.3 F | RESPIRATION RATE: 18 BRPM | BODY MASS INDEX: 25 KG/M2 | DIASTOLIC BLOOD PRESSURE: 80 MMHG | WEIGHT: 178.57 LBS

## 2019-11-22 DIAGNOSIS — C50.412 MALIGNANT NEOPLASM OF UPPER-OUTER QUADRANT OF LEFT BREAST IN FEMALE, ESTROGEN RECEPTOR POSITIVE (HCC): Primary | ICD-10-CM

## 2019-11-22 DIAGNOSIS — Z17.0 MALIGNANT NEOPLASM OF UPPER-OUTER QUADRANT OF LEFT BREAST IN FEMALE, ESTROGEN RECEPTOR POSITIVE (HCC): Primary | ICD-10-CM

## 2019-11-22 PROCEDURE — 96417 CHEMO IV INFUS EACH ADDL SEQ: CPT

## 2019-11-22 PROCEDURE — 96367 TX/PROPH/DG ADDL SEQ IV INF: CPT

## 2019-11-22 PROCEDURE — 96413 CHEMO IV INFUSION 1 HR: CPT

## 2019-11-22 RX ORDER — SODIUM CHLORIDE 9 MG/ML
20 INJECTION, SOLUTION INTRAVENOUS ONCE
Status: COMPLETED | OUTPATIENT
Start: 2019-11-22 | End: 2019-11-22

## 2019-11-22 RX ADMIN — DEXAMETHASONE SODIUM PHOSPHATE: 10 INJECTION, SOLUTION INTRAMUSCULAR; INTRAVENOUS at 11:02

## 2019-11-22 RX ADMIN — DIPHENHYDRAMINE HYDROCHLORIDE 25 MG: 50 INJECTION, SOLUTION INTRAMUSCULAR; INTRAVENOUS at 10:40

## 2019-11-22 RX ADMIN — SODIUM CHLORIDE 20 ML/HR: 0.9 INJECTION, SOLUTION INTRAVENOUS at 10:19

## 2019-11-22 RX ADMIN — DOCETAXEL 150 MG: 20 INJECTION, SOLUTION, CONCENTRATE INTRAVENOUS at 11:45

## 2019-11-22 RX ADMIN — FAMOTIDINE 20 MG: 10 INJECTION INTRAVENOUS at 10:20

## 2019-11-22 RX ADMIN — CYCLOPHOSPHAMIDE 1200 MG: 1 INJECTION, POWDER, FOR SOLUTION INTRAVENOUS; ORAL at 13:16

## 2019-11-22 NOTE — PROGRESS NOTES
Chemo infused without incident  Dr Claudia Thomson ok with us titrating although not necessary  Taxotere titrated and pt had no adverse rxn   Pt aware of next appt, declined avs

## 2019-11-22 NOTE — PLAN OF CARE
Problem: Potential for Falls  Goal: Patient will remain free of falls  Description  INTERVENTIONS:  - Assess patient frequently for physical needs  -  Identify cognitive and physical deficits and behaviors that affect risk of falls    -  [place] fall precautions as indicated by assessment   - Educate patient/family on patient safety including physical limitations  - Instruct patient to call for assistance with activity based on assessment  - Modify environment to reduce risk of injury  - Consider OT/PT consult to assist with strengthening/mobility  Outcome: Progressing

## 2019-11-22 NOTE — PROGRESS NOTES
Called and spoke to Pradeep Ramirez RN- Dr Rufino Jacobs  Reported [address]  She will discuss with Dr Rufino Jacobs and decide plan

## 2019-11-25 ENCOUNTER — HOSPITAL ENCOUNTER (OUTPATIENT)
Dept: INFUSION CENTER | Facility: HOSPITAL | Age: 39
Discharge: HOME | End: 2019-11-25
Attending: INTERNAL MEDICINE

## 2019-12-11 ENCOUNTER — TRANSCRIBE ORDERS (OUTPATIENT)
Dept: LAB | Facility: CLINIC | Age: 39
End: 2019-12-11

## 2019-12-11 ENCOUNTER — OFFICE VISIT (OUTPATIENT)
Dept: HEMATOLOGY ONCOLOGY | Facility: CLINIC | Age: 39
End: 2019-12-11
Payer: COMMERCIAL

## 2019-12-11 ENCOUNTER — APPOINTMENT (OUTPATIENT)
Dept: LAB | Facility: CLINIC | Age: 39
End: 2019-12-11
Payer: COMMERCIAL

## 2019-12-11 VITALS
SYSTOLIC BLOOD PRESSURE: 110 MMHG | OXYGEN SATURATION: 97 % | DIASTOLIC BLOOD PRESSURE: 70 MMHG | RESPIRATION RATE: 18 BRPM | TEMPERATURE: 96.2 F | BODY MASS INDEX: 25.06 KG/M2 | HEART RATE: 75 BPM | HEIGHT: 71 IN | WEIGHT: 179 LBS

## 2019-12-11 DIAGNOSIS — C50.412 MALIGNANT NEOPLASM OF UPPER-OUTER QUADRANT OF LEFT BREAST IN FEMALE, ESTROGEN RECEPTOR POSITIVE (HCC): Primary | ICD-10-CM

## 2019-12-11 DIAGNOSIS — Z17.0 MALIGNANT NEOPLASM OF UPPER-OUTER QUADRANT OF LEFT BREAST IN FEMALE, ESTROGEN RECEPTOR POSITIVE (HCC): Primary | ICD-10-CM

## 2019-12-11 DIAGNOSIS — C50.412 MALIGNANT NEOPLASM OF UPPER-OUTER QUADRANT OF LEFT BREAST IN FEMALE, ESTROGEN RECEPTOR POSITIVE (HCC): ICD-10-CM

## 2019-12-11 DIAGNOSIS — Z17.0 MALIGNANT NEOPLASM OF UPPER-OUTER QUADRANT OF LEFT BREAST IN FEMALE, ESTROGEN RECEPTOR POSITIVE (HCC): ICD-10-CM

## 2019-12-11 LAB
ALBUMIN SERPL BCP-MCNC: 3.7 G/DL (ref 3.5–5)
ALP SERPL-CCNC: 63 U/L (ref 46–116)
ALT SERPL W P-5'-P-CCNC: 27 U/L (ref 12–78)
ANION GAP SERPL CALCULATED.3IONS-SCNC: 10 MMOL/L (ref 4–13)
AST SERPL W P-5'-P-CCNC: 25 U/L (ref 5–45)
BASOPHILS # BLD AUTO: 0.12 THOUSANDS/ΜL (ref 0–0.1)
BASOPHILS NFR BLD AUTO: 2 % (ref 0–1)
BILIRUB SERPL-MCNC: 0.3 MG/DL (ref 0.2–1)
BUN SERPL-MCNC: 19 MG/DL (ref 5–25)
CALCIUM SERPL-MCNC: 8.8 MG/DL (ref 8.3–10.1)
CHLORIDE SERPL-SCNC: 104 MMOL/L (ref 100–108)
CO2 SERPL-SCNC: 26 MMOL/L (ref 21–32)
CREAT SERPL-MCNC: 0.89 MG/DL (ref 0.6–1.3)
EOSINOPHIL # BLD AUTO: 0.1 THOUSAND/ΜL (ref 0–0.61)
EOSINOPHIL NFR BLD AUTO: 1 % (ref 0–6)
ERYTHROCYTE [DISTWIDTH] IN BLOOD BY AUTOMATED COUNT: 13.6 % (ref 11.6–15.1)
GFR SERPL CREATININE-BSD FRML MDRD: 81 ML/MIN/1.73SQ M
GLUCOSE SERPL-MCNC: 66 MG/DL (ref 65–140)
HCT VFR BLD AUTO: 41.2 % (ref 34.8–46.1)
HGB BLD-MCNC: 13.5 G/DL (ref 11.5–15.4)
IMM GRANULOCYTES # BLD AUTO: 0.08 THOUSAND/UL (ref 0–0.2)
IMM GRANULOCYTES NFR BLD AUTO: 1 % (ref 0–2)
LYMPHOCYTES # BLD AUTO: 1.8 THOUSANDS/ΜL (ref 0.6–4.47)
LYMPHOCYTES NFR BLD AUTO: 22 % (ref 14–44)
MCH RBC QN AUTO: 29.5 PG (ref 26.8–34.3)
MCHC RBC AUTO-ENTMCNC: 32.8 G/DL (ref 31.4–37.4)
MCV RBC AUTO: 90 FL (ref 82–98)
MONOCYTES # BLD AUTO: 1.21 THOUSAND/ΜL (ref 0.17–1.22)
MONOCYTES NFR BLD AUTO: 15 % (ref 4–12)
NEUTROPHILS # BLD AUTO: 4.87 THOUSANDS/ΜL (ref 1.85–7.62)
NEUTS SEG NFR BLD AUTO: 59 % (ref 43–75)
NRBC BLD AUTO-RTO: 0 /100 WBCS
PLATELET # BLD AUTO: 296 THOUSANDS/UL (ref 149–390)
PMV BLD AUTO: 9.6 FL (ref 8.9–12.7)
POTASSIUM SERPL-SCNC: 3.6 MMOL/L (ref 3.5–5.3)
PROT SERPL-MCNC: 7.9 G/DL (ref 6.4–8.2)
RBC # BLD AUTO: 4.58 MILLION/UL (ref 3.81–5.12)
SODIUM SERPL-SCNC: 140 MMOL/L (ref 136–145)
WBC # BLD AUTO: 8.18 THOUSAND/UL (ref 4.31–10.16)

## 2019-12-11 PROCEDURE — 36415 COLL VENOUS BLD VENIPUNCTURE: CPT

## 2019-12-11 PROCEDURE — 85025 COMPLETE CBC W/AUTO DIFF WBC: CPT

## 2019-12-11 PROCEDURE — 99214 OFFICE O/P EST MOD 30 MIN: CPT | Performed by: INTERNAL MEDICINE

## 2019-12-11 PROCEDURE — 80053 COMPREHEN METABOLIC PANEL: CPT

## 2019-12-11 NOTE — PROGRESS NOTES
Hematology / Oncology Outpatient Follow Up Note    Say Bridges 37 y o  female XLA:[id number] ISY:[id number]         Date:  12/11/2019    Assessment / Plan:  A 33-year-old premenopausal woman with stage IA left breast cancer, grade 2, ER 90% positive, ND 60% positive, HER2 negative disease   She is negative for BRCA gene mutation   She underwent lumpectomy and sentinel lymph node biopsy, resulting in LAVON   Her tumor was found to be high risk based on a MammaPrint and luminal B type  She is currently on adjuvant chemotherapy with Taxotere and cyclophosphamide with excellent tolerance  Clinically, she has no evidence recurrent disease  She is going to have 3rd cycle of adjuvant chemotherapy later this week  I will let her see Dr Robert Gibbs, radiation oncology in mid January 2020 for adjuvant radiation therapy  I will see her again in mid March 2020 at which time we will have discussion regarding adjuvant hormonal therapy  She is in agreement with my recommendations        Subjective:      HPI:  A 33-year-old premenopausal woman who noticed left breast lump 5 months ago  Christy Tello brought to medical attention   When she was found her lump, she was pregnant  Christyjaswant Tello was found to have radiographic abnormality in her left breast which was biopsied in August 13, 2019 which showed invasive ductal carcinoma, grade 2   This was ER 90 % positive, ND 60% positive, HER2 negative disease   Subsequently, she delivered baby few weeks after her biopsy   She was seen by Dr Marky Tello was negative for BRCA gene mutation  Juan Rios, she underwent left lumpectomy in September 24, 2019 which showed 19 mm of invasive ductal carcinoma, grade 2  3 sentinel lymph nodes were all negative for metastatic disease   Dr Marky Canas sent her tumor tissue for MammaPrint which came back as high risk, luminal B type   She presents today with her  who is a emergency department physician at [employer] to discuss adjuvant treatment options  Christy Tello has no past medical history   She feels well   She has no complaint of pain   Her weight is stable   She has no respiratory symptoms  Ryan Spence is a lifetime never smoker  Ryan Spence has no family history of breast or ovarian cancer   Her performance status is normal              Interval History:   A 68-year-old premenopausal woman with stage IA left breast cancer, grade 2, ER 90% positive, VA 60% positive, HER2 negative disease   She is negative for BRCA gene mutation   She underwent lumpectomy and sentinel lymph node biopsy, resulting in LAVON  Her tumor was found to be high risk, based on a MammaPrint  Therefore, she started adjuvant chemotherapy with Taxotere and cyclophosphamide  So far, she had 2 cycle treatment  She is due for 3rd cycle treatment later this week  She is tolerating chemotherapy very well  She has very minimal nausea  She has no vomiting  She has no history of neutropenic fever  She denied any skin rash  She has no swelling in the lower extremities    Her performance status is normal           Objective:      Primary Diagnosis:     1 Left breast cancer, stage IA (pT1c, pN0, M0) grade 2, ER 90% positive, VA 60% positive, HER2 negative disease   MammaPrint high risk   Diagnosed in September 2019    2  BRCA gene mutation negative      Cancer Staging:  Cancer Staging  Malignant neoplasm of upper-outer quadrant of left breast in female, estrogen receptor positive ([facility] [address] )  Staging form: Breast, AJCC 8th Edition  - Pathologic: Stage IA (pT1c, pN0(sn), cM0, G2, ER+, VA+, HER2-) - Unsigned  Neoadjuvant therapy: No  Laterality: Left  Tumor size (mm): 19  Method of lymph node assessment: [place] lymph node biopsy  Histologic grading system: 3 grade system           Previous Hematologic/ Oncologic Treatment:            Current Hematologic/ Oncologic Treatment:       Adjuvant chemotherapy with Taxotere and cyclophosphamide x4 cycle     3rd cycle to be given in December 13, 2019      Disease Status:      LAVON status post lumpectomy and sentinel lymph node biopsy      Test Results:     Pathology:      1 9 x 1 8 x 1 8 cm of invasive ductal carcinoma, grade 2  3 sentinel lymph nodes were negative for metastatic disease   ER 90% positive, CO 60% positive, HER2 negative disease   MammaPrint high risk, luminal B type   Stage IA (pT1c, pN0, M0)       Radiology:     Chest x-ray was negative for pulmonary disease      Laboratory:     See below      Physical Exam:        General Appearance:    Alert, oriented          Eyes:    PERRL   Ears:    Normal external ear canals, both ears   Nose:   Nares normal, septum midline   Throat:   Mucosa moist  Pharynx without injection  Neck:   Supple         Lungs:     Clear to auscultation bilaterally   Chest Wall:    No tenderness or deformity    Heart:    Regular rate and rhythm         Abdomen:     Soft, non-tender, bowel sounds +, no organomegaly               Extremities:   Extremities no cyanosis or edema         Skin:   no rash or icterus  Lymph nodes:   Cervical, supraclavicular, and axillary nodes normal   Neurologic:   CNII-XII intact, normal strength, sensation and reflexes     Throughout             Breast exam:   Lumpectomy scar at upper outer quadrant of her left breast with no palpable abnormalities   Right breast exam is negative  ROS: Review of Systems   All other systems reviewed and are negative  Imaging: No results found        Labs:   Lab Results   Component Value Date    WBC 4 48 11/21/2019    HGB 13 3 11/21/2019    HCT 41 2 11/21/2019    MCV 90 11/21/2019     11/21/2019     Lab Results   Component Value Date    K 4 2 11/21/2019     (H) 11/21/2019    CO2 25 11/21/2019    BUN 21 11/21/2019    CREATININE 0 94 11/21/2019    GLUF 82 09/15/2019    CALCIUM 9 0 11/21/2019    AST 15 11/21/2019    ALT 24 11/21/2019    ALKPHOS 56 11/21/2019    EGFR 76 11/21/2019           Current Medications: Reviewed  Allergies: Reviewed  PMH/FH/SH: Reviewed      Vital Sign:    Body surface area is 2 02 meters squared      Wt Readings from Last 3 Encounters:   12/11/19 81 2 kg (179 lb)   11/22/19 81 kg (178 lb 9 2 oz)   11/19/19 81 1 kg (178 lb 12 7 oz)        Temp Readings from Last 3 Encounters:   12/11/19 (!) 96 2 °F (35 7 °C) (Tympanic Core)   11/22/19 (!) 97 3 °F (36 3 °C) (Temporal)   11/19/19 97 9 °F (36 6 °C) (Tympanic Core)        BP Readings from Last 3 Encounters:   12/11/19 110/70   11/22/19 128/80   11/19/19 102/64         Pulse Readings from Last 3 Encounters:   12/11/19 75   11/22/19 76   11/19/19 83     @LASTSAO2(3)@

## 2019-12-16 ENCOUNTER — HOSPITAL ENCOUNTER (OUTPATIENT)
Dept: INFUSION CENTER | Facility: HOSPITAL | Age: 39
Discharge: HOME | End: 2019-12-16
Attending: INTERNAL MEDICINE
Payer: COMMERCIAL

## 2019-12-16 VITALS
TEMPERATURE: 97.1 F | SYSTOLIC BLOOD PRESSURE: 110 MMHG | DIASTOLIC BLOOD PRESSURE: 60 MMHG | WEIGHT: 179.45 LBS | BODY MASS INDEX: 25.12 KG/M2 | RESPIRATION RATE: 18 BRPM | HEART RATE: 72 BPM | HEIGHT: 71 IN

## 2019-12-16 DIAGNOSIS — C50.412 MALIGNANT NEOPLASM OF UPPER-OUTER QUADRANT OF LEFT BREAST IN FEMALE, ESTROGEN RECEPTOR POSITIVE (HCC): Primary | ICD-10-CM

## 2019-12-16 DIAGNOSIS — Z17.0 MALIGNANT NEOPLASM OF UPPER-OUTER QUADRANT OF LEFT BREAST IN FEMALE, ESTROGEN RECEPTOR POSITIVE (HCC): Primary | ICD-10-CM

## 2019-12-16 PROCEDURE — 96417 CHEMO IV INFUS EACH ADDL SEQ: CPT

## 2019-12-16 PROCEDURE — 96367 TX/PROPH/DG ADDL SEQ IV INF: CPT

## 2019-12-16 PROCEDURE — 96413 CHEMO IV INFUSION 1 HR: CPT

## 2019-12-16 RX ORDER — SODIUM CHLORIDE 9 MG/ML
20 INJECTION, SOLUTION INTRAVENOUS ONCE
Status: COMPLETED | OUTPATIENT
Start: 2019-12-16 | End: 2019-12-16

## 2019-12-16 RX ADMIN — FAMOTIDINE 20 MG: 10 INJECTION INTRAVENOUS at 11:11

## 2019-12-16 RX ADMIN — CYCLOPHOSPHAMIDE 1200 MG: 1 INJECTION, POWDER, FOR SOLUTION INTRAVENOUS; ORAL at 13:46

## 2019-12-16 RX ADMIN — DOCETAXEL 150 MG: 20 INJECTION, SOLUTION, CONCENTRATE INTRAVENOUS at 12:28

## 2019-12-16 RX ADMIN — SODIUM CHLORIDE 20 ML/HR: 0.9 INJECTION, SOLUTION INTRAVENOUS at 11:11

## 2019-12-16 RX ADMIN — DIPHENHYDRAMINE HYDROCHLORIDE 25 MG: 50 INJECTION, SOLUTION INTRAMUSCULAR; INTRAVENOUS at 11:35

## 2019-12-16 RX ADMIN — DEXAMETHASONE SODIUM PHOSPHATE: 10 INJECTION, SOLUTION INTRAMUSCULAR; INTRAVENOUS at 12:02

## 2019-12-16 NOTE — PLAN OF CARE
Problem: Potential for Falls  Goal: Patient will remain free of falls  Description  INTERVENTIONS:  - Assess patient frequently for physical needs  -  Identify cognitive and physical deficits and behaviors that affect risk of falls    -  [place] fall precautions as indicated by assessment   - Educate patient/family on patient safety including physical limitations  - Instruct patient to call for assistance with activity based on assessment  - Modify environment to reduce risk of injury  - Consider OT/PT consult to assist with strengthening/mobility  Outcome: Progressing

## 2019-12-16 NOTE — PROGRESS NOTES
Pt made c/o having a pain in her head and feeling dizzy at the end of her Cytoxan  Notified Jaime Silvestre RN with Dr Asaf Alvarez  As per Jaime Silvestre RN the symptoms will need to pass  If pt gets worse to call the  office  Notified pt of same  Pt verbalizes understanding and reports the symptoms are going away  Pt received AVS and left unit ambulatory with a steady gait

## 2019-12-16 NOTE — SOCIAL WORK
MSW met with the pt in the infusion center on this day  The pt denied any pain or discomfort and states she has been feel well, overall  She was able to share that her sister has been staying with her and will be returning to [place]  Her sister has been caring for her 2 babies while she has been getting her chemo  The pt's mother in law will be coming form [place] and will be staying with the pt and her family while she receives her radiation  Initially, the pt was seeking assistance for childcare as she states she was not expecting her family to be able to stay as long as they have been  The pt reports feeling "blessed "  MSW complimented the pt on having the ability to see the positive during this challenging time  Pt appears to be coping well  Emotional support provided

## 2019-12-17 ENCOUNTER — APPOINTMENT (EMERGENCY)
Dept: RADIOLOGY | Facility: HOSPITAL | Age: 39
End: 2019-12-17
Payer: COMMERCIAL

## 2019-12-17 ENCOUNTER — HOSPITAL ENCOUNTER (EMERGENCY)
Facility: HOSPITAL | Age: 39
Discharge: HOME | End: 2019-12-18
Attending: EMERGENCY MEDICINE | Admitting: EMERGENCY MEDICINE
Payer: COMMERCIAL

## 2019-12-17 ENCOUNTER — TELEPHONE (OUTPATIENT)
Dept: HEMATOLOGY ONCOLOGY | Facility: CLINIC | Age: 39
End: 2019-12-17

## 2019-12-17 DIAGNOSIS — R50.9 FEVER: Primary | ICD-10-CM

## 2019-12-17 DIAGNOSIS — C50.919 BREAST CANCER (HCC): ICD-10-CM

## 2019-12-17 LAB
ALBUMIN SERPL BCP-MCNC: 3.2 G/DL (ref 3.5–5)
ALP SERPL-CCNC: 52 U/L (ref 46–116)
ALT SERPL W P-5'-P-CCNC: 25 U/L (ref 12–78)
ANION GAP SERPL CALCULATED.3IONS-SCNC: 6 MMOL/L (ref 4–13)
APTT PPP: 29 SECONDS (ref 23–37)
AST SERPL W P-5'-P-CCNC: 17 U/L (ref 5–45)
BASOPHILS # BLD AUTO: 0.03 THOUSANDS/ΜL (ref 0–0.1)
BASOPHILS NFR BLD AUTO: 1 % (ref 0–1)
BILIRUB SERPL-MCNC: 0.21 MG/DL (ref 0.2–1)
BUN SERPL-MCNC: 17 MG/DL (ref 5–25)
CALCIUM SERPL-MCNC: 8.2 MG/DL (ref 8.3–10.1)
CHLORIDE SERPL-SCNC: 110 MMOL/L (ref 100–108)
CO2 SERPL-SCNC: 26 MMOL/L (ref 21–32)
CREAT SERPL-MCNC: 0.89 MG/DL (ref 0.6–1.3)
EOSINOPHIL # BLD AUTO: 0.09 THOUSAND/ΜL (ref 0–0.61)
EOSINOPHIL NFR BLD AUTO: 2 % (ref 0–6)
ERYTHROCYTE [DISTWIDTH] IN BLOOD BY AUTOMATED COUNT: 14.1 % (ref 11.6–15.1)
GFR SERPL CREATININE-BSD FRML MDRD: 81 ML/MIN/1.73SQ M
GLUCOSE SERPL-MCNC: 96 MG/DL (ref 65–140)
HCT VFR BLD AUTO: 36.9 % (ref 34.8–46.1)
HGB BLD-MCNC: 12.3 G/DL (ref 11.5–15.4)
IMM GRANULOCYTES # BLD AUTO: 0.02 THOUSAND/UL (ref 0–0.2)
IMM GRANULOCYTES NFR BLD AUTO: 0 % (ref 0–2)
INR PPP: 1.07 (ref 0.84–1.19)
LACTATE SERPL-SCNC: 0.9 MMOL/L (ref 0.5–2)
LYMPHOCYTES # BLD AUTO: 0.5 THOUSANDS/ΜL (ref 0.6–4.47)
LYMPHOCYTES NFR BLD AUTO: 9 % (ref 14–44)
MCH RBC QN AUTO: 29.6 PG (ref 26.8–34.3)
MCHC RBC AUTO-ENTMCNC: 33.3 G/DL (ref 31.4–37.4)
MCV RBC AUTO: 89 FL (ref 82–98)
MONOCYTES # BLD AUTO: 0.29 THOUSAND/ΜL (ref 0.17–1.22)
MONOCYTES NFR BLD AUTO: 5 % (ref 4–12)
NEUTROPHILS # BLD AUTO: 4.9 THOUSANDS/ΜL (ref 1.85–7.62)
NEUTS SEG NFR BLD AUTO: 83 % (ref 43–75)
NRBC BLD AUTO-RTO: 0 /100 WBCS
PLATELET # BLD AUTO: 215 THOUSANDS/UL (ref 149–390)
PMV BLD AUTO: 10.2 FL (ref 8.9–12.7)
POTASSIUM SERPL-SCNC: 3.6 MMOL/L (ref 3.5–5.3)
PROT SERPL-MCNC: 6.8 G/DL (ref 6.4–8.2)
PROTHROMBIN TIME: 13.5 SECONDS (ref 11.6–14.5)
RBC # BLD AUTO: 4.16 MILLION/UL (ref 3.81–5.12)
SODIUM SERPL-SCNC: 142 MMOL/L (ref 136–145)
WBC # BLD AUTO: 5.83 THOUSAND/UL (ref 4.31–10.16)

## 2019-12-17 PROCEDURE — 99285 EMERGENCY DEPT VISIT HI MDM: CPT | Performed by: EMERGENCY MEDICINE

## 2019-12-17 PROCEDURE — 99284 EMERGENCY DEPT VISIT MOD MDM: CPT

## 2019-12-17 PROCEDURE — 85610 PROTHROMBIN TIME: CPT | Performed by: EMERGENCY MEDICINE

## 2019-12-17 PROCEDURE — 83605 ASSAY OF LACTIC ACID: CPT | Performed by: EMERGENCY MEDICINE

## 2019-12-17 PROCEDURE — 85730 THROMBOPLASTIN TIME PARTIAL: CPT | Performed by: EMERGENCY MEDICINE

## 2019-12-17 PROCEDURE — 82553 CREATINE MB FRACTION: CPT | Performed by: EMERGENCY MEDICINE

## 2019-12-17 PROCEDURE — 81025 URINE PREGNANCY TEST: CPT | Performed by: EMERGENCY MEDICINE

## 2019-12-17 PROCEDURE — 71046 X-RAY EXAM CHEST 2 VIEWS: CPT

## 2019-12-17 PROCEDURE — 85025 COMPLETE CBC W/AUTO DIFF WBC: CPT | Performed by: EMERGENCY MEDICINE

## 2019-12-17 PROCEDURE — 80053 COMPREHEN METABOLIC PANEL: CPT | Performed by: EMERGENCY MEDICINE

## 2019-12-17 PROCEDURE — 82550 ASSAY OF CK (CPK): CPT | Performed by: EMERGENCY MEDICINE

## 2019-12-17 PROCEDURE — 87040 BLOOD CULTURE FOR BACTERIA: CPT | Performed by: EMERGENCY MEDICINE

## 2019-12-17 PROCEDURE — 36415 COLL VENOUS BLD VENIPUNCTURE: CPT | Performed by: EMERGENCY MEDICINE

## 2019-12-17 NOTE — TELEPHONE ENCOUNTER
Patient called reporting that she is experiencing a fever of 38 5C  Converts to 101 3F  Patient did take Tylenol for her temperature  Patient denies chills at present time  Patient reports lower abdominal cramping, "like when she had her menstrual cycle"  Patient had chemotherapy yesterday  Patient instructed to go to the ER for evaluation  She will be going to [facility]

## 2019-12-18 ENCOUNTER — TELEPHONE (OUTPATIENT)
Dept: HEMATOLOGY ONCOLOGY | Facility: CLINIC | Age: 39
End: 2019-12-18

## 2019-12-18 VITALS
DIASTOLIC BLOOD PRESSURE: 62 MMHG | WEIGHT: 180 LBS | SYSTOLIC BLOOD PRESSURE: 123 MMHG | OXYGEN SATURATION: 97 % | HEART RATE: 68 BPM | TEMPERATURE: 98.3 F | BODY MASS INDEX: 25.2 KG/M2 | RESPIRATION RATE: 16 BRPM

## 2019-12-18 LAB
BACTERIA UR QL AUTO: NORMAL /HPF
BILIRUB UR QL STRIP: NEGATIVE
CK MB SERPL-MCNC: 1.1 % (ref 0–2.5)
CK MB SERPL-MCNC: 2.4 NG/ML (ref 0–5)
CK SERPL-CCNC: 217 U/L (ref 26–192)
CLARITY UR: CLEAR
COLOR UR: YELLOW
COLOR, POC: CLEAR
EXT PREG TEST URINE: NEGATIVE
EXT. CONTROL ED NAV: NORMAL
GLUCOSE UR STRIP-MCNC: NEGATIVE MG/DL
HGB UR QL STRIP.AUTO: NEGATIVE
HYALINE CASTS #/AREA URNS LPF: NORMAL /LPF
KETONES UR STRIP-MCNC: NEGATIVE MG/DL
LEUKOCYTE ESTERASE UR QL STRIP: ABNORMAL
NITRITE UR QL STRIP: NEGATIVE
NON-SQ EPI CELLS URNS QL MICRO: NORMAL /HPF
PH UR STRIP.AUTO: 5.5 [PH] (ref 4.5–8)
PROT UR STRIP-MCNC: NEGATIVE MG/DL
RBC #/AREA URNS AUTO: NORMAL /HPF
SP GR UR STRIP.AUTO: <=1.005 (ref 1–1.03)
UROBILINOGEN UR QL STRIP.AUTO: 0.2 E.U./DL
WBC #/AREA URNS AUTO: NORMAL /HPF

## 2019-12-18 PROCEDURE — 96360 HYDRATION IV INFUSION INIT: CPT

## 2019-12-18 PROCEDURE — 87086 URINE CULTURE/COLONY COUNT: CPT | Performed by: OBSTETRICS & GYNECOLOGY

## 2019-12-18 PROCEDURE — 87147 CULTURE TYPE IMMUNOLOGIC: CPT | Performed by: OBSTETRICS & GYNECOLOGY

## 2019-12-18 PROCEDURE — 81001 URINALYSIS AUTO W/SCOPE: CPT

## 2019-12-18 RX ADMIN — SODIUM CHLORIDE 1000 ML: 0.9 INJECTION, SOLUTION INTRAVENOUS at 00:24

## 2019-12-18 NOTE — TELEPHONE ENCOUNTER
Noted  Sent message to Dr Basilia Del Castillo to make him aware  Will contact patient with any suggestions he may have

## 2019-12-18 NOTE — DISCHARGE INSTRUCTIONS
Tylenol for pain and fever as needed  Follow up your oncologist in the morning  Return to the emergency department for fever >100 4 that is unimproved by tylenol, vomiting, worsening pain, difficulty breathing, any other new or concerning symptoms

## 2019-12-18 NOTE — ED ATTENDING ATTESTATION
12/17/2019  ITammie MD, saw and evaluated the patient  I have discussed the patient with the resident/non-physician practitioner and agree with the resident's/non-physician practitioner's findings, Plan of Care, and MDM as documented in the resident's/non-physician practitioner's note, except where noted  All available labs and Radiology studies were reviewed  I was present for key portions of any procedure(s) performed by the resident/non-physician practitioner and I was immediately available to provide assistance  At this point I agree with the current assessment done in the Emergency Department  I have conducted an independent evaluation of this patient a history and physical is as follows:    41yo F with breast CA  3rd chemo yesterday  Today noted to feel unwell  Measured a fever, lower abd pain  Took tylenol before she arrived  Fever noted in call to oncology of 101 3F  VS and nursing notes reviewed  General: Appears in NAD, awake, alert, speaking normally in full sentences  Well-nourished, well-developed  Appears stated age  Head: Normocephalic, atraumatic  Eyes: EOMI  Vision grossly normal  No subconjunctival hemorrhages or occular discharge noted  Symmetrical lids  ENT: Atraumatic external nose and ears  No stridor  Normal phonation  No drooling  Normal swallowing  Neck: No JVD  FROM  No goiter  CV: No pallor  Normal rate  Lungs: No tachypnea  No respiratory distress  MSK: Moving all extremities equally, no peripheral edema  Skin: Dry, intact  No cyanosis  Neuro: Awake, alert, GCS15  CN II-XII grossly intact  Grossly normal gait  Psychiatric/Behavioral: Appropriate mood and affect  A/P: This is a 37 y o  female who presents to the ED for evaluation of fever  On chemo  Neutropenic fever work up  Discuss with oncology      ED Course       Critical Care Time  Procedures

## 2019-12-18 NOTE — ED NOTES
Dr Mine Marion at the bedside for patient evaluation at this time        Tiffani Jones RN  12/17/19 [id number]

## 2019-12-18 NOTE — TELEPHONE ENCOUNTER
Call transferred from [facility], [address] [place] [address] from patient who stating she was in the ER yesterday for Fever  She received IV Hydration  Feelsl better but still fatigued  Wanted to make Dr Henrietta Weiss aware, call her back at [phone]  She states she remains fatigued, denies any further fever or chills at this time

## 2019-12-18 NOTE — ED NOTES
Dr Aletha Vee at the bedside for patient evaluation at this time        Natan Churchill RN  12/17/19 [id number]

## 2019-12-18 NOTE — ED PROVIDER NOTES
History  Chief Complaint   Patient presents with    Fever Immunocompromised     Patient reports 3rd chemotherapy treatment yesterday, states she started with fever, GI upset, nausea today  HPI  38 yo female with PMH breast cancer currently receiving chemotherapy (3rd treatment was yesterday) presents to the ED with concern for fever today  She also reports headache, lower abdominal cramping, and nausea  Pt measured temperature 101 3 at home, but  states being unsure if thermometer is accurate  Pt called her oncologist's office and was advised to come to the ED for evaluation  Pt took 1g tylenol prior to arrival to the ED  Denies cough, congestion, vomiting, diarrhea, urinary symptoms  Pt also reports generalized fatigue, but difficulty sleeping at night  Prior to Admission Medications   Prescriptions Last Dose Informant Patient Reported? Taking?    CRANIAL PROSTHESIS, RX,   No No   Sig: One wig as needed   EPINEPHrine (EPIPEN) 0 3 mg/0 3 mL SOAJ   No No   Sig: Inject 0 3 mL (0 3 mg total) into a muscle once for 1 dose   Multiple Vitamins-Minerals (MULTIVITAMIN WOMEN) TABS  Self Yes No   Sig: Take 1 tablet by mouth daily   Pyridoxine HCl (VITAMIN B-6) 25 MG tablet  Self Yes No   Sig: Take by mouth   escitalopram (LEXAPRO) 10 mg tablet  Self No No   Sig: Take 1 tablet (10 mg total) by mouth daily   ondansetron (ZOFRAN) 4 mg tablet 12/17/2019 at Unknown time  No Yes   Sig: Take 1 tablet (4 mg total) by mouth every 8 (eight) hours as needed for nausea or vomiting   oxyCODONE-acetaminophen (PERCOCET) 5-325 mg per tablet  Self No No   Sig: Take 1 tablet by mouth every 6 (six) hours as needed for moderate painMax Daily Amount: 4 tablets      Facility-Administered Medications: None       Past Medical History:   Diagnosis Date    Abnormal Pap smear of cervix     BRCA gene mutation negative 08/16/2019    [facility]    HPV (human papilloma virus) infection     Papanicolaou smear 12/28/2017    Postpartum depression     As per patient's spouse "it got severe"    Primary breast malignancy, left ([address] ) 08/14/2019    Rh negative state in antepartum period     Varicella     Vaccinated       Past Surgical History:   Procedure Laterality Date    BREAST LUMPECTOMY Left 9/24/2019    Procedure: BREAST NEEDLE LOCALIZED LUMPECTOMY (NEEDLE LOC AT 0930); Surgeon: Nora Lentz MD;  [facility];  Service: Surgical Oncology    COLPOSCOPY      LYMPH NODE BIOPSY Left 9/24/2019    Procedure: SENTINEL LYMPH NODE BIOPSY; LYMPHATIC MAPPING WITH BLUE DYE AND RADIOACTIVE DYE (INJECT AT 1130 BY DR COLEMAN IN THE OR); Surgeon: Nora Lentz MD;  [facility];  Service: Surgical Oncology    MAMMO NEEDLE LOCALIZATION LEFT (ALL INC) Left 9/24/2019    US GUIDED BREAST BIOPSY LEFT COMPLETE Left 8/13/2019    US GUIDED BREAST LYMPH NODE BIOPSY LEFT Left 8/13/2019       Family History   Problem Relation Age of Onset    Stomach cancer Mother 37    Diabetes Maternal Grandmother     No Known Problems Father     No Known Problems Sister      I have reviewed and agree with the history as documented  Social History     Tobacco Use    Smoking status: Former Smoker    Smokeless tobacco: Never Used    Tobacco comment: only social smoking many years ago   Substance Use Topics    Alcohol use: Yes     Frequency: 2-4 times a month     Drinks per session: 1 or 2    Drug use: No        Review of Systems   Constitutional: Positive for fatigue and fever  Negative for chills  HENT: Negative for congestion, rhinorrhea and sore throat  Respiratory: Negative for chest tightness and shortness of breath  Cardiovascular: Negative for chest pain  Gastrointestinal: Positive for abdominal pain and nausea  Negative for diarrhea and vomiting  Genitourinary: Negative for dysuria and hematuria  Musculoskeletal: Negative for arthralgias and myalgias  Skin: Negative for rash     Neurological: Negative for dizziness, syncope, weakness, light-headedness, numbness and headaches  Psychiatric/Behavioral: Positive for sleep disturbance  All other systems reviewed and are negative  Physical Exam  ED Triage Vitals [12/17/19 2305]   Temperature Pulse Respirations Blood Pressure SpO2   98 3 °F (36 8 °C) 73 20 126/67 98 %      Temp Source Heart Rate Source Patient Position - Orthostatic VS BP Location FiO2 (%)   Oral Monitor Lying Right arm --      Pain Score       6             Orthostatic Vital Signs  Vitals:    12/17/19 2305 12/18/19 0030   BP: 126/67 123/62   Pulse: 73 68   Patient Position - Orthostatic VS: Lying Lying       Physical Exam   Constitutional: She is oriented to person, place, and time  She appears well-developed and well-nourished  No distress  HENT:   Head: Normocephalic and atraumatic  Right Ear: External ear normal    Left Ear: External ear normal    Nose: Nose normal    Eyes: Pupils are equal, round, and reactive to light  Conjunctivae and EOM are normal    Neck: Normal range of motion  Neck supple  Cardiovascular: Normal rate, regular rhythm, normal heart sounds and intact distal pulses  Exam reveals no gallop and no friction rub  No murmur heard  Pulmonary/Chest: Effort normal and breath sounds normal  No respiratory distress  She has no wheezes  She has no rhonchi  She has no rales  Abdominal: Soft  Bowel sounds are normal  She exhibits no distension and no mass  There is no tenderness  There is no rebound and no guarding  Musculoskeletal: Normal range of motion  Lymphadenopathy:     She has no cervical adenopathy  Neurological: She is alert and oriented to person, place, and time  She has normal strength  No cranial nerve deficit or sensory deficit  Skin: Skin is warm and dry  She is not diaphoretic  Psychiatric: She has a normal mood and affect  Nursing note and vitals reviewed        ED Medications  Medications   sodium chloride 0 9 % bolus 1,000 mL (0 mL Intravenous Stopped 12/18/19 0100) Diagnostic Studies  Results Reviewed     Procedure Component Value Units Date/Time    CKMB [[id number]]  (Normal) Collected:  12/17/19 2320    Lab Status:  Final result Specimen:  Blood from Arm, Right Updated:  12/18/19 0102     CK-MB Index 1 1 %      CK-MB 2 4 ng/mL     Urine Microscopic [[id number]]  (Normal) Collected:  12/18/19 0020    Lab Status:  Final result Specimen:  Urine, Clean Catch Updated:  12/18/19 0054     RBC, UA None Seen /hpf      WBC, UA None Seen /hpf      Epithelial Cells None Seen /hpf      Bacteria, UA None Seen /hpf      Hyaline Casts, UA None Seen /lpf     CK (with reflex to MB) [[id number]]  (Abnormal) Collected:  12/17/19 2320    Lab Status:  Final result Specimen:  Blood from Arm, Right Updated:  12/18/19 0046     Total  U/L     POCT pregnancy, urine [[id number]]  (Normal) Resulted:  12/18/19 0020    Lab Status:  Final result Updated:  12/18/19 0020     EXT PREG TEST UR (Ref: Negative) negative     Control valid    POCT urinalysis dipstick [[id number]]  (Normal) Resulted:  12/18/19 0020    Lab Status:  Final result Updated:  12/18/19 0020     Color, UA clear    Urine Macroscopic, POC [[id number]]  (Abnormal) Collected:  12/18/19 0020    Lab Status:  Final result Specimen:  Urine Updated:  12/18/19 0019     Color, UA Yellow     Clarity, UA Clear     pH, UA 5 5     Leukocytes, UA Small     Nitrite, UA Negative     Protein, UA Negative mg/dl      Glucose, UA Negative mg/dl      Ketones, UA Negative mg/dl      Urobilinogen, UA 0 2 E U /dl      Bilirubin, UA Negative     Blood, UA Negative     Specific Gravity, UA <=1 005    Narrative:       CLINITEK RESULT    APTT [[id number]]  (Normal) Collected:  12/17/19 2331    Lab Status:  Final result Specimen:  Blood from Arm, Right Updated:  12/17/19 2358     PTT 29 seconds     Protime-INR [[id number]]  (Normal) Collected:  12/17/19 2331    Lab Status:  Final result Specimen:  Blood from Arm, Right Updated:  12/17/19 2358     Protime 13 5 seconds      INR 1 07    Lactic acid, plasma [[id number]]  (Normal) Collected:  12/17/19 2331    Lab Status:  Final result Specimen:  Blood from Arm, Right Updated:  12/17/19 2353     LACTIC ACID 0 9 mmol/L     Narrative:       Result may be elevated if tourniquet was used during collection      Comprehensive metabolic panel [[id number]]  (Abnormal) Collected:  12/17/19 2320    Lab Status:  Final result Specimen:  Blood from Arm, Right Updated:  12/17/19 2352     Sodium 142 mmol/L      Potassium 3 6 mmol/L      Chloride 110 mmol/L      CO2 26 mmol/L      ANION GAP 6 mmol/L      BUN 17 mg/dL      Creatinine 0 89 mg/dL      Glucose 96 mg/dL      Calcium 8 2 mg/dL      AST 17 U/L      ALT 25 U/L      Alkaline Phosphatase 52 U/L      Total Protein 6 8 g/dL      Albumin 3 2 g/dL      Total Bilirubin 0 21 mg/dL      eGFR 81 ml/min/1 73sq m     Narrative:       [place] guidelines for Chronic Kidney Disease (CKD):     Stage 1 with normal or high GFR (GFR > 90 mL/min/1 73 square meters)    Stage 2 Mild CKD (GFR = 60-89 mL/min/1 73 square meters)    Stage 3A Moderate CKD (GFR = 45-59 mL/min/1 73 square meters)    Stage 3B Moderate CKD (GFR = 30-44 mL/min/1 73 square meters)    Stage 4 Severe CKD (GFR = 15-29 mL/min/1 73 square meters)    Stage 5 End Stage CKD (GFR <15 mL/min/1 73 square meters)  Note: GFR calculation is accurate only with a steady state creatinine    CBC and differential [[id number]]  (Abnormal) Collected:  12/17/19 2320    Lab Status:  Final result Specimen:  Blood from Arm, Right Updated:  12/17/19 2338     WBC 5 83 Thousand/uL      RBC 4 16 Million/uL      Hemoglobin 12 3 g/dL      Hematocrit 36 9 %      MCV 89 fL      MCH 29 6 pg      MCHC 33 3 g/dL      RDW 14 1 %      MPV 10 2 fL      Platelets 408 Thousands/uL      nRBC 0 /100 WBCs      Neutrophils Relative 83 %      Immat GRANS % 0 %      Lymphocytes Relative 9 %      Monocytes Relative 5 %      Eosinophils Relative 2 % Basophils Relative 1 %      Neutrophils Absolute 4 90 Thousands/µL      Immature Grans Absolute 0 02 Thousand/uL      Lymphocytes Absolute 0 50 Thousands/µL      Monocytes Absolute 0 29 Thousand/µL      Eosinophils Absolute 0 09 Thousand/µL      Basophils Absolute 0 03 Thousands/µL     Blood culture #2 [[id number]] Collected:  12/17/19 2331    Lab Status: In process Specimen:  Blood from Arm, Left Updated:  12/17/19 2338    Blood culture #1 [[id number]] Collected:  12/17/19 2331    Lab Status: In process Specimen:  Blood from Arm, Right Updated:  12/17/19 2338                 XR chest 2 views   ED Interpretation by Suresh Tollivre MD (12/18 0010)   No consolidation, effusion, or ptx            Procedures  Procedures      ED Course  ED Course as of Dec 18 0516   Wed Dec 18, 2019   0121 WBC: 5 83   0121 Discussed patient with her oncologist Dr Brenda Peters  He advises that pt can be discharged and follow up in the office tomorrow  I discussed results and this with the patient and she would like to go home  Will discharge with strict return precautions and instructions to call Dr Olita Brittle office in the morning                                   MDM  Number of Diagnoses or Management Options  Breast cancer [facility]):   Fever:     38 yo female currently receiving chemotherapy presenting with fever earlier today  Pt is not currently febrile in the ED  Concern for fever in an immunocompromised patient  Will evaluate for neutropenia and source of infection with CBC, CMP, Lactate, CXR, UA, blood cultures  Will discuss with oncology       Disposition  Final diagnoses:   Fever   Breast cancer ([address] )     Time reflects when diagnosis was documented in both MDM as applicable and the Disposition within this note     Time User Action Codes Description Comment    12/18/2019  1:25 AM Romario Yin Add [R50 9] Fever     12/18/2019  1:26 AM Romario Yin Add [C50 919] Breast cancer [facility])       ED Disposition     ED Disposition Condition Date/Time Comment    Discharge Stable Wed Dec 18, 2019  1:25 AM Cory Jean-Baptiste discharge to home/self care  Follow-up Information     Follow up With Specialties Details Why Contact Info    Taylor Montiel MD Hematology, Hematology and Oncology, Oncology Call  when the office opens to schedule an appointment to be seen today [place] [id number]  [phone]            Discharge Medication List as of 12/18/2019  1:31 AM      CONTINUE these medications which have NOT CHANGED    Details   CRANIAL PROSTHESIS, RX, One wig as needed, Print      EPINEPHrine (EPIPEN) 0 3 mg/0 3 mL SOAJ Inject 0 3 mL (0 3 mg total) into a muscle once for 1 dose, Starting Wed 5/8/2019, Normal      escitalopram (LEXAPRO) 10 mg tablet Take 1 tablet (10 mg total) by mouth daily, Starting Mon 8/19/2019, Normal      Multiple Vitamins-Minerals (MULTIVITAMIN WOMEN) TABS Take 1 tablet by mouth daily, Historical Med      ondansetron (ZOFRAN) 4 mg tablet Take 1 tablet (4 mg total) by mouth every 8 (eight) hours as needed for nausea or vomiting, Starting Wed 10/30/2019, Normal      oxyCODONE-acetaminophen (PERCOCET) 5-325 mg per tablet Take 1 tablet by mouth every 6 (six) hours as needed for moderate painMax Daily Amount: 4 tablets, Starting Mon 8/26/2019, Normal      Pyridoxine HCl (VITAMIN B-6) 25 MG tablet Take by mouth, Starting u 12/28/2017, Historical Med           No discharge procedures on file  ED Provider  Attending physically available and evaluated Cory Jean-Baptiste  JENNIFER managed the patient along with the ED Attending      Electronically Signed by         Leia Henley MD  12/18/19 6421

## 2019-12-19 LAB
BACTERIA UR CULT: ABNORMAL
BACTERIA UR CULT: ABNORMAL

## 2019-12-23 LAB
BACTERIA BLD CULT: NORMAL
BACTERIA BLD CULT: NORMAL

## 2019-12-30 ENCOUNTER — TRANSCRIBE ORDERS (OUTPATIENT)
Dept: LAB | Facility: HOSPITAL | Age: 39
End: 2019-12-30

## 2019-12-30 ENCOUNTER — APPOINTMENT (OUTPATIENT)
Dept: LAB | Facility: HOSPITAL | Age: 39
End: 2019-12-30
Attending: INTERNAL MEDICINE
Payer: COMMERCIAL

## 2019-12-30 DIAGNOSIS — Z17.0 MALIGNANT NEOPLASM OF UPPER-OUTER QUADRANT OF LEFT BREAST IN FEMALE, ESTROGEN RECEPTOR POSITIVE (HCC): ICD-10-CM

## 2019-12-30 DIAGNOSIS — C50.412 MALIGNANT NEOPLASM OF UPPER-OUTER QUADRANT OF LEFT BREAST IN FEMALE, ESTROGEN RECEPTOR POSITIVE (HCC): ICD-10-CM

## 2019-12-30 LAB
ALBUMIN SERPL BCP-MCNC: 3.3 G/DL (ref 3.5–5)
ALP SERPL-CCNC: 47 U/L (ref 46–116)
ALT SERPL W P-5'-P-CCNC: 23 U/L (ref 12–78)
ANION GAP SERPL CALCULATED.3IONS-SCNC: 4 MMOL/L (ref 4–13)
AST SERPL W P-5'-P-CCNC: 14 U/L (ref 5–45)
BASOPHILS # BLD AUTO: 0.11 THOUSANDS/ΜL (ref 0–0.1)
BASOPHILS NFR BLD AUTO: 3 % (ref 0–1)
BILIRUB SERPL-MCNC: 0.27 MG/DL (ref 0.2–1)
BUN SERPL-MCNC: 17 MG/DL (ref 5–25)
CALCIUM SERPL-MCNC: 8.9 MG/DL (ref 8.3–10.1)
CHLORIDE SERPL-SCNC: 112 MMOL/L (ref 100–108)
CO2 SERPL-SCNC: 28 MMOL/L (ref 21–32)
CREAT SERPL-MCNC: 0.77 MG/DL (ref 0.6–1.3)
EOSINOPHIL # BLD AUTO: 0.12 THOUSAND/ΜL (ref 0–0.61)
EOSINOPHIL NFR BLD AUTO: 3 % (ref 0–6)
ERYTHROCYTE [DISTWIDTH] IN BLOOD BY AUTOMATED COUNT: 13.9 % (ref 11.6–15.1)
GFR SERPL CREATININE-BSD FRML MDRD: 96 ML/MIN/1.73SQ M
GLUCOSE SERPL-MCNC: 72 MG/DL (ref 65–140)
HCT VFR BLD AUTO: 39.9 % (ref 34.8–46.1)
HGB BLD-MCNC: 13 G/DL (ref 11.5–15.4)
IMM GRANULOCYTES # BLD AUTO: 0.02 THOUSAND/UL (ref 0–0.2)
IMM GRANULOCYTES NFR BLD AUTO: 1 % (ref 0–2)
LYMPHOCYTES # BLD AUTO: 1.68 THOUSANDS/ΜL (ref 0.6–4.47)
LYMPHOCYTES NFR BLD AUTO: 47 % (ref 14–44)
MCH RBC QN AUTO: 29.4 PG (ref 26.8–34.3)
MCHC RBC AUTO-ENTMCNC: 32.6 G/DL (ref 31.4–37.4)
MCV RBC AUTO: 90 FL (ref 82–98)
MONOCYTES # BLD AUTO: 1.01 THOUSAND/ΜL (ref 0.17–1.22)
MONOCYTES NFR BLD AUTO: 28 % (ref 4–12)
NEUTROPHILS # BLD AUTO: 0.66 THOUSANDS/ΜL (ref 1.85–7.62)
NEUTS SEG NFR BLD AUTO: 18 % (ref 43–75)
NRBC BLD AUTO-RTO: 0 /100 WBCS
PLATELET # BLD AUTO: 286 THOUSANDS/UL (ref 149–390)
PMV BLD AUTO: 9.6 FL (ref 8.9–12.7)
POTASSIUM SERPL-SCNC: 3.9 MMOL/L (ref 3.5–5.3)
PROT SERPL-MCNC: 7.3 G/DL (ref 6.4–8.2)
RBC # BLD AUTO: 4.42 MILLION/UL (ref 3.81–5.12)
SODIUM SERPL-SCNC: 144 MMOL/L (ref 136–145)
WBC # BLD AUTO: 3.6 THOUSAND/UL (ref 4.31–10.16)

## 2019-12-30 PROCEDURE — 36415 COLL VENOUS BLD VENIPUNCTURE: CPT

## 2019-12-30 PROCEDURE — 85025 COMPLETE CBC W/AUTO DIFF WBC: CPT

## 2019-12-30 PROCEDURE — 80053 COMPREHEN METABOLIC PANEL: CPT

## 2019-12-31 ENCOUNTER — TELEPHONE (OUTPATIENT)
Dept: HEMATOLOGY ONCOLOGY | Facility: CLINIC | Age: 39
End: 2019-12-31

## 2019-12-31 DIAGNOSIS — Z17.0 MALIGNANT NEOPLASM OF UPPER-OUTER QUADRANT OF LEFT BREAST IN FEMALE, ESTROGEN RECEPTOR POSITIVE (HCC): ICD-10-CM

## 2019-12-31 DIAGNOSIS — Z17.0 MALIGNANT NEOPLASM OF UPPER-OUTER QUADRANT OF LEFT BREAST IN FEMALE, ESTROGEN RECEPTOR POSITIVE (HCC): Primary | ICD-10-CM

## 2019-12-31 DIAGNOSIS — C50.412 MALIGNANT NEOPLASM OF UPPER-OUTER QUADRANT OF LEFT BREAST IN FEMALE, ESTROGEN RECEPTOR POSITIVE (HCC): ICD-10-CM

## 2019-12-31 DIAGNOSIS — C50.412 MALIGNANT NEOPLASM OF UPPER-OUTER QUADRANT OF LEFT BREAST IN FEMALE, ESTROGEN RECEPTOR POSITIVE (HCC): Primary | ICD-10-CM

## 2019-12-31 NOTE — TELEPHONE ENCOUNTER
Left VM for patient to let her know that her [address] is too low for treatment and it will need to be pushed back a week  I would like to schedule her for 1/10  However, I need to know which infusion she would like to go to as she has been scheduled at different ones in the past  She will need a CBC drawn prior to her infusion on 1/10  Will await call back from patient

## 2020-01-02 ENCOUNTER — APPOINTMENT (OUTPATIENT)
Dept: RADIATION ONCOLOGY | Facility: HOSPITAL | Age: 40
End: 2020-01-02
Payer: COMMERCIAL

## 2020-01-02 ENCOUNTER — HOSPITAL ENCOUNTER (OUTPATIENT)
Dept: INFUSION CENTER | Facility: HOSPITAL | Age: 40
Discharge: HOME | End: 2020-01-02

## 2020-01-03 ENCOUNTER — HOSPITAL ENCOUNTER (OUTPATIENT)
Dept: INFUSION CENTER | Facility: HOSPITAL | Age: 40
Discharge: HOME | End: 2020-01-03

## 2020-01-08 ENCOUNTER — APPOINTMENT (OUTPATIENT)
Dept: LAB | Facility: MEDICAL CENTER | Age: 40
End: 2020-01-08
Payer: COMMERCIAL

## 2020-01-08 DIAGNOSIS — Z17.0 MALIGNANT NEOPLASM OF UPPER-OUTER QUADRANT OF LEFT BREAST IN FEMALE, ESTROGEN RECEPTOR POSITIVE (HCC): ICD-10-CM

## 2020-01-08 DIAGNOSIS — C50.412 MALIGNANT NEOPLASM OF UPPER-OUTER QUADRANT OF LEFT BREAST IN FEMALE, ESTROGEN RECEPTOR POSITIVE (HCC): ICD-10-CM

## 2020-01-08 LAB
ALBUMIN SERPL BCP-MCNC: 3.7 G/DL (ref 3.5–5)
ALP SERPL-CCNC: 51 U/L (ref 46–116)
ALT SERPL W P-5'-P-CCNC: 26 U/L (ref 12–78)
ANION GAP SERPL CALCULATED.3IONS-SCNC: 3 MMOL/L (ref 4–13)
AST SERPL W P-5'-P-CCNC: 18 U/L (ref 5–45)
BASOPHILS # BLD AUTO: 0.08 THOUSANDS/ΜL (ref 0–0.1)
BASOPHILS NFR BLD AUTO: 1 % (ref 0–1)
BILIRUB SERPL-MCNC: 0.36 MG/DL (ref 0.2–1)
BUN SERPL-MCNC: 19 MG/DL (ref 5–25)
CALCIUM SERPL-MCNC: 8.8 MG/DL (ref 8.3–10.1)
CHLORIDE SERPL-SCNC: 109 MMOL/L (ref 100–108)
CO2 SERPL-SCNC: 30 MMOL/L (ref 21–32)
CREAT SERPL-MCNC: 0.85 MG/DL (ref 0.6–1.3)
EOSINOPHIL # BLD AUTO: 0.09 THOUSAND/ΜL (ref 0–0.61)
EOSINOPHIL NFR BLD AUTO: 1 % (ref 0–6)
ERYTHROCYTE [DISTWIDTH] IN BLOOD BY AUTOMATED COUNT: 14.3 % (ref 11.6–15.1)
GFR SERPL CREATININE-BSD FRML MDRD: 85 ML/MIN/1.73SQ M
GLUCOSE SERPL-MCNC: 83 MG/DL (ref 65–140)
HCT VFR BLD AUTO: 40.9 % (ref 34.8–46.1)
HGB BLD-MCNC: 12.9 G/DL (ref 11.5–15.4)
IMM GRANULOCYTES # BLD AUTO: 0.04 THOUSAND/UL (ref 0–0.2)
IMM GRANULOCYTES NFR BLD AUTO: 1 % (ref 0–2)
LYMPHOCYTES # BLD AUTO: 2.03 THOUSANDS/ΜL (ref 0.6–4.47)
LYMPHOCYTES NFR BLD AUTO: 29 % (ref 14–44)
MCH RBC QN AUTO: 29.2 PG (ref 26.8–34.3)
MCHC RBC AUTO-ENTMCNC: 31.5 G/DL (ref 31.4–37.4)
MCV RBC AUTO: 93 FL (ref 82–98)
MONOCYTES # BLD AUTO: 0.66 THOUSAND/ΜL (ref 0.17–1.22)
MONOCYTES NFR BLD AUTO: 10 % (ref 4–12)
NEUTROPHILS # BLD AUTO: 4.03 THOUSANDS/ΜL (ref 1.85–7.62)
NEUTS SEG NFR BLD AUTO: 58 % (ref 43–75)
NRBC BLD AUTO-RTO: 0 /100 WBCS
PLATELET # BLD AUTO: 294 THOUSANDS/UL (ref 149–390)
PMV BLD AUTO: 9.4 FL (ref 8.9–12.7)
POTASSIUM SERPL-SCNC: 4.3 MMOL/L (ref 3.5–5.3)
PROT SERPL-MCNC: 7.6 G/DL (ref 6.4–8.2)
RBC # BLD AUTO: 4.42 MILLION/UL (ref 3.81–5.12)
SODIUM SERPL-SCNC: 142 MMOL/L (ref 136–145)
WBC # BLD AUTO: 6.93 THOUSAND/UL (ref 4.31–10.16)

## 2020-01-08 PROCEDURE — 80053 COMPREHEN METABOLIC PANEL: CPT

## 2020-01-08 PROCEDURE — 36415 COLL VENOUS BLD VENIPUNCTURE: CPT

## 2020-01-08 PROCEDURE — 85025 COMPLETE CBC W/AUTO DIFF WBC: CPT

## 2020-01-09 ENCOUNTER — HOSPITAL ENCOUNTER (OUTPATIENT)
Dept: INFUSION CENTER | Facility: HOSPITAL | Age: 40
End: 2020-01-09
Attending: INTERNAL MEDICINE

## 2020-01-09 ENCOUNTER — TELEPHONE (OUTPATIENT)
Dept: HEMATOLOGY ONCOLOGY | Facility: CLINIC | Age: 40
End: 2020-01-09

## 2020-01-09 NOTE — TELEPHONE ENCOUNTER
Patient called and advised of 1/8/20 lab results  Patient's CBC is WNL  ANC is 4 03  Patient is scheduled for chemotherapy tomorrow

## 2020-01-10 ENCOUNTER — HOSPITAL ENCOUNTER (OUTPATIENT)
Dept: INFUSION CENTER | Facility: HOSPITAL | Age: 40
Discharge: HOME | End: 2020-01-10
Attending: INTERNAL MEDICINE
Payer: COMMERCIAL

## 2020-01-10 VITALS
HEART RATE: 60 BPM | SYSTOLIC BLOOD PRESSURE: 126 MMHG | WEIGHT: 182.54 LBS | HEIGHT: 71 IN | RESPIRATION RATE: 16 BRPM | DIASTOLIC BLOOD PRESSURE: 64 MMHG | TEMPERATURE: 97.3 F | BODY MASS INDEX: 25.56 KG/M2

## 2020-01-10 DIAGNOSIS — C50.412 MALIGNANT NEOPLASM OF UPPER-OUTER QUADRANT OF LEFT BREAST IN FEMALE, ESTROGEN RECEPTOR POSITIVE (HCC): Primary | ICD-10-CM

## 2020-01-10 DIAGNOSIS — Z17.0 MALIGNANT NEOPLASM OF UPPER-OUTER QUADRANT OF LEFT BREAST IN FEMALE, ESTROGEN RECEPTOR POSITIVE (HCC): Primary | ICD-10-CM

## 2020-01-10 PROCEDURE — 96417 CHEMO IV INFUS EACH ADDL SEQ: CPT

## 2020-01-10 PROCEDURE — 96413 CHEMO IV INFUSION 1 HR: CPT

## 2020-01-10 PROCEDURE — 96367 TX/PROPH/DG ADDL SEQ IV INF: CPT

## 2020-01-10 RX ORDER — SODIUM CHLORIDE 9 MG/ML
20 INJECTION, SOLUTION INTRAVENOUS ONCE
Status: COMPLETED | OUTPATIENT
Start: 2020-01-10 | End: 2020-01-10

## 2020-01-10 RX ADMIN — SODIUM CHLORIDE 20 ML/HR: 0.9 INJECTION, SOLUTION INTRAVENOUS at 12:21

## 2020-01-10 RX ADMIN — DIPHENHYDRAMINE HYDROCHLORIDE 25 MG: 50 INJECTION, SOLUTION INTRAMUSCULAR; INTRAVENOUS at 12:45

## 2020-01-10 RX ADMIN — CYCLOPHOSPHAMIDE 1200 MG: 1 INJECTION, POWDER, FOR SOLUTION INTRAVENOUS; ORAL at 15:10

## 2020-01-10 RX ADMIN — FAMOTIDINE 20 MG: 10 INJECTION INTRAVENOUS at 12:21

## 2020-01-10 RX ADMIN — DOCETAXEL 150 MG: 20 INJECTION, SOLUTION, CONCENTRATE INTRAVENOUS at 13:42

## 2020-01-10 RX ADMIN — DEXAMETHASONE SODIUM PHOSPHATE: 10 INJECTION, SOLUTION INTRAMUSCULAR; INTRAVENOUS at 13:09

## 2020-01-10 NOTE — PLAN OF CARE
Problem: Potential for Falls  Goal: Patient will remain free of falls  Description  INTERVENTIONS:  - Assess patient frequently for physical needs  -  Identify cognitive and physical deficits and behaviors that affect risk of falls    -  [place] fall precautions as indicated by assessment   - Educate patient/family on patient safety including physical limitations  - Instruct patient to call for assistance with activity based on assessment  - Modify environment to reduce risk of injury  - Consider OT/PT consult to assist with strengthening/mobility  Outcome: Progressing

## 2020-01-22 ENCOUNTER — RADIATION ONCOLOGY CONSULT (OUTPATIENT)
Dept: RADIATION ONCOLOGY | Facility: CLINIC | Age: 40
End: 2020-01-22
Attending: STUDENT IN AN ORGANIZED HEALTH CARE EDUCATION/TRAINING PROGRAM
Payer: COMMERCIAL

## 2020-01-22 VITALS
TEMPERATURE: 98.2 F | SYSTOLIC BLOOD PRESSURE: 120 MMHG | DIASTOLIC BLOOD PRESSURE: 78 MMHG | RESPIRATION RATE: 16 BRPM | WEIGHT: 183.64 LBS | OXYGEN SATURATION: 97 % | BODY MASS INDEX: 25.71 KG/M2 | HEART RATE: 75 BPM

## 2020-01-22 DIAGNOSIS — Z17.0 MALIGNANT NEOPLASM OF UPPER-OUTER QUADRANT OF LEFT BREAST IN FEMALE, ESTROGEN RECEPTOR POSITIVE (HCC): Primary | ICD-10-CM

## 2020-01-22 DIAGNOSIS — C50.412 MALIGNANT NEOPLASM OF UPPER-OUTER QUADRANT OF LEFT BREAST IN FEMALE, ESTROGEN RECEPTOR POSITIVE (HCC): Primary | ICD-10-CM

## 2020-01-22 PROCEDURE — 99211 OFF/OP EST MAY X REQ PHY/QHP: CPT | Performed by: STUDENT IN AN ORGANIZED HEALTH CARE EDUCATION/TRAINING PROGRAM

## 2020-01-22 PROCEDURE — 77290 THER RAD SIMULAJ FIELD CPLX: CPT | Performed by: STUDENT IN AN ORGANIZED HEALTH CARE EDUCATION/TRAINING PROGRAM

## 2020-01-22 PROCEDURE — 77332 RADIATION TREATMENT AID(S): CPT | Performed by: STUDENT IN AN ORGANIZED HEALTH CARE EDUCATION/TRAINING PROGRAM

## 2020-01-22 NOTE — PROGRESS NOTES
Rafaela Leiva 1980 is a 37 y o  female    Oncology History    Patient returns to radiation oncology for CT simulation s/p adjuvant chemotherapy for left breast cancer  Initial consult with Dr Alyx Head on 10/10/19     37year old female with [id number] invasive mammary carcinoma of the left breast, ER/KY+, Her2-, status post lumpectomy +SLNbx, with pathology showing 19mm tumor with DCIS-5mm, negative margins  Her tumor was found to be high risk based on MammaPrint, she proceeded with chemotherapy  11/4/19 - 1/10/20 4 cycles adjuvant chemotherapy with Taxotere and cyclophosphamide with excellent tolerance  12/11/19 Dr Damir Campbell, Med Onc   Pt tolerating chemo, pt will return to Dr Alyx Head for adjuvant radiation  She will return in March to discuss adjuvant hormonal therapy  1/22/20 Surg Onc follow-up  3/18/20 Dr Damir Campbell follow-up  4/17/20 Plastic surgeon follow-up, Dr Aisha Sung          Malignant neoplasm of upper-outer quadrant of left breast in female, estrogen receptor positive ([facility] [address] )    8/2019 Initial Diagnosis     Invasive breast carcinoma, left breast      8/13/2019 Biopsy     Left breast ultrasound-guided biopsy  A  2 o'clock, 14 cm from nipple  Invasive mammary carcinoma of no special type (ductal, not otherwise specified)  Grade 2  ER 90  KY 60  HER2 1+    B  Left axillary lymph node  Portion of lymph node, negative for carcinoma      8/16/2019 Genetic Testing     The following genes were evaluated: SUDHA, BRCA1, BRCA2, CDH1, CHEK2, PALB2, PTEN, STK11, TP53  Negative result   No pathogenic sequence variants or deletions/dupllications identified  [facility]      9/24/2019 Surgery     Left breast needle localized lumpectomy with sentinel lymph node biopsy  Invasive breast carcinoma of no special type (ductal NST)  Grade 2  1 9 cm  Margins negative  0/3 Lymph nodes  Anatomic/Prognostic Stage IA      10/11/2019 Genomic Testing     MammaPrint   Pending      11/4/2019 - 1/10/2020 Chemotherapy cyclophosphamide (CYTOXAN) 1,200 mg in sodium chloride 0 9 % 250 mL IVPB, 600 mg/m2 = 1,200 mg, Intravenous, Once, 4 of 4 cycles  Administration: 1,200 mg (2019), 1,200 mg (2019), 1,200 mg (2019), 1,200 mg (1/10/2020)  DOCEtaxel (TAXOTERE) 150 mg in sodium chloride 0 9 % 250 mL chemo infusion, 75 mg/m2 = 150 mg, Intravenous, Once, 4 of 4 cycles  Administration: 150 mg (2019), 150 mg (2019), 150 mg (2019), 150 mg (1/10/2020)             Clinical Trial: No    OB/GYN History:  The patient underwent menarche at 9 years  Menopause Status pre  No LMP recorded (lmp unknown)   - LMP 2 years ago prior to first pregnancy  Hormone replacement therapy: no      2   Para 2   Age at first delivery being 38 years     Nursing: not currently, breast fed first baby for 6 months   Birth control pills: yes, Years used less than one year         Health Maintenance   Topic Date Due    Depression Screening PHQ  1980    DTaP,Tdap,and Td Vaccines (1 - Tdap) 1989    BMI: Followup Plan  1996    Influenza Vaccine  2019    MAMMOGRAM  2020    Annual Physical  10/02/2020    BMI: Adult  01/10/2021    Cervical Cancer Screening  2024    Pneumococcal Vaccine: 65+ Years (1 of 2 - PCV13) 2043    HIV Screening  Completed    Pneumococcal Vaccine: Pediatrics (0 to 5 Years) and At-Risk Patients (6 to 59 Years)  Aged Out    HIB Vaccine  Aged Out    Hepatitis B Vaccine  Aged Out    IPV Vaccine  Aged Out    Hepatitis A Vaccine  Aged Out    Meningococcal ACWY Vaccine  Aged Out    HPV Vaccine  Aged Out       Past Medical History:   Diagnosis Date    Abnormal Pap smear of cervix     BRCA gene mutation negative 2019    [employer]    HPV (human papilloma virus) infection     Papanicolaou smear 2017    Postpartum depression     As per patient's spouse "it got severe"    Primary breast malignancy, left ([facility] [address] ) 2019    Rh negative state in antepartum period     Varicella     Vaccinated       Past Surgical History:   Procedure Laterality Date    BREAST LUMPECTOMY Left 9/24/2019    Procedure: BREAST NEEDLE LOCALIZED LUMPECTOMY (NEEDLE LOC AT 0930); Surgeon: Hue Rader MD;  [facility];  Service: Surgical Oncology    COLPOSCOPY      LYMPH NODE BIOPSY Left 9/24/2019    Procedure: SENTINEL LYMPH NODE BIOPSY; LYMPHATIC MAPPING WITH BLUE DYE AND RADIOACTIVE DYE (INJECT AT 1130 BY DR COLEMAN IN THE OR);   Surgeon: Hue Rader MD;  [facility];  Service: Surgical Oncology    MAMMO NEEDLE LOCALIZATION LEFT (ALL INC) Left 9/24/2019    US GUIDED BREAST BIOPSY LEFT COMPLETE Left 8/13/2019    US GUIDED BREAST LYMPH NODE BIOPSY LEFT Left 8/13/2019       Family History   Problem Relation Age of Onset    Stomach cancer Mother 37    Diabetes Maternal Grandmother     No Known Problems Father     No Known Problems Sister        Social History     Tobacco Use    Smoking status: Former Smoker    Smokeless tobacco: Never Used    Tobacco comment: only social smoking many years ago   Substance Use Topics    Alcohol use: Yes     Frequency: 2-4 times a month     Drinks per session: 1 or 2    Drug use: No          Current Outpatient Medications:     CRANIAL PROSTHESIS, RX,, One wig as needed, Disp: 1 Piece, Rfl: 0    escitalopram (LEXAPRO) 10 mg tablet, Take 1 tablet (10 mg total) by mouth daily, Disp: 30 tablet, Rfl: 0    Multiple Vitamins-Minerals (MULTIVITAMIN WOMEN) TABS, Take 1 tablet by mouth daily, Disp: , Rfl:     ondansetron (ZOFRAN) 4 mg tablet, Take 1 tablet (4 mg total) by mouth every 8 (eight) hours as needed for nausea or vomiting, Disp: 20 tablet, Rfl: 1    Pyridoxine HCl (VITAMIN B-6) 25 MG tablet, Take by mouth, Disp: , Rfl:     EPINEPHrine (EPIPEN) 0 3 mg/0 3 mL SOAJ, Inject 0 3 mL (0 3 mg total) into a muscle once for 1 dose, Disp: 0 6 mL, Rfl: 0    oxyCODONE-acetaminophen (PERCOCET) 5-325 mg per tablet, Take 1 tablet by mouth every 6 (six) hours as needed for moderate painMax Daily Amount: 4 tablets (Patient not taking: Reported on 1/22/2020), Disp: 6 tablet, Rfl: 0    Allergies   Allergen Reactions    Bee Venom         Review of Systems:  Review of Systems   Constitutional: Positive for fatigue  HENT: Negative  Eyes: Negative  Respiratory: Positive for shortness of breath  Cardiovascular: Negative  Gastrointestinal: Negative  Endocrine: Negative  Genitourinary: Negative  Musculoskeletal: Negative  Skin: Positive for rash (skin pruritis since chemo/dry skin)  Left axilla tenderness   Allergic/Immunologic: Negative  Neurological: Negative  Hematological: Negative  Psychiatric/Behavioral: Positive for sleep disturbance (wakes up frequently)  Vitals:    01/22/20 1300   BP: 120/78   BP Location: Left arm   Pulse: 75   Resp: 16   Temp: 98 2 °F (36 8 °C)   TempSrc: Temporal   SpO2: 97%   Weight: 83 3 kg (183 lb 10 3 oz)            Imaging:No images are attached to the encounter  Teaching: Pt previously given NCI radiation packet, reviewed possible side effects today

## 2020-01-22 NOTE — PROGRESS NOTES
Consultation - Radiation Oncology     BEBO:[id number] : 1980  Encounter: [id number]  Patient Information: [id number] [facility]  Chief Complaint   Patient presents with    Consult     Radiation Oncology     Cancer Staging  Malignant neoplasm of upper-outer quadrant of left breast in female, estrogen receptor positive ([facility] [address] )  Staging form: Breast, AJCC 8th Edition  - Pathologic: Stage IA (pT1c, pN0(sn), cM0, G2, ER+, MO+, HER2-) - Unsigned  Neoadjuvant therapy: No  Laterality: Left  Tumor size (mm): 19  Method of lymph node assessment: [place] lymph node biopsy  Histologic grading system: 3 grade system           History of Present Illness   Mary Sanches is a 37y o  year old female whoreturns to radiation oncology for CT simulation s/p adjuvant chemotherapy for left breast cancer  Initial consult with Dr Alyse Britt on 10/10/19      37year old female with [id number] invasive mammary carcinoma of the left breast, ER/MO+, Her2-, status post lumpectomy +SLNbx, with pathology showing 19mm tumor with DCIS-5mm, negative margins  Her tumor was found to be high risk based on MammaPrint, she proceeded with chemotherapy      19 - 1/10/20 4 cycles adjuvant chemotherapy with Taxotere and cyclophosphamide with excellent tolerance       19 Dr Brenda Peters, Med Onc   Pt tolerating chemo, pt will return to Dr Alyse Britt for adjuvant radiation   She will return in March to discuss adjuvant hormonal therapy       20 Surg Onc follow-up  3/18/20 Dr Brenda Peters follow-up  20 Plastic surgeon follow-up, Dr Sheila Redmond      Malignant neoplasm of upper-outer quadrant of left breast in female, estrogen receptor positive ([facility] [address] )    2019 Initial Diagnosis     Invasive breast carcinoma, left breast      2019 Biopsy     Left breast ultrasound-guided biopsy  A  2 o'clock, 14 cm from nipple  Invasive mammary carcinoma of no special type (ductal, not otherwise specified)  Grade 2  ER 90  MO 60  HER2 1+    B  Left axillary lymph node  Portion of lymph node, negative for carcinoma      8/16/2019 Genetic Testing     The following genes were evaluated: SUDHA, BRCA1, BRCA2, CDH1, CHEK2, PALB2, PTEN, STK11, TP53  Negative result  No pathogenic sequence variants or deletions/dupllications identified  [employer]      9/24/2019 Surgery     Left breast needle localized lumpectomy with sentinel lymph node biopsy  Invasive breast carcinoma of no special type (ductal NST)  Grade 2  1 9 cm  Margins negative  0/3 Lymph nodes  Anatomic/Prognostic Stage IA      10/11/2019 Genomic Testing     MammaPrint   Pending      11/4/2019 - 1/10/2020 Chemotherapy     cyclophosphamide (CYTOXAN) 1,200 mg in sodium chloride 0 9 % 250 mL IVPB, 600 mg/m2 = 1,200 mg, Intravenous, Once, 4 of 4 cycles  Administration: 1,200 mg (11/4/2019), 1,200 mg (11/22/2019), 1,200 mg (12/16/2019), 1,200 mg (1/10/2020)  DOCEtaxel (TAXOTERE) 150 mg in sodium chloride 0 9 % 250 mL chemo infusion, 75 mg/m2 = 150 mg, Intravenous, Once, 4 of 4 cycles  Administration: 150 mg (11/4/2019), 150 mg (11/22/2019), 150 mg (12/16/2019), 150 mg (1/10/2020)             Past Medical History:   Diagnosis Date    Abnormal Pap smear of cervix     BRCA gene mutation negative 08/16/2019    [employer]    HPV (human papilloma virus) infection     Papanicolaou smear 12/28/2017    Postpartum depression     As per patient's spouse "it got severe"    Primary breast malignancy, left ([facility] [address] ) 08/14/2019    Rh negative state in antepartum period     Varicella     Vaccinated     Past Surgical History:   Procedure Laterality Date    BREAST LUMPECTOMY Left 9/24/2019    Procedure: BREAST NEEDLE LOCALIZED LUMPECTOMY (NEEDLE LOC AT 0930);   Surgeon: Varsha Lima MD;  [facility];  Service: Surgical Oncology    COLPOSCOPY      LYMPH NODE BIOPSY Left 9/24/2019    Procedure: SENTINEL LYMPH NODE BIOPSY; LYMPHATIC MAPPING WITH BLUE DYE AND RADIOACTIVE DYE (INJECT AT 1130 BY DR COLEMAN IN THE OR); Surgeon: Trudy Edmonds MD;  [facility];  Service: Surgical Oncology    MAMMO NEEDLE LOCALIZATION LEFT (ALL INC) Left 9/24/2019    US GUIDED BREAST BIOPSY LEFT COMPLETE Left 8/13/2019    US GUIDED BREAST LYMPH NODE BIOPSY LEFT Left 8/13/2019       Family History   Problem Relation Age of Onset    Stomach cancer Mother 37    Diabetes Maternal Grandmother     No Known Problems Father     No Known Problems Sister        Social History   Social History     Substance and Sexual Activity   Alcohol Use Yes    Frequency: 2-4 times a month    Drinks per session: 1 or 2     Social History     Substance and Sexual Activity   Drug Use No     Social History     Tobacco Use   Smoking Status Former Smoker   Smokeless Tobacco Never Used   Tobacco Comment    only social smoking many years ago         Meds/Allergies     Current Outpatient Medications:     CRANIAL PROSTHESIS, RX,, One wig as needed, Disp: 1 Piece, Rfl: 0    escitalopram (LEXAPRO) 10 mg tablet, Take 1 tablet (10 mg total) by mouth daily, Disp: 30 tablet, Rfl: 0    Multiple Vitamins-Minerals (MULTIVITAMIN WOMEN) TABS, Take 1 tablet by mouth daily, Disp: , Rfl:     ondansetron (ZOFRAN) 4 mg tablet, Take 1 tablet (4 mg total) by mouth every 8 (eight) hours as needed for nausea or vomiting, Disp: 20 tablet, Rfl: 1    Pyridoxine HCl (VITAMIN B-6) 25 MG tablet, Take by mouth, Disp: , Rfl:     EPINEPHrine (EPIPEN) 0 3 mg/0 3 mL SOAJ, Inject 0 3 mL (0 3 mg total) into a muscle once for 1 dose, Disp: 0 6 mL, Rfl: 0    oxyCODONE-acetaminophen (PERCOCET) 5-325 mg per tablet, Take 1 tablet by mouth every 6 (six) hours as needed for moderate painMax Daily Amount: 4 tablets (Patient not taking: Reported on 1/22/2020), Disp: 6 tablet, Rfl: 0  Allergies   Allergen Reactions    Bee Venom      Clinical Trial: No     OB/GYN History:  The patient underwent menarche at 9 years  Menopause Status pre  No LMP recorded (lmp unknown)   - LMP 2 years ago prior to first pregnancy  Hormone replacement therapy: no      2   Para 2   Age at first delivery being 38 years  Nursing: not currently, breast fed first baby for 6 months   Birth control pills: yes, Years used less than one year          Review of Systems Constitutional: Positive for fatigue  HENT: Negative  Eyes: Negative  Respiratory: Positive for shortness of breath  Cardiovascular: Negative  Gastrointestinal: Negative  Endocrine: Negative  Genitourinary: Negative  Musculoskeletal: Negative  Skin: Positive for rash (skin pruritis since chemo/dry skin)  Left axilla tenderness   Allergic/Immunologic: Negative  Neurological: Negative  Hematological: Negative  Psychiatric/Behavioral: Positive for sleep disturbance (wakes up frequently)           OBJECTIVE:   /78 (BP Location: Left arm)   Pulse 75   Temp 98 2 °F (36 8 °C) (Temporal)   Resp 16   Wt 83 3 kg (183 lb 10 3 oz)   SpO2 97%   BMI 25 71 kg/m²   Pain Assessment:  2  Performance Status: ECOG/Zubrod/WHO: 1 - Symptomatic but completely ambulatory    Physical Exam GENERAL:  Appears stated age, in no apparent distress  Alert and oriented  HEENT:  Normocephalic, atraumatic   extraocular muscles intact  Oral mucosa moist   LYMPHATICS:  No cervical, supraclavicular, or infraclavicular lymphadenopathy noted bilaterally  PULMONARY:  Respirations unlabored  CARDIOVASCULAR:  Regular rate  ABDOMEN:  Soft, nondistended, nontender to palpation  No hepatosplenomegaly  NEUROLOGIC: Moving all extremities, No focal deficits noted  EXTREMITIES: no clubbing, cyanosis, or edema  PSYCHIATRIC: normal mood and affect  Appropriate thought content and judgement  BREASTS:  Patient examined in the seated position today  No gross asymmetry noted  Left breast with incision site well healed   Otherwise and no palpable lesions in bilateral breasts or axilla         RESULTS  Lab Results    Chemistry        Component Value Date/Time K 4 3 01/08/2020 1747     (H) 01/08/2020 1747    CO2 30 01/08/2020 1747    BUN 19 01/08/2020 1747    CREATININE 0 85 01/08/2020 1747        Component Value Date/Time    CALCIUM 8 8 01/08/2020 1747    ALKPHOS 51 01/08/2020 1747    AST 18 01/08/2020 1747    ALT 26 01/08/2020 1747            Lab Results   Component Value Date    WBC 6 93 01/08/2020    HGB 12 9 01/08/2020    HCT 40 9 01/08/2020    MCV 93 01/08/2020     01/08/2020         Imaging Studies  No results found  Pathology: invasive mammary carcinoma of NST, 19mm, 0/3 SLN        ASSESSMENT  1  Malignant neoplasm of upper-outer quadrant of left breast in female, estrogen receptor positive ([facility] [address] )       Cancer Staging  Malignant neoplasm of upper-outer quadrant of left breast in female, estrogen receptor positive ([facility] [address] )  Staging form: Breast, AJCC 8th Edition  - Pathologic: Stage IA (pT1c, pN0(sn), cM0, G2, ER+, IA+, HER2-) - Unsigned  Neoadjuvant therapy: No  Laterality: Left  Tumor size (mm): 19  Method of lymph node assessment: [place] lymph node biopsy  Histologic grading system: 3 grade system        PLAN/DISCUSSION  No orders of the defined types were placed in this encounter  Cory Jean-Baptiste is a 37y o  year old female with [id number] invasive mammary carcinoma of the left breast, ER/IA+, Her2-, status post lumpectomy +SLNbx, with pathology showing 19mm tumor with DCIS-5mm, negative margins, now status post adjuvant chemotherapy completed 1/10/20  We reviewed the logistics of treatment including CT simulation and treatment with deep inspiration breath hold to reduce cardiovascular risk, and side effects including but not limited to fatigue, erythema, desquamation of skin, weakened bone in treatment field, pneumonitis, cardiovascular risks, scar tissue causing possible asymmetry and secondary malignancy  PLAN:  Informed consent obtained and CT simulation performed  Will need pregnancy test prior to start    Recommendations for left breast tangents, 40 05 GY in 15 fractions, with 10Gy/5 fx boost to lumpectomy cavity to follow  Plan to start 2/10/20    Patient and her , Dr Waqas Champagne that accompanies her were in good understanding of these recommendations and all of their questions were answered to their apparent satisfaction  Thank you for allowing us to participate in the care of Mrs Melany Jameson  Roger Jerry MD  1/22/2020,1:34 PM      Portions of the record may have been created with voice recognition software   Occasional wrong word or "sound a like" substitutions may have occurred due to the inherent limitations of voice recognition software   Read the chart carefully and recognize, using context, where substitutions have occurred

## 2020-01-30 ENCOUNTER — TELEPHONE (OUTPATIENT)
Dept: HEMATOLOGY ONCOLOGY | Facility: CLINIC | Age: 40
End: 2020-01-30

## 2020-01-30 NOTE — TELEPHONE ENCOUNTER
Called patient on 1/30 left a message stating that her 3/18 appointment with dr trujillo needed to be rescheduled due to dr trujillo not being in the office that day  I rescheduled patient's appointment for 3/11 at 2:00pm instead  Stated that if this date and or time does not work for patient ti please give the office a call back

## 2020-01-31 PROCEDURE — 77300 RADIATION THERAPY DOSE PLAN: CPT | Performed by: STUDENT IN AN ORGANIZED HEALTH CARE EDUCATION/TRAINING PROGRAM

## 2020-01-31 PROCEDURE — 77295 3-D RADIOTHERAPY PLAN: CPT | Performed by: STUDENT IN AN ORGANIZED HEALTH CARE EDUCATION/TRAINING PROGRAM

## 2020-01-31 PROCEDURE — 77334 RADIATION TREATMENT AID(S): CPT | Performed by: STUDENT IN AN ORGANIZED HEALTH CARE EDUCATION/TRAINING PROGRAM

## 2020-02-10 ENCOUNTER — APPOINTMENT (OUTPATIENT)
Dept: RADIATION ONCOLOGY | Facility: HOSPITAL | Age: 40
End: 2020-02-10
Attending: STUDENT IN AN ORGANIZED HEALTH CARE EDUCATION/TRAINING PROGRAM
Payer: COMMERCIAL

## 2020-02-11 ENCOUNTER — APPOINTMENT (OUTPATIENT)
Dept: RADIATION ONCOLOGY | Facility: HOSPITAL | Age: 40
End: 2020-02-11
Attending: STUDENT IN AN ORGANIZED HEALTH CARE EDUCATION/TRAINING PROGRAM
Payer: COMMERCIAL

## 2020-02-11 ENCOUNTER — APPOINTMENT (OUTPATIENT)
Dept: LAB | Facility: HOSPITAL | Age: 40
End: 2020-02-11
Attending: STUDENT IN AN ORGANIZED HEALTH CARE EDUCATION/TRAINING PROGRAM
Payer: COMMERCIAL

## 2020-02-11 DIAGNOSIS — Z17.0 MALIGNANT NEOPLASM OF UPPER-OUTER QUADRANT OF LEFT BREAST IN FEMALE, ESTROGEN RECEPTOR POSITIVE (HCC): Primary | ICD-10-CM

## 2020-02-11 DIAGNOSIS — C50.412 MALIGNANT NEOPLASM OF UPPER-OUTER QUADRANT OF LEFT BREAST IN FEMALE, ESTROGEN RECEPTOR POSITIVE (HCC): Primary | ICD-10-CM

## 2020-02-11 DIAGNOSIS — Z17.0 MALIGNANT NEOPLASM OF UPPER-OUTER QUADRANT OF LEFT BREAST IN FEMALE, ESTROGEN RECEPTOR POSITIVE (HCC): ICD-10-CM

## 2020-02-11 DIAGNOSIS — C50.412 MALIGNANT NEOPLASM OF UPPER-OUTER QUADRANT OF LEFT BREAST IN FEMALE, ESTROGEN RECEPTOR POSITIVE (HCC): ICD-10-CM

## 2020-02-11 LAB
BASOPHILS # BLD AUTO: 0.06 THOUSANDS/ΜL (ref 0–0.1)
BASOPHILS NFR BLD AUTO: 1 % (ref 0–1)
EOSINOPHIL # BLD AUTO: 0.38 THOUSAND/ΜL (ref 0–0.61)
EOSINOPHIL NFR BLD AUTO: 8 % (ref 0–6)
ERYTHROCYTE [DISTWIDTH] IN BLOOD BY AUTOMATED COUNT: 14.5 % (ref 11.6–15.1)
HCT VFR BLD AUTO: 39.1 % (ref 34.8–46.1)
HGB BLD-MCNC: 12.6 G/DL (ref 11.5–15.4)
IMM GRANULOCYTES # BLD AUTO: 0.01 THOUSAND/UL (ref 0–0.2)
IMM GRANULOCYTES NFR BLD AUTO: 0 % (ref 0–2)
LYMPHOCYTES # BLD AUTO: 1.37 THOUSANDS/ΜL (ref 0.6–4.47)
LYMPHOCYTES NFR BLD AUTO: 27 % (ref 14–44)
MCH RBC QN AUTO: 29 PG (ref 26.8–34.3)
MCHC RBC AUTO-ENTMCNC: 32.2 G/DL (ref 31.4–37.4)
MCV RBC AUTO: 90 FL (ref 82–98)
MONOCYTES # BLD AUTO: 0.53 THOUSAND/ΜL (ref 0.17–1.22)
MONOCYTES NFR BLD AUTO: 11 % (ref 4–12)
NEUTROPHILS # BLD AUTO: 2.71 THOUSANDS/ΜL (ref 1.85–7.62)
NEUTS SEG NFR BLD AUTO: 53 % (ref 43–75)
NRBC BLD AUTO-RTO: 0 /100 WBCS
PLATELET # BLD AUTO: 240 THOUSANDS/UL (ref 149–390)
PMV BLD AUTO: 9.9 FL (ref 8.9–12.7)
RBC # BLD AUTO: 4.35 MILLION/UL (ref 3.81–5.12)
WBC # BLD AUTO: 5.06 THOUSAND/UL (ref 4.31–10.16)

## 2020-02-11 PROCEDURE — 85025 COMPLETE CBC W/AUTO DIFF WBC: CPT

## 2020-02-11 PROCEDURE — 36415 COLL VENOUS BLD VENIPUNCTURE: CPT

## 2020-02-12 ENCOUNTER — APPOINTMENT (OUTPATIENT)
Dept: RADIATION ONCOLOGY | Facility: HOSPITAL | Age: 40
End: 2020-02-12
Payer: COMMERCIAL

## 2020-02-13 ENCOUNTER — APPOINTMENT (OUTPATIENT)
Dept: RADIATION ONCOLOGY | Facility: HOSPITAL | Age: 40
End: 2020-02-13
Attending: STUDENT IN AN ORGANIZED HEALTH CARE EDUCATION/TRAINING PROGRAM
Payer: COMMERCIAL

## 2020-02-14 ENCOUNTER — APPOINTMENT (OUTPATIENT)
Dept: RADIATION ONCOLOGY | Facility: HOSPITAL | Age: 40
End: 2020-02-14
Attending: STUDENT IN AN ORGANIZED HEALTH CARE EDUCATION/TRAINING PROGRAM
Payer: COMMERCIAL

## 2020-02-17 ENCOUNTER — APPOINTMENT (OUTPATIENT)
Dept: RADIATION ONCOLOGY | Facility: HOSPITAL | Age: 40
End: 2020-02-17
Attending: STUDENT IN AN ORGANIZED HEALTH CARE EDUCATION/TRAINING PROGRAM
Payer: COMMERCIAL

## 2020-02-18 ENCOUNTER — APPOINTMENT (OUTPATIENT)
Dept: RADIATION ONCOLOGY | Facility: HOSPITAL | Age: 40
End: 2020-02-18
Payer: COMMERCIAL

## 2020-02-18 ENCOUNTER — APPOINTMENT (OUTPATIENT)
Dept: RADIATION ONCOLOGY | Facility: HOSPITAL | Age: 40
End: 2020-02-18
Attending: STUDENT IN AN ORGANIZED HEALTH CARE EDUCATION/TRAINING PROGRAM
Payer: COMMERCIAL

## 2020-02-19 ENCOUNTER — APPOINTMENT (OUTPATIENT)
Dept: RADIATION ONCOLOGY | Facility: HOSPITAL | Age: 40
End: 2020-02-19
Attending: STUDENT IN AN ORGANIZED HEALTH CARE EDUCATION/TRAINING PROGRAM
Payer: COMMERCIAL

## 2020-02-20 ENCOUNTER — APPOINTMENT (OUTPATIENT)
Dept: RADIATION ONCOLOGY | Facility: HOSPITAL | Age: 40
End: 2020-02-20
Attending: STUDENT IN AN ORGANIZED HEALTH CARE EDUCATION/TRAINING PROGRAM
Payer: COMMERCIAL

## 2020-02-21 ENCOUNTER — TRANSCRIBE ORDERS (OUTPATIENT)
Dept: LAB | Facility: HOSPITAL | Age: 40
End: 2020-02-21

## 2020-02-24 ENCOUNTER — APPOINTMENT (OUTPATIENT)
Dept: RADIATION ONCOLOGY | Facility: HOSPITAL | Age: 40
End: 2020-02-24
Attending: STUDENT IN AN ORGANIZED HEALTH CARE EDUCATION/TRAINING PROGRAM
Payer: COMMERCIAL

## 2020-02-25 ENCOUNTER — APPOINTMENT (OUTPATIENT)
Dept: RADIATION ONCOLOGY | Facility: HOSPITAL | Age: 40
End: 2020-02-25
Attending: STUDENT IN AN ORGANIZED HEALTH CARE EDUCATION/TRAINING PROGRAM
Payer: COMMERCIAL

## 2020-02-25 ENCOUNTER — APPOINTMENT (OUTPATIENT)
Dept: RADIATION ONCOLOGY | Facility: CLINIC | Age: 40
End: 2020-02-25
Attending: STUDENT IN AN ORGANIZED HEALTH CARE EDUCATION/TRAINING PROGRAM
Payer: COMMERCIAL

## 2020-02-25 PROCEDURE — 77280 THER RAD SIMULAJ FIELD SMPL: CPT | Performed by: STUDENT IN AN ORGANIZED HEALTH CARE EDUCATION/TRAINING PROGRAM

## 2020-02-25 PROCEDURE — 77334 RADIATION TREATMENT AID(S): CPT | Performed by: STUDENT IN AN ORGANIZED HEALTH CARE EDUCATION/TRAINING PROGRAM

## 2020-02-26 ENCOUNTER — APPOINTMENT (OUTPATIENT)
Dept: RADIATION ONCOLOGY | Facility: HOSPITAL | Age: 40
End: 2020-02-26
Attending: STUDENT IN AN ORGANIZED HEALTH CARE EDUCATION/TRAINING PROGRAM
Payer: COMMERCIAL

## 2020-02-27 ENCOUNTER — APPOINTMENT (OUTPATIENT)
Dept: RADIATION ONCOLOGY | Facility: HOSPITAL | Age: 40
End: 2020-02-27
Attending: STUDENT IN AN ORGANIZED HEALTH CARE EDUCATION/TRAINING PROGRAM
Payer: COMMERCIAL

## 2020-02-28 ENCOUNTER — APPOINTMENT (OUTPATIENT)
Dept: RADIATION ONCOLOGY | Facility: HOSPITAL | Age: 40
End: 2020-02-28
Attending: STUDENT IN AN ORGANIZED HEALTH CARE EDUCATION/TRAINING PROGRAM
Payer: COMMERCIAL

## 2020-03-02 ENCOUNTER — APPOINTMENT (OUTPATIENT)
Dept: RADIATION ONCOLOGY | Facility: HOSPITAL | Age: 40
End: 2020-03-02
Attending: STUDENT IN AN ORGANIZED HEALTH CARE EDUCATION/TRAINING PROGRAM
Payer: COMMERCIAL

## 2020-03-02 PROCEDURE — 77412 RADIATION TX DELIVERY LVL 3: CPT | Performed by: STUDENT IN AN ORGANIZED HEALTH CARE EDUCATION/TRAINING PROGRAM

## 2020-03-02 PROCEDURE — 77300 RADIATION THERAPY DOSE PLAN: CPT | Performed by: STUDENT IN AN ORGANIZED HEALTH CARE EDUCATION/TRAINING PROGRAM

## 2020-03-02 PROCEDURE — 77334 RADIATION TREATMENT AID(S): CPT | Performed by: STUDENT IN AN ORGANIZED HEALTH CARE EDUCATION/TRAINING PROGRAM

## 2020-03-02 PROCEDURE — 77295 3-D RADIOTHERAPY PLAN: CPT | Performed by: STUDENT IN AN ORGANIZED HEALTH CARE EDUCATION/TRAINING PROGRAM

## 2020-03-02 PROCEDURE — 77387 GUIDANCE FOR RADJ TX DLVR: CPT | Performed by: STUDENT IN AN ORGANIZED HEALTH CARE EDUCATION/TRAINING PROGRAM

## 2020-03-03 ENCOUNTER — APPOINTMENT (OUTPATIENT)
Dept: RADIATION ONCOLOGY | Facility: HOSPITAL | Age: 40
End: 2020-03-03
Payer: COMMERCIAL

## 2020-03-03 PROCEDURE — 77412 RADIATION TX DELIVERY LVL 3: CPT | Performed by: STUDENT IN AN ORGANIZED HEALTH CARE EDUCATION/TRAINING PROGRAM

## 2020-03-03 PROCEDURE — 77280 THER RAD SIMULAJ FIELD SMPL: CPT | Performed by: STUDENT IN AN ORGANIZED HEALTH CARE EDUCATION/TRAINING PROGRAM

## 2020-03-03 PROCEDURE — 77331 SPECIAL RADIATION DOSIMETRY: CPT | Performed by: STUDENT IN AN ORGANIZED HEALTH CARE EDUCATION/TRAINING PROGRAM

## 2020-03-04 ENCOUNTER — APPOINTMENT (OUTPATIENT)
Dept: RADIATION ONCOLOGY | Facility: HOSPITAL | Age: 40
End: 2020-03-04
Attending: STUDENT IN AN ORGANIZED HEALTH CARE EDUCATION/TRAINING PROGRAM
Payer: COMMERCIAL

## 2020-03-04 PROCEDURE — 77412 RADIATION TX DELIVERY LVL 3: CPT | Performed by: STUDENT IN AN ORGANIZED HEALTH CARE EDUCATION/TRAINING PROGRAM

## 2020-03-04 PROCEDURE — 77336 RADIATION PHYSICS CONSULT: CPT | Performed by: STUDENT IN AN ORGANIZED HEALTH CARE EDUCATION/TRAINING PROGRAM

## 2020-03-05 ENCOUNTER — APPOINTMENT (OUTPATIENT)
Dept: RADIATION ONCOLOGY | Facility: HOSPITAL | Age: 40
End: 2020-03-05
Attending: STUDENT IN AN ORGANIZED HEALTH CARE EDUCATION/TRAINING PROGRAM
Payer: COMMERCIAL

## 2020-03-05 PROCEDURE — 77412 RADIATION TX DELIVERY LVL 3: CPT | Performed by: STUDENT IN AN ORGANIZED HEALTH CARE EDUCATION/TRAINING PROGRAM

## 2020-03-06 ENCOUNTER — APPOINTMENT (OUTPATIENT)
Dept: RADIATION ONCOLOGY | Facility: HOSPITAL | Age: 40
End: 2020-03-06
Attending: STUDENT IN AN ORGANIZED HEALTH CARE EDUCATION/TRAINING PROGRAM
Payer: COMMERCIAL

## 2020-03-06 PROCEDURE — 77412 RADIATION TX DELIVERY LVL 3: CPT | Performed by: STUDENT IN AN ORGANIZED HEALTH CARE EDUCATION/TRAINING PROGRAM

## 2020-03-09 ENCOUNTER — APPOINTMENT (OUTPATIENT)
Dept: RADIATION ONCOLOGY | Facility: HOSPITAL | Age: 40
End: 2020-03-09
Attending: STUDENT IN AN ORGANIZED HEALTH CARE EDUCATION/TRAINING PROGRAM
Payer: COMMERCIAL

## 2020-03-09 PROCEDURE — 77412 RADIATION TX DELIVERY LVL 3: CPT | Performed by: STUDENT IN AN ORGANIZED HEALTH CARE EDUCATION/TRAINING PROGRAM

## 2020-03-10 ENCOUNTER — OFFICE VISIT (OUTPATIENT)
Dept: SURGICAL ONCOLOGY | Facility: CLINIC | Age: 40
End: 2020-03-10
Payer: COMMERCIAL

## 2020-03-10 ENCOUNTER — OFFICE VISIT (OUTPATIENT)
Dept: HEMATOLOGY ONCOLOGY | Facility: CLINIC | Age: 40
End: 2020-03-10
Payer: COMMERCIAL

## 2020-03-10 VITALS
WEIGHT: 179 LBS | BODY MASS INDEX: 25.62 KG/M2 | SYSTOLIC BLOOD PRESSURE: 100 MMHG | TEMPERATURE: 97.3 F | HEIGHT: 70 IN | HEART RATE: 65 BPM | DIASTOLIC BLOOD PRESSURE: 70 MMHG | RESPIRATION RATE: 16 BRPM

## 2020-03-10 VITALS
TEMPERATURE: 97.3 F | BODY MASS INDEX: 25.62 KG/M2 | HEART RATE: 65 BPM | RESPIRATION RATE: 18 BRPM | DIASTOLIC BLOOD PRESSURE: 70 MMHG | WEIGHT: 179 LBS | SYSTOLIC BLOOD PRESSURE: 100 MMHG | HEIGHT: 70 IN

## 2020-03-10 DIAGNOSIS — C50.412 MALIGNANT NEOPLASM OF UPPER-OUTER QUADRANT OF LEFT BREAST IN FEMALE, ESTROGEN RECEPTOR POSITIVE (HCC): Primary | ICD-10-CM

## 2020-03-10 DIAGNOSIS — Z17.0 MALIGNANT NEOPLASM OF UPPER-OUTER QUADRANT OF LEFT BREAST IN FEMALE, ESTROGEN RECEPTOR POSITIVE (HCC): Primary | ICD-10-CM

## 2020-03-10 PROCEDURE — 99214 OFFICE O/P EST MOD 30 MIN: CPT | Performed by: INTERNAL MEDICINE

## 2020-03-10 PROCEDURE — 99214 OFFICE O/P EST MOD 30 MIN: CPT | Performed by: NURSE PRACTITIONER

## 2020-03-10 RX ORDER — TAMOXIFEN CITRATE 20 MG/1
20 TABLET ORAL DAILY
Qty: 90 TABLET | Refills: 1 | Status: SHIPPED | OUTPATIENT
Start: 2020-03-10 | End: 2020-06-19 | Stop reason: SDUPTHER

## 2020-03-10 NOTE — PROGRESS NOTES
Hematology / Oncology Outpatient Follow Up Note    Emiliana Piedra 37 y o  female FIZ:[id number] ERR:[id number]         Date:  3/10/2020    Assessment / Plan:  A 42-year-old premenopausal woman with stage IA left breast cancer, grade 2, ER 90% positive, MO 60% positive, HER2 negative disease   She is negative for BRCA gene mutation   She underwent lumpectomy and sentinel lymph node biopsy, resulting in LAVON   Her tumor was found to be high risk based on a MammaPrint and luminal B type  She completed adjuvant chemotherapy with Taxotere and cyclophosphamide as well as radiation therapy  Clinically, she has no evidence recurrent disease  I recommended her to start tamoxifen as adjuvant hormonal therapy  Side effects of tamoxifen was thoroughly discussed, including but not limited to hot flashes, small risk of DVT, stroke and endometrial cancer  She understood and wished to proceed  I will see her again in 6 months for routine follow-up         Subjective:      HPI:  A 42-year-old premenopausal woman who noticed left breast lump 5 months ago  Maverick Heatno brought to medical attention   When she was found her lump, she was pregnant  Maverick Hetaon was found to have radiographic abnormality in her left breast which was biopsied in August 13, 2019 which showed invasive ductal carcinoma, grade 2  This was ER 90 % positive, MO 60% positive, HER2 negative disease   Subsequently, she delivered baby few weeks after her biopsy   She was seen by Dr Leola Suresh  Maverick Heaton was negative for BRCA gene mutation  Mack Mckeon, she underwent left lumpectomy in September 24, 2019 which showed 19 mm of invasive ductal carcinoma, grade 2  3 sentinel lymph nodes were all negative for metastatic disease   Dr Leola Suresh sent her tumor tissue for MammaPrint which came back as high risk, luminal B type   She presents today with her  who is a emergency department physician at [facility] minor to discuss adjuvant treatment options   She has no past medical history   She feels well   She has no complaint of pain   Her weight is stable   She has no respiratory symptoms  Marvel Lozoya is a lifetime never smoker  Marvel Lozoya has no family history of breast or ovarian cancer   Her performance status is normal              Interval History:   A 64-year-old premenopausal woman with stage IA left breast cancer, grade 2, ER 90% positive, OK 60% positive, HER2 negative disease   She is negative for BRCA gene mutation   She underwent lumpectomy and sentinel lymph node biopsy, resulting in LAVON    Her tumor was found to be high risk, based on a MammaPrint   Therefore, she was treated with 4 cycle of adjuvant chemotherapy with Taxotere and cyclophosphamide which she completed in January 2020  She completed adjuvant radiation therapy, today with minimal skin toxicity  She presents today for routine follow-up  She feels well  She has no respiratory symptoms  Her weight is stable  She has no complaint of pain    Her performance status is normal   She has not resumed menstrual cycle yet        Objective:      Primary Diagnosis:     1 Left breast cancer, stage IA (pT1c, pN0, M0) grade 2, ER 90% positive, OK 60% positive, HER2 negative disease   MammaPrint high risk   Diagnosed in September 2019    2  BRCA gene mutation negative      Cancer Staging:  Cancer Staging  Malignant neoplasm of upper-outer quadrant of left breast in female, estrogen receptor positive ([facility] [address] )  Staging form: Breast, AJCC 8th Edition  - Pathologic: Stage IA (pT1c, pN0(sn), cM0, G2, ER+, OK+, HER2-) - Unsigned  Neoadjuvant therapy: No  Laterality: Left  Tumor size (mm): 19  Method of lymph node assessment: [place] lymph node biopsy  Histologic grading system: 3 grade system           Previous Hematologic/ Oncologic Treatment:       Adjuvant chemotherapy with Taxotere and cyclophosphamide x4 cycle, completed in early January 2020       Current Hematologic/ Oncologic Treatment:       Adjuvant hormonal therapy with tamoxifen to be started in March 2020  Disease Status:      LAVON status post lumpectomy and sentinel lymph node biopsy      Test Results:     Pathology:      1 9 x 1 8 x 1 8 cm of invasive ductal carcinoma, grade 2  3 sentinel lymph nodes were negative for metastatic disease   ER 90% positive, [place] 60% positive, HER2 negative disease   MammaPrint high risk, luminal B type   Stage IA (pT1c, pN0, M0)       Radiology:     Chest x-ray was negative for pulmonary disease      Laboratory:     See below      Physical Exam:        General Appearance:    Alert, oriented          Eyes:    PERRL   Ears:    Normal external ear canals, both ears   Nose:   Nares normal, septum midline   Throat:   Mucosa moist  Pharynx without injection  Neck:   Supple         Lungs:     Clear to auscultation bilaterally   Chest Wall:    No tenderness or deformity    Heart:    Regular rate and rhythm         Abdomen:     Soft, non-tender, bowel sounds +, no organomegaly               Extremities:   Extremities no cyanosis or edema         Skin:   no rash or icterus  Lymph nodes:   Cervical, supraclavicular, and axillary nodes normal   Neurologic:   CNII-XII intact, normal strength, sensation and reflexes     Throughout             Breast exam:   Lumpectomy scar at upper outer quadrant of her left breast with no palpable abnormalities   Right breast exam is negative  ROS: Review of Systems   All other systems reviewed and are negative  Imaging: No results found        Labs:   Lab Results   Component Value Date    WBC 5 06 02/11/2020    HGB 12 6 02/11/2020    HCT 39 1 02/11/2020    MCV 90 02/11/2020     02/11/2020     Lab Results   Component Value Date    K 4 3 01/08/2020     (H) 01/08/2020    CO2 30 01/08/2020    BUN 19 01/08/2020    CREATININE 0 85 01/08/2020    GLUF 82 09/15/2019    CALCIUM 8 8 01/08/2020    AST 18 01/08/2020    ALT 26 01/08/2020    ALKPHOS 51 01/08/2020    EGFR 85 01/08/2020           Current Medications: Reviewed  Allergies: Reviewed  PMH/FH/SH:  Reviewed      Vital Sign:    There is no height or weight on file to calculate BSA      Wt Readings from Last 3 Encounters:   03/10/20 81 2 kg (179 lb)   01/22/20 83 3 kg (183 lb 10 3 oz)   01/10/20 82 8 kg (182 lb 8 7 oz)        Temp Readings from Last 3 Encounters:   03/10/20 (!) 97 3 °F (36 3 °C) (Oral)   01/22/20 98 2 °F (36 8 °C) (Temporal)   01/10/20 (!) 97 3 °F (36 3 °C) (Tympanic)        BP Readings from Last 3 Encounters:   03/10/20 100/70   01/22/20 120/78   01/10/20 126/64         Pulse Readings from Last 3 Encounters:   03/10/20 65   01/22/20 75   01/10/20 60     @LASTSAO2(3)@

## 2020-03-10 NOTE — PROGRESS NOTES
Surgical Oncology Follow Up       [address] [facility] SURGICAL ONCOLOGY [place]  [address] [facility] [address]    Pascale King  1980  [id number]  [facility] [place]  [address] [facility] [address]    Chief Complaint   Patient presents with    Breast Cancer     Pt is here for 3 month f/u       Assessment/Plan:  1  Malignant neoplasm of upper-outer quadrant of left breast in female, estrogen receptor positive ([facility] [address] )  - Mammo diagnostic bilateral w 3d & cad; Future  - 6 mo f/u visit    Discussion/Summary:  Patient is a 57-year-old female who presents today for a three-month follow-up visit for left breast cancer diagnosed in August of 2019  Her pathology revealed invasive carcinoma, ER 90%, OK 60%, her 2-  She underwent genetic testing which was negative  She underwent a left lumpectomy and sentinel node biopsy by Dr Ethel Small  Her tumor was high risk on Mammaprint  She completed adjuvant chemotherapy and completed radiation therapy yesterday  She will be meeting with medical oncology later today to discuss Tamoxifen therapy  She has no new complaints today aside from which she describes as swelling at the lumpectomy site  On her exam, there are no worrisome findings  She has mild scar tissue but this is to be expected after surgery and radiation therapy  I will order a bilateral 3D diagnostic mammogram to be completed at the end of August to allow for approximately 5-6 months of recovery from radiation therapy  I will plan to see her back in 6 months or sooner if the need arises  She was instructed to call with any new concerns or symptoms prior to that time  All of her questions were answered today      History of Present Illness:        Malignant neoplasm of upper-outer quadrant of left breast in female, estrogen receptor positive ([facility] [address] )    8/2019 Initial Diagnosis     Invasive breast carcinoma, left breast      8/13/2019 Biopsy     Left breast ultrasound-guided biopsy  A  2 o'clock, 14 cm from nipple  Invasive mammary carcinoma of no special type (ductal, not otherwise specified)  Grade 2  ER 90  LA 60  HER2 1+    B  Left axillary lymph node  Portion of lymph node, negative for carcinoma      8/16/2019 Genetic Testing     The following genes were evaluated: SUDHA, BRCA1, BRCA2, CDH1, CHEK2, PALB2, PTEN, STK11, TP53  Negative result  No pathogenic sequence variants or deletions/dupllications identified  [employer]      9/24/2019 Surgery     Left breast needle localized lumpectomy with sentinel lymph node biopsy  Invasive breast carcinoma of no special type (ductal NST)  Grade 2  1 9 cm  Margins negative  0/3 Lymph nodes  Anatomic/Prognostic Stage IA      10/11/2019 Genomic Testing     MammaPrint   High Risk      11/4/2019 - 1/10/2020 Chemotherapy     cyclophosphamide (CYTOXAN) 1,200 mg in sodium chloride 0 9 % 250 mL IVPB, 600 mg/m2 = 1,200 mg, Intravenous, Once, 4 of 4 cycles  Administration: 1,200 mg (11/4/2019), 1,200 mg (11/22/2019), 1,200 mg (12/16/2019), 1,200 mg (1/10/2020)  DOCEtaxel (TAXOTERE) 150 mg in sodium chloride 0 9 % 250 mL chemo infusion, 75 mg/m2 = 150 mg, Intravenous, Once, 4 of 4 cycles  Administration: 150 mg (11/4/2019), 150 mg (11/22/2019), 150 mg (12/16/2019), 150 mg (1/10/2020)        2/10/2020 -  Radiation     WBRT          -Interval History:  Patient presents today for three-month follow-up visit for left breast cancer diagnosed in 2019  She completed radiation therapy yesterday  She will be meeting with the medical oncologist to discuss the initiation of tamoxifen therapy  Denies headaches, back pain, bone pain, cough, shortness of breath, abdominal pain  She does feel that she is swollen at her left breast incision  Review of Systems:  Review of Systems   Constitutional: Negative for activity change, appetite change, chills, fatigue, fever and unexpected weight change     HENT: Negative for trouble swallowing  Eyes: Negative for pain, redness and visual disturbance  Respiratory: Negative for cough, shortness of breath and wheezing  Cardiovascular: Negative for chest pain, palpitations and leg swelling  Gastrointestinal: Negative for abdominal pain, constipation, diarrhea, nausea and vomiting  Endocrine: Negative for cold intolerance and heat intolerance  Musculoskeletal: Negative for arthralgias, back pain, gait problem and myalgias  Skin: Negative for color change and rash  Neurological: Negative for dizziness, syncope, light-headedness, numbness and headaches  Hematological: Negative for adenopathy  Psychiatric/Behavioral: Negative for agitation and confusion  All other systems reviewed and are negative  Patient Active Problem List   Diagnosis    AMA (advanced maternal age) multigravida 33+    Previous  labor affecting pregnancy, antepartum    39 weeks gestation of pregnancy    Positive GBS test    Malignant neoplasm of upper-outer quadrant of left breast in female, estrogen receptor positive ([facility] [address] )    Forceps delivery with baby delivered     Past Medical History:   Diagnosis Date    Abnormal Pap smear of cervix     BRCA gene mutation negative 2019    [employer]    History of chemotherapy 2019    HPV (human papilloma virus) infection     Papanicolaou smear 2017    Postpartum depression     As per patient's spouse "it got severe"    Primary breast malignancy, left ([facility] [address] ) 2019    Rh negative state in antepartum period     Varicella     Vaccinated     Past Surgical History:   Procedure Laterality Date    BREAST LUMPECTOMY Left 2019    Procedure: BREAST NEEDLE LOCALIZED LUMPECTOMY (NEEDLE LOC AT 0930);   Surgeon: Zenia Cornell MD;  [facility];  Service: Surgical Oncology    COLPOSCOPY      LYMPH NODE BIOPSY Left 2019    Procedure: SENTINEL LYMPH NODE BIOPSY; LYMPHATIC MAPPING WITH BLUE DYE AND RADIOACTIVE DYE (INJECT AT 1130 BY DR COLEMAN IN THE OR);   Surgeon: Mary Beth Garcia MD;  [facility];  Service: Surgical Oncology    MAMMO NEEDLE LOCALIZATION LEFT (ALL INC) Left 9/24/2019    US GUIDED BREAST BIOPSY LEFT COMPLETE Left 8/13/2019    US GUIDED BREAST LYMPH NODE BIOPSY LEFT Left 8/13/2019     Family History   Problem Relation Age of Onset    Stomach cancer Mother 37    Diabetes Maternal Grandmother     No Known Problems Father     No Known Problems Sister      Social History     Socioeconomic History    Marital status: /Civil Union     Spouse name: Not on file    Number of children: Not on file    Years of education: Not on file    Highest education level: Not on file   Occupational History    Not on file   Social Needs    Financial resource strain: Not on file    Food insecurity:     Worry: Not on file     Inability: Not on file    Transportation needs:     Medical: Not on file     Non-medical: Not on file   Tobacco Use    Smoking status: Former Smoker    Smokeless tobacco: Never Used    Tobacco comment: only social smoking many years ago   Substance and Sexual Activity    Alcohol use: Yes     Frequency: 2-4 times a month     Drinks per session: 1 or 2    Drug use: No    Sexual activity: Not Currently   Lifestyle    Physical activity:     Days per week: Not on file     Minutes per session: Not on file    Stress: Not on file   Relationships    Social connections:     Talks on phone: Not on file     Gets together: Not on file     Attends Druze service: Not on file     Active member of club or organization: Not on file     Attends meetings of clubs or organizations: Not on file     Relationship status: Not on file    Intimate partner violence:     Fear of current or ex partner: Not on file     Emotionally abused: Not on file     Physically abused: Not on file     Forced sexual activity: Not on file   Other Topics Concern    Not on file   Social History Narrative    Not on file Current Outpatient Medications:     CRANIAL PROSTHESIS, RX,, One wig as needed, Disp: 1 Piece, Rfl: 0    escitalopram (LEXAPRO) 10 mg tablet, Take 1 tablet (10 mg total) by mouth daily, Disp: 30 tablet, Rfl: 0    Multiple Vitamins-Minerals (MULTIVITAMIN WOMEN) TABS, Take 1 tablet by mouth daily, Disp: , Rfl:     ondansetron (ZOFRAN) 4 mg tablet, Take 1 tablet (4 mg total) by mouth every 8 (eight) hours as needed for nausea or vomiting, Disp: 20 tablet, Rfl: 1    oxyCODONE-acetaminophen (PERCOCET) 5-325 mg per tablet, Take 1 tablet by mouth every 6 (six) hours as needed for moderate painMax Daily Amount: 4 tablets, Disp: 6 tablet, Rfl: 0    Pyridoxine HCl (VITAMIN B-6) 25 MG tablet, Take by mouth, Disp: , Rfl:     EPINEPHrine (EPIPEN) 0 3 mg/0 3 mL SOAJ, Inject 0 3 mL (0 3 mg total) into a muscle once for 1 dose, Disp: 0 6 mL, Rfl: 0  Allergies   Allergen Reactions    Bee Venom      Vitals:    03/10/20 1531   BP: 100/70   Pulse: 65   Resp: 16   Temp: (!) 97 3 °F (36 3 °C)       Physical Exam   Constitutional: She is oriented to person, place, and time  Vital signs are normal  She appears well-developed and well-nourished  No distress  HENT:   Head: Normocephalic and atraumatic  Neck: Normal range of motion  Cardiovascular: Normal rate, regular rhythm and normal heart sounds  Pulmonary/Chest: Effort normal and breath sounds normal    Bilateral breasts were examined in the sitting and supine position  Left upper outer breast incision present  There is a slight pink discoloration of the breast s/p RT  There are no masses, skin nodules, nipple changes or nipple discharge  There is no bilateral supraclavicular or axillary lymphadenopathy noted  Abdominal: Soft  Normal appearance  She exhibits no mass  There is no hepatosplenomegaly  There is no tenderness  Musculoskeletal: Normal range of motion  Lymphadenopathy:     She has no axillary adenopathy          Right: No supraclavicular adenopathy present  Left: No supraclavicular adenopathy present  Neurological: She is alert and oriented to person, place, and time  Skin: Skin is warm, dry and intact  No rash noted  She is not diaphoretic  Psychiatric: She has a normal mood and affect  Her speech is normal    Vitals reviewed  Advance Care Planning/Advance Directives:  Discussed disease status, cancer treatment plans and/or cancer treatment goals with the patient

## 2020-03-12 ENCOUNTER — DOCUMENTATION (OUTPATIENT)
Dept: INFUSION CENTER | Facility: CLINIC | Age: 40
End: 2020-03-12

## 2020-03-24 ENCOUNTER — TELEPHONE (OUTPATIENT)
Dept: HEMATOLOGY ONCOLOGY | Facility: MEDICAL CENTER | Age: 40
End: 2020-03-24

## 2020-03-24 ENCOUNTER — TELEPHONE (OUTPATIENT)
Dept: HEMATOLOGY ONCOLOGY | Facility: CLINIC | Age: 40
End: 2020-03-24

## 2020-03-24 NOTE — TELEPHONE ENCOUNTER
Patient  called in stated that he needs the order refax for insurance purposes CRANIAL PROSTHESIS, RX, fax to [phone], if you any questions pleae call patient  back at [phone]

## 2020-04-08 ENCOUNTER — TELEPHONE (OUTPATIENT)
Dept: RADIATION ONCOLOGY | Facility: HOSPITAL | Age: 40
End: 2020-04-08

## 2020-04-10 ENCOUNTER — RADIATION ONCOLOGY FOLLOW-UP (OUTPATIENT)
Dept: RADIATION ONCOLOGY | Facility: HOSPITAL | Age: 40
End: 2020-04-10
Attending: STUDENT IN AN ORGANIZED HEALTH CARE EDUCATION/TRAINING PROGRAM
Payer: COMMERCIAL

## 2020-04-10 VITALS — HEIGHT: 70 IN | BODY MASS INDEX: 25.68 KG/M2

## 2020-04-10 DIAGNOSIS — C50.412 MALIGNANT NEOPLASM OF UPPER-OUTER QUADRANT OF LEFT BREAST IN FEMALE, ESTROGEN RECEPTOR POSITIVE (HCC): Primary | ICD-10-CM

## 2020-04-10 DIAGNOSIS — Z17.0 MALIGNANT NEOPLASM OF UPPER-OUTER QUADRANT OF LEFT BREAST IN FEMALE, ESTROGEN RECEPTOR POSITIVE (HCC): Primary | ICD-10-CM

## 2020-04-10 PROCEDURE — 99211 OFF/OP EST MAY X REQ PHY/QHP: CPT | Performed by: STUDENT IN AN ORGANIZED HEALTH CARE EDUCATION/TRAINING PROGRAM

## 2020-06-19 ENCOUNTER — TELEPHONE (OUTPATIENT)
Dept: HEMATOLOGY ONCOLOGY | Facility: MEDICAL CENTER | Age: 40
End: 2020-06-19

## 2020-06-19 DIAGNOSIS — Z17.0 MALIGNANT NEOPLASM OF UPPER-OUTER QUADRANT OF LEFT BREAST IN FEMALE, ESTROGEN RECEPTOR POSITIVE (HCC): ICD-10-CM

## 2020-06-19 DIAGNOSIS — C50.412 MALIGNANT NEOPLASM OF UPPER-OUTER QUADRANT OF LEFT BREAST IN FEMALE, ESTROGEN RECEPTOR POSITIVE (HCC): ICD-10-CM

## 2020-06-19 RX ORDER — TAMOXIFEN CITRATE 20 MG/1
20 TABLET ORAL DAILY
Qty: 90 TABLET | Refills: 1 | Status: SHIPPED | OUTPATIENT
Start: 2020-06-19 | End: 2020-12-23 | Stop reason: SDUPTHER

## 2020-07-06 ENCOUNTER — TRANSCRIBE ORDERS (OUTPATIENT)
Dept: RADIOLOGY | Facility: HOSPITAL | Age: 40
End: 2020-07-06

## 2020-07-06 ENCOUNTER — TRANSCRIBE ORDERS (OUTPATIENT)
Dept: ADMINISTRATIVE | Facility: HOSPITAL | Age: 40
End: 2020-07-06

## 2020-07-06 DIAGNOSIS — E04.1 NONTOXIC UNINODULAR GOITER: Primary | ICD-10-CM

## 2020-07-15 ENCOUNTER — HOSPITAL ENCOUNTER (OUTPATIENT)
Dept: RADIOLOGY | Facility: HOSPITAL | Age: 40
Discharge: HOME | End: 2020-07-15
Attending: FAMILY MEDICINE
Payer: COMMERCIAL

## 2020-07-15 DIAGNOSIS — E04.1 NONTOXIC UNINODULAR GOITER: ICD-10-CM

## 2020-07-15 PROCEDURE — 76536 US EXAM OF HEAD AND NECK: CPT

## 2020-08-11 PROCEDURE — 88305 TISSUE EXAM BY PATHOLOGIST: CPT | Performed by: STUDENT IN AN ORGANIZED HEALTH CARE EDUCATION/TRAINING PROGRAM

## 2020-08-12 ENCOUNTER — LAB REQUISITION (OUTPATIENT)
Dept: LAB | Facility: HOSPITAL | Age: 40
End: 2020-08-12
Payer: COMMERCIAL

## 2020-08-12 DIAGNOSIS — D22.21 MELANOCYTIC NEVI OF RIGHT EAR AND EXTERNAL AURICULAR CANAL: ICD-10-CM

## 2020-09-02 ENCOUNTER — HOSPITAL ENCOUNTER (OUTPATIENT)
Dept: MAMMOGRAPHY | Facility: CLINIC | Age: 40
Discharge: HOME | End: 2020-09-02
Payer: COMMERCIAL

## 2020-09-02 VITALS — WEIGHT: 179 LBS | HEIGHT: 70 IN | TEMPERATURE: 97.6 F | BODY MASS INDEX: 25.62 KG/M2

## 2020-09-02 DIAGNOSIS — Z17.0 MALIGNANT NEOPLASM OF UPPER-OUTER QUADRANT OF LEFT BREAST IN FEMALE, ESTROGEN RECEPTOR POSITIVE (HCC): ICD-10-CM

## 2020-09-02 DIAGNOSIS — C50.412 MALIGNANT NEOPLASM OF UPPER-OUTER QUADRANT OF LEFT BREAST IN FEMALE, ESTROGEN RECEPTOR POSITIVE (HCC): ICD-10-CM

## 2020-09-02 PROCEDURE — 77066 DX MAMMO INCL CAD BI: CPT

## 2020-09-02 PROCEDURE — G0279 TOMOSYNTHESIS, MAMMO: HCPCS

## 2020-09-23 ENCOUNTER — ANNUAL EXAM (OUTPATIENT)
Dept: OBGYN CLINIC | Facility: CLINIC | Age: 40
End: 2020-09-23
Payer: COMMERCIAL

## 2020-09-23 VITALS
SYSTOLIC BLOOD PRESSURE: 100 MMHG | DIASTOLIC BLOOD PRESSURE: 70 MMHG | HEIGHT: 72 IN | WEIGHT: 176 LBS | BODY MASS INDEX: 23.84 KG/M2

## 2020-09-23 DIAGNOSIS — Z01.419 ENCOUNTER FOR ANNUAL ROUTINE GYNECOLOGICAL EXAMINATION: Primary | ICD-10-CM

## 2020-09-23 DIAGNOSIS — Z11.51 SPECIAL SCREENING EXAMINATION FOR HUMAN PAPILLOMAVIRUS (HPV): ICD-10-CM

## 2020-09-23 DIAGNOSIS — Z01.419 CERVICAL SMEAR, AS PART OF ROUTINE GYNECOLOGICAL EXAMINATION: ICD-10-CM

## 2020-09-23 PROCEDURE — 87624 HPV HI-RISK TYP POOLED RSLT: CPT | Performed by: OBSTETRICS & GYNECOLOGY

## 2020-09-23 PROCEDURE — G0145 SCR C/V CYTO,THINLAYER,RESCR: HCPCS | Performed by: OBSTETRICS & GYNECOLOGY

## 2020-09-23 PROCEDURE — 99396 PREV VISIT EST AGE 40-64: CPT | Performed by: OBSTETRICS & GYNECOLOGY

## 2020-09-23 NOTE — PROGRESS NOTES
The patient is here for a yearly  The patient is due for a pap smear  The patient does not have her period because she is on tamoxifen and is on chemo  No bleeding or cramping  No bowel, bladder or vaginal problems  Normal pap 1/20/19, pap ASCUS HPV pos 12/28/17

## 2020-09-29 ENCOUNTER — TELEPHONE (OUTPATIENT)
Dept: SURGICAL ONCOLOGY | Facility: CLINIC | Age: 40
End: 2020-09-29

## 2020-09-30 ENCOUNTER — OFFICE VISIT (OUTPATIENT)
Dept: SURGICAL ONCOLOGY | Facility: CLINIC | Age: 40
End: 2020-09-30
Payer: COMMERCIAL

## 2020-09-30 ENCOUNTER — OFFICE VISIT (OUTPATIENT)
Dept: HEMATOLOGY ONCOLOGY | Facility: CLINIC | Age: 40
End: 2020-09-30
Payer: COMMERCIAL

## 2020-09-30 VITALS
HEIGHT: 70 IN | SYSTOLIC BLOOD PRESSURE: 126 MMHG | WEIGHT: 176 LBS | OXYGEN SATURATION: 92 % | RESPIRATION RATE: 18 BRPM | HEART RATE: 83 BPM | BODY MASS INDEX: 25.2 KG/M2 | TEMPERATURE: 96.7 F | DIASTOLIC BLOOD PRESSURE: 68 MMHG

## 2020-09-30 VITALS
RESPIRATION RATE: 18 BRPM | DIASTOLIC BLOOD PRESSURE: 68 MMHG | SYSTOLIC BLOOD PRESSURE: 126 MMHG | BODY MASS INDEX: 25.2 KG/M2 | TEMPERATURE: 97.4 F | WEIGHT: 176 LBS | HEART RATE: 83 BPM | HEIGHT: 70 IN

## 2020-09-30 DIAGNOSIS — Z17.0 MALIGNANT NEOPLASM OF UPPER-OUTER QUADRANT OF LEFT BREAST IN FEMALE, ESTROGEN RECEPTOR POSITIVE (HCC): Primary | ICD-10-CM

## 2020-09-30 DIAGNOSIS — Z79.810 USE OF TAMOXIFEN (NOLVADEX): ICD-10-CM

## 2020-09-30 DIAGNOSIS — C50.412 MALIGNANT NEOPLASM OF UPPER-OUTER QUADRANT OF LEFT BREAST IN FEMALE, ESTROGEN RECEPTOR POSITIVE (HCC): Primary | ICD-10-CM

## 2020-09-30 DIAGNOSIS — R07.89 CHEST WALL TENDERNESS: ICD-10-CM

## 2020-09-30 PROCEDURE — 99214 OFFICE O/P EST MOD 30 MIN: CPT | Performed by: NURSE PRACTITIONER

## 2020-09-30 PROCEDURE — 99214 OFFICE O/P EST MOD 30 MIN: CPT | Performed by: INTERNAL MEDICINE

## 2020-09-30 NOTE — PROGRESS NOTES
.Pharmacy is requesting a 90 day supply on the medication. .  Requested Prescriptions     Pending Prescriptions Disp Refills    sertraline (ZOLOFT) 50 mg tablet 90 Tab 1     Sig: Take 1 Tab by mouth daily. Increased dose           . Pharmacy on file verified          LastRefill:8/30/19          LOV: Monday, September 09, 2019 Surgical Oncology Follow Up       [address] [place] [address],[address]  [facility] [place]  [address] [facility] [address]    Pérez Arnold  1980  [id number]  [address] [facility],[address] [facility]  [facility] [place]  [address] [facility] [address]    Chief Complaint   Patient presents with    Breast Cancer       Assessment/Plan:  1  Malignant neoplasm of upper-outer quadrant of left breast in female, estrogen receptor positive ([address] )  - 6 mo f/u visit    2  Use of tamoxifen (Nolvadex)  - Continue use per medical oncology    3  Chest wall tenderness  - Ambulatory referral to Physical Therapy; Future    Discussion/Summary: Patient is a 25-year-old female who presents today for a six-month follow-up visit for left breast cancer diagnosed in August of 2019  Her pathology revealed invasive carcinoma, ER 90%, CO 60%, her 2-  She underwent genetic testing which was negative  She underwent a left lumpectomy and sentinel node biopsy by Dr Andreea Tao  Her tumor was high risk on Mammaprint  She completed adjuvant chemotherapy and WBRT  She is currently taking Tamoxifen  She had a bilateral 3D diagnostic mammogram on September 2nd 2020 which was BI-RADS 2, category 2 density  There are expected postoperative changes  There is an intramammary lymph node in the upper outer quadrant of the right breast which was benign  Her only complaint today is tenderness with stretching of her left chest wall muscles  She has been performing home exercises  I have recommended physical therapy  Referral placed  There are no concerns on her exam today  She has no other complaints  I will plan to see her back in 6 months or sooner should the need arise  She was instructed to call with any new concerns or symptoms prior to that time  All of her questions were answered today        History of Present Illness:     Oncology History   Malignant neoplasm of upper-outer quadrant of left breast in female, estrogen receptor positive ([facility] [address] )   8/2019 Initial Diagnosis    Invasive breast carcinoma, left breast     8/13/2019 Biopsy    Left breast ultrasound-guided biopsy  A  2 o'clock, 14 cm from nipple  Invasive mammary carcinoma of no special type (ductal, not otherwise specified)  Grade 2  ER 90  NM 60  HER2 1+    B  Left axillary lymph node  Portion of lymph node, negative for carcinoma     8/16/2019 Genetic Testing    The following genes were evaluated: SUDHA, BRCA1, BRCA2, CDH1, CHEK2, PALB2, PTEN, STK11, TP53  Negative result  No pathogenic sequence variants or deletions/dupllications identified  [facility]     9/24/2019 Surgery    Left breast needle localized lumpectomy with sentinel lymph node biopsy  Invasive breast carcinoma of no special type (ductal NST)  Grade 2  1 9 cm  Margins negative  0/3 Lymph nodes  Anatomic/Prognostic Stage IA     10/11/2019 Genomic Testing    MammaPrint   High Risk     11/4/2019 -  Chemotherapy    cyclophosphamide (CYTOXAN) 1,200 mg in sodium chloride 0 9 % 250 mL IVPB, 600 mg/m2 = 1,200 mg, Intravenous, Once, 4 of 4 cycles  Administration: 1,200 mg (11/4/2019), 1,200 mg (11/22/2019), 1,200 mg (12/16/2019), 1,200 mg (1/10/2020)  DOCEtaxel (TAXOTERE) 150 mg in sodium chloride 0 9 % 250 mL chemo infusion, 75 mg/m2 = 150 mg, Intravenous, Once, 4 of 4 cycles  Administration: 150 mg (11/4/2019), 150 mg (11/22/2019), 150 mg (12/16/2019), 150 mg (1/10/2020)       2/11/2020 - 3/9/2020 Radiation    Course: C1    Plan ID Energy Fractions Dose per Fraction (cGy) Dose Correction (cGy) Total Dose Delivered (cGy) Elapsed Days   BH L BREAST 6X 15 / 15 267 0 4,005 20   L BRST BOOST 9E 5 / 5 200 0 1,000 6      Treatment dates:  C1: 2/11/2020 - 3/9/2020       3/2020 -  Hormone Therapy    Tamoxifen 20 mg PO daily          -Interval History:  Patient presents today for follow-up visit for left breast cancer diagnosed in 2019    She notices no changes on her self exam but does report some tenderness with stretching  Denies headaches, back pain, bone pain, cough, shortness of breath, abdominal pain  She is taking tamoxifen  She had a bilateral 3D diagnostic mammogram on 2020 which was BI-RADS 2, category 2 density  There are expected postoperative changes  There is an intramammary lymph node in the upper outer quadrant of the right breast which was benign  Review of Systems:  Review of Systems   Constitutional: Negative for activity change, appetite change, chills, fatigue, fever and unexpected weight change  HENT: Negative for trouble swallowing  Eyes: Negative for pain, redness and visual disturbance  Respiratory: Negative for cough, shortness of breath and wheezing  Cardiovascular: Negative for chest pain, palpitations and leg swelling  Gastrointestinal: Negative for abdominal pain, constipation, diarrhea, nausea and vomiting  Endocrine: Negative for cold intolerance and heat intolerance  Musculoskeletal: Negative for arthralgias, back pain, gait problem and myalgias  Skin: Negative for color change and rash  Neurological: Negative for dizziness, syncope, light-headedness, numbness and headaches  Hematological: Negative for adenopathy  Psychiatric/Behavioral: Negative for agitation and confusion  All other systems reviewed and are negative        Patient Active Problem List   Diagnosis    AMA (advanced maternal age) multigravida 33+    Previous  labor affecting pregnancy, antepartum    39 weeks gestation of pregnancy    Positive GBS test    Malignant neoplasm of upper-outer quadrant of left breast in female, estrogen receptor positive ([address] )    Forceps delivery with baby delivered    Use of tamoxifen (Nolvadex)     Past Medical History:   Diagnosis Date    Abnormal Pap smear of cervix     BRCA gene mutation negative 2019    [employer]    History of chemotherapy 2019    HPV (human papilloma virus) infection     Papanicolaou smear 12/28/2017    Postpartum depression     As per patient's spouse "it got severe"    Primary breast malignancy, left ([address] ) 08/14/2019    Rh negative state in antepartum period     Varicella     Vaccinated     Past Surgical History:   Procedure Laterality Date    BREAST BIOPSY Left 08/13/2019    us bx    BREAST LUMPECTOMY Left 9/24/2019    Procedure: BREAST NEEDLE LOCALIZED LUMPECTOMY (NEEDLE LOC AT 0930); Surgeon: Melody Wellington MD;  [facility];  Service: Surgical Oncology    COLPOSCOPY      LYMPH NODE BIOPSY Left 9/24/2019    Procedure: SENTINEL LYMPH NODE BIOPSY; LYMPHATIC MAPPING WITH BLUE DYE AND RADIOACTIVE DYE (INJECT AT 1130 BY DR COLEMAN IN THE OR);   Surgeon: Melody Wellington MD;  [facility];  Service: Surgical Oncology    MAMMO NEEDLE LOCALIZATION LEFT (ALL INC) Left 9/24/2019    US GUIDED BREAST BIOPSY LEFT COMPLETE Left 8/13/2019    US GUIDED BREAST LYMPH NODE BIOPSY LEFT Left 8/13/2019     Family History   Problem Relation Age of Onset    Stomach cancer Mother 37    Diabetes Maternal Grandmother     No Known Problems Father     No Known Problems Sister      Social History     Socioeconomic History    Marital status: /Civil Union     Spouse name: Not on file    Number of children: Not on file    Years of education: Not on file    Highest education level: Not on file   Occupational History    Not on file   Social Needs    Financial resource strain: Not on file    Food insecurity     Worry: Not on file     Inability: Not on file   [employer] needs     Medical: Not on file     Non-medical: Not on file   Tobacco Use    Smoking status: Former Smoker    Smokeless tobacco: Never Used    Tobacco comment: only social smoking many years ago   Substance and Sexual Activity    Alcohol use: Not Currently     Frequency: 2-4 times a month     Drinks per session: 1 or 2    Drug use: No    Sexual activity: Yes   Lifestyle    Physical activity     Days per week: Not on file Minutes per session: Not on file    Stress: Not on file   Relationships    Social connections     Talks on phone: Not on file     Gets together: Not on file     Attends [employer] service: Not on file     Active member of club or organization: Not on file     Attends meetings of clubs or organizations: Not on file     Relationship status: Not on file    Intimate partner violence     Fear of current or ex partner: Not on file     Emotionally abused: Not on file     Physically abused: Not on file     Forced sexual activity: Not on file   Other Topics Concern    Not on file   Social History Narrative    Not on file       Current Outpatient Medications:     Ascorbic Acid (VITAMIN C PO), Take 1 tablet by mouth daily, Disp: , Rfl:     CRANIAL PROSTHESIS, RX,, One wig as needed, Disp: 1 Piece, Rfl: 0    Cyanocobalamin (VITAMIN B-12 PO), Take 1 tablet by mouth daily, Disp: , Rfl:     escitalopram (LEXAPRO) 10 mg tablet, Take 1 tablet (10 mg total) by mouth daily, Disp: 30 tablet, Rfl: 0    Multiple Vitamins-Minerals (MULTIVITAMIN WOMEN) TABS, Take 1 tablet by mouth daily, Disp: , Rfl:     ondansetron (ZOFRAN) 4 mg tablet, Take 1 tablet (4 mg total) by mouth every 8 (eight) hours as needed for nausea or vomiting, Disp: 20 tablet, Rfl: 1    oxyCODONE-acetaminophen (PERCOCET) 5-325 mg per tablet, Take 1 tablet by mouth every 6 (six) hours as needed for moderate painMax Daily Amount: 4 tablets, Disp: 6 tablet, Rfl: 0    Pyridoxine HCl (VITAMIN B-6) 25 MG tablet, Take by mouth, Disp: , Rfl:     tamoxifen (NOLVADEX) 20 mg tablet, Take 1 tablet (20 mg total) by mouth daily, Disp: 90 tablet, Rfl: 1    EPINEPHrine (EPIPEN) 0 3 mg/0 3 mL SOAJ, Inject 0 3 mL (0 3 mg total) into a muscle once for 1 dose, Disp: 0 6 mL, Rfl: 0  Allergies   Allergen Reactions    Bee Venom      Vitals:    09/30/20 1530   BP: 126/68   Pulse: 83   Resp: 18   Temp: (!) 97 4 °F (36 3 °C)       Physical Exam  Vitals signs reviewed  Constitutional:       General: She is not in acute distress  Appearance: Normal appearance  She is well-developed  She is not diaphoretic  HENT:      Head: Normocephalic and atraumatic  Neck:      Musculoskeletal: Normal range of motion  Cardiovascular:      Rate and Rhythm: Normal rate and regular rhythm  Heart sounds: Normal heart sounds  Pulmonary:      Effort: Pulmonary effort is normal       Breath sounds: Normal breath sounds  Chest:      Breasts:         Right: No swelling, bleeding, inverted nipple, mass, nipple discharge, skin change or tenderness  Left: Skin change (Surgical scar) and tenderness ( anterior and lateral chest wall musculature) present  No swelling, bleeding, inverted nipple, mass or nipple discharge  Abdominal:      Palpations: Abdomen is soft  There is no mass  Tenderness: There is no abdominal tenderness  Musculoskeletal: Normal range of motion  Lymphadenopathy:      Upper Body:      Right upper body: No supraclavicular or axillary adenopathy  Left upper body: No supraclavicular or axillary adenopathy  Skin:     General: Skin is warm and dry  Findings: No rash  Neurological:      Mental Status: She is alert and oriented to person, place, and time  Psychiatric:         Speech: Speech normal          Results:    Imaging  Mammo Diagnostic Bilateral W 3d & Cad    Result Date: 9/2/2020  Narrative: DIAGNOSIS: Malignant neoplasm of upper-outer quadrant of left breast in female, estrogen receptor positive ([facility] [address] ) TECHNIQUE: Digital screening mammography was performed  Computer Aided Detection (CAD) analyzed all applicable images  COMPARISONS: None available  RELEVANT HISTORY: Family Breast Cancer History: No known family history of breast cancer  Family Medical History: No known relevant family medical history  Personal History: Hormone history includes birth control, other and tamoxifen  Surgical history includes breast biopsy and lumpectomy  Medical history includes breast cancer and history of chemotherapy  RISK ASSESSMENT: 5 Year Tyrer-Cuzick: 1 04 % 10 Year Tyrer-Cuzick: 2 58 % Lifetime Tyrer-Cuzick: 18 38 % TISSUE DENSITY: There are scattered areas of fibroglandular density  INDICATION: Pérez Arnold is a 41 y o  female presenting for annual  FINDINGS: LEFT 1) POST-SURGICAL FINDING: There are post-surgical findings from a previous lumpectomy with radiation seen in the upper outer quadrant of the left breast in the posterior depth  Expected post treatment architectural distortion and associated focal asymmetry  There has been no interval development of a suspicious mass, microcalcification, or architectural distortion  RIGHT 3) LYMPH NODE: There is an intramammary lymph node seen in the upper outer quadrant of the right breast in the posterior depth  Clear reniform shape and fatty notch  This is a benign finding  There are no suspicious masses, grouped microcalcifications or areas of architectural distortion  The skin and nipple areolar complex are unremarkable  Impression:  Post treatment changes in the left breast without evidence for malignancy in either breast   Recommend surveillance with bilateral diagnostic mammogram in 12 months  ASSESSMENT/BI-RADS CATEGORY: Left: 2 - Benign Right: 2 - Benign Overall: 2 - Benign RECOMMENDATION:      - Diagnostic mammogram in 1 year for both breasts  Workstation ID: [id number]      I reviewed the above imaging data  Advance Care Planning/Advance Directives:  Discussed disease status, cancer treatment plans and/or cancer treatment goals with the patient

## 2020-09-30 NOTE — PROGRESS NOTES
Hematology / Oncology Outpatient Follow Up Note    Marilyn Carlos 41 y o  female XIJ:[birth year] STEWARTVISHAL:[id number]         Date:  9/30/2020    Assessment / Plan:  A 77-year-old premenopausal woman with stage IA left breast cancer, grade 2, ER 90% positive, ND 60% positive, HER2 negative disease   She is negative for BRCA gene mutation   She underwent lumpectomy and sentinel lymph node biopsy, resulting in LAVON   Her tumor was found to be high risk based on a MammaPrint and luminal B type  She completed adjuvant chemotherapy with Taxotere and cyclophosphamide as well as radiation therapy  She has no evidence recurrent disease, based on her symptomatology and physical examination  I recommended her to continue with tamoxifen 20 mg daily  She is in agreement with my recommendation  I will see her again in 6 months for routine follow-up        Subjective:      HPI:  A 77-year-old premenopausal woman who noticed left breast lump 5 months ago  Arthur Moore brought to medical attention   When she was found her lump, she was pregnant  Arthur Moore was found to have radiographic abnormality in her left breast which was biopsied in August 13, 2019 which showed invasive ductal carcinoma, grade 2   This was ER 90 % positive, ND 60% positive, HER2 negative disease   Subsequently, she delivered baby few weeks after her biopsy   She was seen by Dr Juanjo Moore was negative for BRCA gene mutation  Ileana Kapoor, she underwent left lumpectomy in September 24, 2019 which showed 19 mm of invasive ductal carcinoma, grade 2  3 sentinel lymph nodes were all negative for metastatic disease   Dr Juanjo James sent her tumor tissue for MammaPrint which came back as high risk, luminal B type   She presents today with her  who is a emergency department physician at [employer] [facility] to discuss adjuvant treatment options   She has no past medical history   She feels well   She has no complaint of pain   Her weight is stable   She has no respiratory symptoms  Arthur Moore is a lifetime never smoker  Re Crew has no family history of breast or ovarian cancer   Her performance status is normal              Interval History:   A 40-year-old premenopausal woman with stage IA left breast cancer, grade 2, ER 90% positive, MA 60% positive, HER2 negative disease   She is negative for BRCA gene mutation   She underwent lumpectomy and sentinel lymph node biopsy, resulting in LAVON    Her tumor was found to be high risk, based on a MammaPrint   Therefore, she was treated with 4 cycle of adjuvant chemotherapy with Taxotere and cyclophosphamide which she completed in January 2020  Since March 2020, she has been on adjuvant hormonal therapy with tamoxifen  She presents today for routine follow-up  Her menstrual cycle has not resumed yet  However, she has no complaint hot flashes  She denied any pain  She has no respiratory symptoms  She underwent mammography in early September 2020 which was benign    Her performance status is normal         Objective:      Primary Diagnosis:     1 Left breast cancer, stage IA (pT1c, pN0, M0) grade 2, ER 90% positive, MA 60% positive, HER2 negative disease   MammaPrint high risk   Diagnosed in September 2019    2  BRCA gene mutation negative      Cancer Staging:  Cancer Staging  Malignant neoplasm of upper-outer quadrant of left breast in female, estrogen receptor positive ([facility] [address] )  Staging form: Breast, AJCC 8th Edition  - Pathologic: Stage IA (pT1c, pN0(sn), cM0, G2, ER+, MA+, HER2-) - Unsigned  Neoadjuvant therapy: No  Laterality: Left  Tumor size (mm): 19  Method of lymph node assessment: Hathaway lymph node biopsy  Histologic grading system: 3 grade system           Previous Hematologic/ Oncologic Treatment:       Adjuvant chemotherapy with Taxotere and cyclophosphamide x4 cycle, completed in early January 2020       Current Hematologic/ Oncologic Treatment:       Adjuvant hormonal therapy with tamoxifen since March 2020       Disease Status:      LAVON status post lumpectomy and sentinel lymph node biopsy      Test Results:     Pathology:      1 9 x 1 8 x 1 8 cm of invasive ductal carcinoma, grade 2  3 sentinel lymph nodes were negative for metastatic disease   ER 90% positive, TX 60% positive, HER2 negative disease   MammaPrint high risk, luminal B type   Stage IA (pT1c, pN0, M0)       Radiology:     Mammography in September 2020 was benign  BI-RADS 2       Laboratory:     See below      Physical Exam:        General Appearance:    Alert, oriented          Eyes:    PERRL   Ears:    Normal external ear canals, both ears   Nose:   Nares normal, septum midline   Throat:   Mucosa moist  Pharynx without injection  Neck:   Supple         Lungs:     Clear to auscultation bilaterally   Chest Wall:    No tenderness or deformity    Heart:    Regular rate and rhythm         Abdomen:     Soft, non-tender, bowel sounds +, no organomegaly               Extremities:   Extremities no cyanosis or edema         Skin:   no rash or icterus  Lymph nodes:   Cervical, supraclavicular, and axillary nodes normal   Neurologic:   CNII-XII intact, normal strength, sensation and reflexes     Throughout             Breast exam:   Lumpectomy scar at upper outer quadrant of her left breast with no palpable abnormalities   Right breast exam is negative  ROS: Review of Systems   All other systems reviewed and are negative  Imaging: Mammo Diagnostic Bilateral W 3d & Cad    Result Date: 9/2/2020  Narrative: DIAGNOSIS: Malignant neoplasm of upper-outer quadrant of left breast in female, estrogen receptor positive ([facility] [address] ) TECHNIQUE: Digital screening mammography was performed  Computer Aided Detection (CAD) analyzed all applicable images  COMPARISONS: None available  RELEVANT HISTORY: Family Breast Cancer History: No known family history of breast cancer  Family Medical History: No known relevant family medical history   Personal History: Hormone history includes birth control, other and tamoxifen  Surgical history includes breast biopsy and lumpectomy  Medical history includes breast cancer and history of chemotherapy  RISK ASSESSMENT: 5 Year Tyrer-Cuzick: 1 04 % 10 Year Tyrer-Cuzick: 2 58 % Lifetime Tyrer-Cuzick: 18 38 % TISSUE DENSITY: There are scattered areas of fibroglandular density  INDICATION: Brunilda Price is a 41 y o  female presenting for annual  FINDINGS: LEFT 1) POST-SURGICAL FINDING: There are post-surgical findings from a previous lumpectomy with radiation seen in the upper outer quadrant of the left breast in the posterior depth  Expected post treatment architectural distortion and associated focal asymmetry  There has been no interval development of a suspicious mass, microcalcification, or architectural distortion  RIGHT 3) LYMPH NODE: There is an intramammary lymph node seen in the upper outer quadrant of the right breast in the posterior depth  Clear reniform shape and fatty notch  This is a benign finding  There are no suspicious masses, grouped microcalcifications or areas of architectural distortion  The skin and nipple areolar complex are unremarkable  Impression:  Post treatment changes in the left breast without evidence for malignancy in either breast   Recommend surveillance with bilateral diagnostic mammogram in 12 months  ASSESSMENT/BI-RADS CATEGORY: Left: 2 - Benign Right: 2 - Benign Overall: 2 - Benign RECOMMENDATION:      - Diagnostic mammogram in 1 year for both breasts   Workstation ID: [id number]        Labs:   Lab Results   Component Value Date    WBC 5 06 02/11/2020    HGB 12 6 02/11/2020    HCT 39 1 02/11/2020    MCV 90 02/11/2020     02/11/2020     Lab Results   Component Value Date    K 4 3 01/08/2020     (H) 01/08/2020    CO2 30 01/08/2020    BUN 19 01/08/2020    CREATININE 0 85 01/08/2020    GLUF 82 09/15/2019    CALCIUM 8 8 01/08/2020    AST 18 01/08/2020    ALT 26 01/08/2020    ALKPHOS 51 01/08/2020    EGFR 85 01/08/2020 Current Medications: Reviewed  Allergies: Reviewed  PMH/FH/SH:  Reviewed      Vital Sign:    Body surface area is 1 98 meters squared      Wt Readings from Last 3 Encounters:   09/30/20 79 8 kg (176 lb)   09/23/20 79 8 kg (176 lb)   09/02/20 81 2 kg (179 lb)        Temp Readings from Last 3 Encounters:   09/30/20 (!) 96 7 °F (35 9 °C) (Tympanic Core)   09/02/20 97 6 °F (36 4 °C)   03/10/20 (!) 97 3 °F (36 3 °C) (Oral)        BP Readings from Last 3 Encounters:   09/30/20 126/68   09/23/20 100/70   03/10/20 100/70         Pulse Readings from Last 3 Encounters:   09/30/20 83   03/10/20 65   03/10/20 65     @LASTSAO2(3)@

## 2020-10-05 LAB
HPV HR 12 DNA CVX QL NAA+PROBE: POSITIVE
HPV16 DNA CVX QL NAA+PROBE: NEGATIVE
HPV18 DNA CVX QL NAA+PROBE: NEGATIVE
LAB AP GYN PRIMARY INTERPRETATION: NORMAL
Lab: NORMAL

## 2020-10-28 DIAGNOSIS — H02.403 PTOSIS OF BOTH EYELIDS: Primary | ICD-10-CM

## 2020-12-10 ENCOUNTER — TELEPHONE (OUTPATIENT)
Dept: SLEEP CENTER | Facility: CLINIC | Age: 40
End: 2020-12-10

## 2020-12-23 ENCOUNTER — CONSULT (OUTPATIENT)
Dept: PLASTIC SURGERY | Facility: CLINIC | Age: 40
End: 2020-12-23
Payer: COMMERCIAL

## 2020-12-23 ENCOUNTER — TELEPHONE (OUTPATIENT)
Dept: HEMATOLOGY ONCOLOGY | Facility: MEDICAL CENTER | Age: 40
End: 2020-12-23

## 2020-12-23 DIAGNOSIS — C50.412 MALIGNANT NEOPLASM OF UPPER-OUTER QUADRANT OF LEFT BREAST IN FEMALE, ESTROGEN RECEPTOR POSITIVE (HCC): ICD-10-CM

## 2020-12-23 DIAGNOSIS — H02.30 EXCESS SKIN OF EYELID, UNSPECIFIED LATERALITY: Primary | ICD-10-CM

## 2020-12-23 DIAGNOSIS — Z17.0 MALIGNANT NEOPLASM OF UPPER-OUTER QUADRANT OF LEFT BREAST IN FEMALE, ESTROGEN RECEPTOR POSITIVE (HCC): ICD-10-CM

## 2020-12-23 PROCEDURE — 99244 OFF/OP CNSLTJ NEW/EST MOD 40: CPT | Performed by: SURGERY

## 2020-12-23 RX ORDER — TAMOXIFEN CITRATE 20 MG/1
20 TABLET ORAL DAILY
Qty: 90 TABLET | Refills: 1 | Status: SHIPPED | OUTPATIENT
Start: 2020-12-23 | End: 2021-03-31 | Stop reason: SDUPTHER

## 2020-12-23 RX ORDER — TAMOXIFEN CITRATE 20 MG/1
20 TABLET ORAL 2 TIMES DAILY
OUTPATIENT
Start: 2020-12-23

## 2021-02-19 ENCOUNTER — TELEPHONE (OUTPATIENT)
Dept: SLEEP CENTER | Facility: CLINIC | Age: 41
End: 2021-02-19

## 2021-02-19 NOTE — TELEPHONE ENCOUNTER
----- Message from Laura Polo MD sent at 1/20/2021 11:02 AM EST -----  Approved  ----- Message -----  From: Shelton Harmon  Sent: 1/18/2021   3:13 PM EST  To: Sleep Medicine [place] Provider    This sleep study needs approval      If approved please sign and return to clerical pool  If denied please include reasons why  Also provide alternative testing if warranted  Please sign and return to clerical pool

## 2021-02-25 ENCOUNTER — TELEPHONE (OUTPATIENT)
Dept: PLASTIC SURGERY | Facility: CLINIC | Age: 41
End: 2021-02-25

## 2021-02-25 NOTE — TELEPHONE ENCOUNTER
Patients  called regarding patients appointment  Several were offered,due to his work schedule can only come with her to a appointment Wednesday afternoon  Advised Dr Meneses Dates will be in the  Office on March 1st and we would ask for appointment time to follow up and will call

## 2021-02-25 NOTE — TELEPHONE ENCOUNTER
OFFERED PT NUMEROUS APPTS AND SHE REFUSED THEM ALL  I EXPLAINED SHE WILL NEED TO BE SEEN DUE TO HER SX BEING ON 4/27/21  PT WILL WAIT TO SPEAK WITH

## 2021-02-26 ENCOUNTER — HOSPITAL ENCOUNTER (OUTPATIENT)
Dept: SLEEP CENTER | Facility: CLINIC | Age: 41
Discharge: HOME | End: 2021-02-26
Payer: COMMERCIAL

## 2021-02-26 DIAGNOSIS — G47.30 SLEEP-DISORDERED BREATHING: ICD-10-CM

## 2021-02-26 PROCEDURE — G0399 HOME SLEEP TEST/TYPE 3 PORTA: HCPCS

## 2021-02-27 NOTE — PROGRESS NOTES
Home Sleep Study Documentation    Pre-Sleep Home Study:    Set-up and instructions performed by: Darline Mclaughlin    Technician performed demonstration for Patient: yes    Return demonstration performed by Patient: yes    Written instructions provided to Patient: yes    Patient signed consent form: yes    Set-up and instructions were given to the patients     Post-Sleep Home Study:    Additional comments by Patient: None    Home Sleep Study Failed:no:    Failure reason: N/A    Reported or Detected: N/A    Scored by: JOSÉ LUIS Marin

## 2021-03-05 ENCOUNTER — TELEPHONE (OUTPATIENT)
Dept: SLEEP CENTER | Facility: CLINIC | Age: 41
End: 2021-03-05

## 2021-03-24 ENCOUNTER — OFFICE VISIT (OUTPATIENT)
Dept: PLASTIC SURGERY | Facility: CLINIC | Age: 41
End: 2021-03-24
Payer: COMMERCIAL

## 2021-03-24 VITALS
HEART RATE: 64 BPM | BODY MASS INDEX: 26.39 KG/M2 | TEMPERATURE: 97.3 F | WEIGHT: 188.5 LBS | DIASTOLIC BLOOD PRESSURE: 77 MMHG | SYSTOLIC BLOOD PRESSURE: 129 MMHG | HEIGHT: 71 IN

## 2021-03-24 DIAGNOSIS — H02.30 EXCESS SKIN OF EYELID, UNSPECIFIED LATERALITY: Primary | ICD-10-CM

## 2021-03-24 PROCEDURE — 99214 OFFICE O/P EST MOD 30 MIN: CPT | Performed by: SURGERY

## 2021-03-24 NOTE — PROGRESS NOTES
Assessment/Plan:  Please see HPI  We discussed upper eyelid blepharoplasty, and lateral/temporal brow lift in detail, where the incisions/scars will be located, as well as potential risks, complications, and limitations including, but not limited to infection, bleeding, scarring, bruising, eyelid malposition, brow weakness, injury to the frontal branch of the facial nerve, the benefit of Botox following the procedures, etcetera etcetera  She would also like to have a skin lesion the lateral chest excised at the same time  She has also inquired about lower eyelid blepharoplasty, after lengthy discussion we have mutually agreed to perform the upper eyelid blepharoplasty and brow lift initially, she will consider lower eyelid blepharoplasty at some point in the future  The same holds for potential christina plasty/rhinoplasty  Their questions were answered to their satisfaction and consent was obtained  There are no diagnoses linked to this encounter  Subjective:  Excess skin upper eyelids, visual field cut, skin lesion/acrochordon right lateral chest     Patient ID: Rafaela Leiva is a 35 y o  female  HPI she has been approved for bilateral upper eyelid blepharoplasty and lateral/temporal brow lift, she presents for a preoperative visit    The following portions of the patient's history were reviewed and updated as appropriate: allergies, current medications, past family history, past medical history, past social history, past surgical history and problem list     Review of Systems   Constitutional: Negative for chills and fever  HENT: Negative for hearing loss  Eyes: Positive for visual disturbance  Negative for discharge  Respiratory: Negative for chest tightness and shortness of breath  Cardiovascular: Negative for chest pain and leg swelling  Gastrointestinal: Negative for blood in stool, constipation, diarrhea and nausea  Genitourinary: Negative for dysuria     Musculoskeletal: Negative for gait problem  Skin: Negative for rash  Allergic/Immunologic: Negative for immunocompromised state  Neurological: Negative for seizures and headaches  Hematological: Does not bruise/bleed easily  Psychiatric/Behavioral: Positive for dysphoric mood  The patient is nervous/anxious  Objective:      /77   Pulse 64   Temp (!) 97 3 °F (36 3 °C)   Ht 5' 11" (1 803 m)   Wt 85 5 kg (188 lb 8 oz)   BMI 26 29 kg/m²          Physical Exam  Constitutional:       Appearance: Normal appearance  HENT:      Head: Normocephalic and atraumatic  Eyes:      Extraocular Movements: Extraocular movements intact  Pupils: Pupils are equal, round, and reactive to light  Comments: Excess skin upper eyelids, prominent medial fat compartments, prominent orbicularis lower lids, prominent fat compartments with hyperpigmentation, see photos from previous visit   Neck:      Musculoskeletal: Normal range of motion and neck supple  Cardiovascular:      Rate and Rhythm: Normal rate  Pulmonary:      Effort: Pulmonary effort is normal    Abdominal:      Palpations: Abdomen is soft  Musculoskeletal: Normal range of motion  Skin:     General: Skin is warm  Neurological:      Mental Status: She is alert and oriented to person, place, and time     Psychiatric:         Mood and Affect: Mood normal

## 2021-03-31 ENCOUNTER — TELEPHONE (OUTPATIENT)
Dept: HEMATOLOGY ONCOLOGY | Facility: MEDICAL CENTER | Age: 41
End: 2021-03-31

## 2021-03-31 ENCOUNTER — OFFICE VISIT (OUTPATIENT)
Dept: SURGICAL ONCOLOGY | Facility: CLINIC | Age: 41
End: 2021-03-31
Payer: COMMERCIAL

## 2021-03-31 ENCOUNTER — OFFICE VISIT (OUTPATIENT)
Dept: HEMATOLOGY ONCOLOGY | Facility: CLINIC | Age: 41
End: 2021-03-31
Payer: COMMERCIAL

## 2021-03-31 VITALS
HEIGHT: 70 IN | TEMPERATURE: 97 F | SYSTOLIC BLOOD PRESSURE: 124 MMHG | BODY MASS INDEX: 25.2 KG/M2 | WEIGHT: 176 LBS | HEART RATE: 97 BPM | OXYGEN SATURATION: 97 % | DIASTOLIC BLOOD PRESSURE: 64 MMHG | RESPIRATION RATE: 18 BRPM

## 2021-03-31 DIAGNOSIS — Z79.810 USE OF TAMOXIFEN (NOLVADEX): ICD-10-CM

## 2021-03-31 DIAGNOSIS — Z17.0 MALIGNANT NEOPLASM OF UPPER-OUTER QUADRANT OF LEFT BREAST IN FEMALE, ESTROGEN RECEPTOR POSITIVE (HCC): Primary | ICD-10-CM

## 2021-03-31 DIAGNOSIS — C50.412 MALIGNANT NEOPLASM OF UPPER-OUTER QUADRANT OF LEFT BREAST IN FEMALE, ESTROGEN RECEPTOR POSITIVE (HCC): Primary | ICD-10-CM

## 2021-03-31 PROCEDURE — 99214 OFFICE O/P EST MOD 30 MIN: CPT | Performed by: SURGERY

## 2021-03-31 PROCEDURE — 99214 OFFICE O/P EST MOD 30 MIN: CPT | Performed by: INTERNAL MEDICINE

## 2021-03-31 RX ORDER — TAMOXIFEN CITRATE 20 MG/1
20 TABLET ORAL DAILY
Qty: 90 TABLET | Refills: 3 | Status: SHIPPED | OUTPATIENT
Start: 2021-03-31

## 2021-03-31 NOTE — TELEPHONE ENCOUNTER
Patient  called in to advised that they are running 10-15 min late  - reached out to the office and advised

## 2021-03-31 NOTE — PROGRESS NOTES
Surgical Oncology Follow Up       [address] [facility] [facility]  [facility] [place]  [address]  [facility] [place] [address]    Radha Deal  1980  [id number]  [address]  [facility] [place]  [address] [facility] [place] [address]    No chief complaint on file  Assessment & Plan:     The patient presents today for a follow-up visit  She has seen Dr Parris Braun earlier today  Clinical exam shows no evidence of local regional distant recurrence disease  She does complain of hot flashes on her tamoxifen otherwise she has no complaints  She has a good cosmetic outcome  She presents with her  who was very informative  Cancer History:     Oncology History   Malignant neoplasm of upper-outer quadrant of left breast in female, estrogen receptor positive ([facility] [address] )   8/13/2019 Biopsy    Left breast ultrasound-guided biopsy  A  2 o'clock, 14 cm from nipple  Invasive mammary carcinoma of no special type (ductal, not otherwise specified)  Grade 2  ER 90  NV 60  HER2 1+    B  Left axillary lymph node  Portion of lymph node, negative for carcinoma     8/16/2019 Genetic Testing    The following genes were evaluated: SUDHA, BRCA1, BRCA2, CDH1, CHEK2, PALB2, PTEN, STK11, TP53  Negative result   No pathogenic sequence variants or deletions/dupllications identified  [facility]     9/24/2019 Surgery    Left breast needle localized lumpectomy with sentinel lymph node biopsy  Invasive breast carcinoma of no special type (ductal NST)  Grade 2  1 9 cm  Margins negative  0/3 Lymph nodes  Anatomic/Prognostic Stage IA     10/11/2019 Genomic Testing    MammaPrint   High Risk     11/4/2019 -  Chemotherapy    cyclophosphamide (CYTOXAN) 1,200 mg in sodium chloride 0 9 % 250 mL IVPB, 600 mg/m2 = 1,200 mg, Intravenous, Once, 4 of 4 cycles  Administration: 1,200 mg (11/4/2019), 1,200 mg (11/22/2019), 1,200 mg (12/16/2019), 1,200 mg (1/10/2020)  DOCEtaxel (TAXOTERE) 150 mg in sodium chloride 0 9 % 250 mL chemo infusion, 75 mg/m2 = 150 mg, Intravenous, Once, 4 of 4 cycles  Administration: 150 mg (2019), 150 mg (2019), 150 mg (2019), 150 mg (1/10/2020)       2020 - 3/9/2020 Radiation    Course: C1    Plan ID Energy Fractions Dose per Fraction (cGy) Dose Correction (cGy) Total Dose Delivered (cGy) Elapsed Days   BH L BREAST 6X 15 / 15 267 0 4,005 20   L BRST BOOST 9E  200 0 1,000 6      Treatment dates:  C1: 2020 - 3/9/2020       3/2020 -  Hormone Therapy    Tamoxifen 20 mg PO daily           Interval History:    see above  The patient is overall doing well with the exception of her hot flashes  She has finished her radiation therapy as well as her chemotherapy  Review of Systems:   Review of Systems   Constitutional: Negative for activity change, appetite change and fatigue  Hot flashes   HENT: Negative  Eyes: Negative  Respiratory: Negative for cough, shortness of breath and wheezing  Cardiovascular: Negative for chest pain and leg swelling  Gastrointestinal: Negative  Endocrine: Negative  Genitourinary: Negative  Musculoskeletal:        No new changes or complaints of bone pain   Skin: Negative  Allergic/Immunologic: Negative  Neurological: Negative  Hematological: Negative  Psychiatric/Behavioral: Negative          Past Medical History     Patient Active Problem List   Diagnosis    AMA (advanced maternal age) multigravida 33+    Previous  labor affecting pregnancy, antepartum    39 weeks gestation of pregnancy    Positive GBS test    Malignant neoplasm of upper-outer quadrant of left breast in female, estrogen receptor positive ([address] )    Forceps delivery with baby delivered    Use of tamoxifen (Nolvadex)    Obstructive sleep apnea    Sleep-disordered breathing     Past Medical History:   Diagnosis Date    Abnormal Pap smear of cervix     BRCA gene mutation negative 2019 [facility]    History of chemotherapy 12/2019    HPV (human papilloma virus) infection     Papanicolaou smear 12/28/2017    Postpartum depression     As per patient's spouse "it got severe"    Primary breast malignancy, left ([address] ) 08/14/2019    Rh negative state in antepartum period     Varicella     Vaccinated     Past Surgical History:   Procedure Laterality Date    BREAST BIOPSY Left 08/13/2019    us bx    BREAST LUMPECTOMY Left 9/24/2019    Procedure: BREAST NEEDLE LOCALIZED LUMPECTOMY (NEEDLE LOC AT 0930); Surgeon: Birttani Paniagua MD;  [facility];  Service: Surgical Oncology    COLPOSCOPY      LYMPH NODE BIOPSY Left 9/24/2019    Procedure: SENTINEL LYMPH NODE BIOPSY; LYMPHATIC MAPPING WITH BLUE DYE AND RADIOACTIVE DYE (INJECT AT 1130 BY DR COLEMAN IN THE OR);   Surgeon: Brittani Paniagua MD;  [facility];  Service: Surgical Oncology    MAMMO NEEDLE LOCALIZATION LEFT (ALL INC) Left 9/24/2019    US GUIDED BREAST BIOPSY LEFT COMPLETE Left 8/13/2019    US GUIDED BREAST LYMPH NODE BIOPSY LEFT Left 8/13/2019     Family History   Problem Relation Age of Onset    Stomach cancer Mother 37    Diabetes Maternal Grandmother     No Known Problems Father     No Known Problems Sister     Cancer Other      Social History     Socioeconomic History    Marital status: /Civil Union     Spouse name: Not on file    Number of children: Not on file    Years of education: Not on file    Highest education level: Not on file   Occupational History    Not on file   Social Needs    Financial resource strain: Not on file    Food insecurity     Worry: Not on file     Inability: Not on file   [employer] needs     Medical: Not on file     Non-medical: Not on file   Tobacco Use    Smoking status: Former Smoker    Smokeless tobacco: Never Used    Tobacco comment: only social smoking many years ago   Substance and Sexual Activity    Alcohol use: Not Currently     Frequency: 2-4 times a month Drinks per session: 1 or 2    Drug use: No    Sexual activity: Yes   Lifestyle    Physical activity     Days per week: Not on file     Minutes per session: Not on file    Stress: Not on file   Relationships    Social connections     Talks on phone: Not on file     Gets together: Not on file     Attends Gnosticism service: Not on file     Active member of club or organization: Not on file     Attends meetings of clubs or organizations: Not on file     Relationship status: Not on file    Intimate partner violence     Fear of current or ex partner: Not on file     Emotionally abused: Not on file     Physically abused: Not on file     Forced sexual activity: Not on file   Other Topics Concern    Not on file   Social History Narrative    Not on file       Current Outpatient Medications:     Multiple Vitamins-Minerals (MULTIVITAMIN WOMEN) TABS, Take 1 tablet by mouth daily, Disp: , Rfl:     Pyridoxine HCl (VITAMIN B-6) 25 MG tablet, Take by mouth, Disp: , Rfl:     tamoxifen (NOLVADEX) 20 mg tablet, Take 1 tablet (20 mg total) by mouth daily, Disp: 90 tablet, Rfl: 3  Allergies   Allergen Reactions    Bee Venom        Physical Exam:   There were no vitals filed for this visit  Physical Exam  Vitals signs reviewed  Constitutional:       Appearance: She is well-developed  HENT:      Head: Normocephalic and atraumatic  Eyes:      Pupils: Pupils are equal, round, and reactive to light  Neck:      Musculoskeletal: Normal range of motion and neck supple  Thyroid: No thyromegaly  Vascular: No JVD  Trachea: No tracheal deviation  Cardiovascular:      Rate and Rhythm: Normal rate and regular rhythm  Heart sounds: Normal heart sounds  No murmur  No friction rub  No gallop  Pulmonary:      Effort: Pulmonary effort is normal  No respiratory distress  Breath sounds: Normal breath sounds  No wheezing or rales  Chest:          Comments:    The right breast was examined in the sitting and supine position  There are no worrisome skin changes, tenderness, inverted nipple, nipple discharge, swelling, bleeding or evidence of a mass in any quadrant  Marilee survey demonstrated no evidence of any clinically suspicious axillary, pectoral or paraclavicular lymph nodes  The left breast was examined in the sitting and supine position  There are no worrisome skin changes, tenderness, inverted nipple, nipple discharge, swelling, bleeding or evidence of a mass in any quadrant  Marilee survey demonstrated no evidence of any clinically suspicious axillary, pectoral or paraclavicular lymph nodes  Abdominal:      General: There is no distension  Palpations: Abdomen is soft  There is no hepatomegaly or mass  Tenderness: There is no abdominal tenderness  There is no guarding or rebound  Musculoskeletal: Normal range of motion  General: No tenderness  Lymphadenopathy:      Cervical: No cervical adenopathy  Skin:     General: Skin is warm and dry  Findings: No erythema or rash  Neurological:      Mental Status: She is alert and oriented to person, place, and time  Cranial Nerves: No cranial nerve deficit  Psychiatric:         Behavior: Behavior normal            Results & Discussion:     The patient is free of any evidence of local regional or distant recurrence disease  She has completed her systemic therapy with the exception of continuing on tamoxifen which is causing her hot flashes  We will coordinate her mammograms for September and see her back shortly thereafter  Advance Care Planning/Advance Directives:  I discussed the disease status, treatment plans and follow-up with the patient

## 2021-03-31 NOTE — PROGRESS NOTES
Hematology / Oncology Outpatient Follow Up Note    Pascale King 35 y o  female DXQ:[id number] ALT:[id number]         Date:  3/31/2021    Assessment / Plan:  A 24-year-old premenopausal woman with stage IA left breast cancer, grade 2, ER 90% positive, IL 60% positive, HER2 negative disease   She is negative for BRCA gene mutation   She underwent lumpectomy and sentinel lymph node biopsy, resulting in LAVON   Her tumor was found to be high risk based on a MammaPrint and luminal B type   She completed adjuvant chemotherapy with Taxotere and cyclophosphamide as well as radiation therapy  She is currently on adjuvant hormonal therapy with tamoxifen with excellent tolerance  She clinically, she has no evidence recurrent disease  I recommended her to continue with tamoxifen 20 mg daily  She is in agreement with my recommendation  I will see her again in 6 months for routine follow-up  Subjective:      HPI:  A 24-year-old premenopausal woman who noticed left breast lump 5 months ago  Gasper Goetz brought to medical attention   When she was found her lump, she was pregnant  Gasper Goetz was found to have radiographic abnormality in her left breast which was biopsied in August 13, 2019 which showed invasive ductal carcinoma, grade 2   This was ER 90 % positive, IL 60% positive, HER2 negative disease   Subsequently, she delivered baby few weeks after her biopsy   She was seen by Dr Ethel Small  Gasper Goetz was negative for BRCA gene mutation  [place]ace Wan, she underwent left lumpectomy in September 24, 2019 which showed 19 mm of invasive ductal carcinoma, grade 2  3 sentinel lymph nodes were all negative for metastatic disease   Dr Ethel Small sent her tumor tissue for MammaPrint which came back as high risk, luminal B type   She presents today with her  who is a emergency department physician at [facility] to discuss adjuvant treatment options   She has no past medical history   She feels well   She has no complaint of pain   Her weight is stable   She has no respiratory symptoms  Tasia Montelongo is a lifetime never smoker  Tasia Montelongo has no family history of breast or ovarian cancer   Her performance status is normal              Interval History:   A 49-year-old premenopausal woman with stage IA left breast cancer, grade 2, ER 90% positive, CO 60% positive, HER2 negative disease   She is negative for BRCA gene mutation   She underwent lumpectomy and sentinel lymph node biopsy, resulting in LAVON    Her tumor was found to be high risk, based on a MammaPrint   Therefore, she was treated with 4 cycle of adjuvant chemotherapy with Taxotere and cyclophosphamide which she completed in January 2020  Since March 2020, she has been on adjuvant hormonal therapy with tamoxifen  She presents today for routine follow-up  She only had 1 menstrual bleeding which was 5 months ago  She has mild-to-moderate hot flashes  She has significant fatigue  She denied any respiratory symptoms  She has some weight gain    Her performance status is normal        Objective:      Primary Diagnosis:     1 Left breast cancer, stage IA (pT1c, pN0, M0) grade 2, ER 90% positive, CO 60% positive, HER2 negative disease   MammaPrint high risk   Diagnosed in September 2019    2  BRCA gene mutation negative      Cancer Staging:  Cancer Staging  Malignant neoplasm of upper-outer quadrant of left breast in female, estrogen receptor positive ([facility] [address] )  Staging form: Breast, AJCC 8th Edition  - Pathologic: Stage IA (pT1c, pN0(sn), cM0, G2, ER+, CO+, HER2-) - Unsigned  Neoadjuvant therapy: No  Laterality: Left  Tumor size (mm): 19  Method of lymph node assessment: [place] lymph node biopsy  Histologic grading system: 3 grade system           Previous Hematologic/ Oncologic Treatment:       Adjuvant chemotherapy with Taxotere and cyclophosphamide x4 cycle, completed in early January 2020       Current Hematologic/ Oncologic Treatment:       Adjuvant hormonal therapy with tamoxifen since March 2020       Disease Status:      LAVON status post lumpectomy and sentinel lymph node biopsy      Test Results:     Pathology:      1 9 x 1 8 x 1 8 cm of invasive ductal carcinoma, grade 2  3 sentinel lymph nodes were negative for metastatic disease   ER 90% positive, WA 60% positive, HER2 negative disease   MammaPrint high risk, luminal B type   Stage IA (pT1c, pN0, M0)       Radiology:     Mammography in September 2020 was benign  BI-RADS 2       Laboratory:     See below      Physical Exam:        General Appearance:    Alert, oriented          Eyes:    PERRL   Ears:    Normal external ear canals, both ears   Nose:   Nares normal, septum midline   Throat:   Mucosa moist  Pharynx without injection  Neck:   Supple         Lungs:     Clear to auscultation bilaterally   Chest Wall:    No tenderness or deformity    Heart:    Regular rate and rhythm         Abdomen:     Soft, non-tender, bowel sounds +, no organomegaly               Extremities:   Extremities no cyanosis or edema         Skin:   no rash or icterus  Lymph nodes:   Cervical, supraclavicular, and axillary nodes normal   Neurologic:   CNII-XII intact, normal strength, sensation and reflexes     Throughout             Breast exam:   Lumpectomy scar at upper outer quadrant of her left breast with no palpable abnormalities   Right breast exam is negative  ROS: Review of Systems   All other systems reviewed and are negative  Imaging: No results found        Labs:   Lab Results   Component Value Date    WBC 5 06 02/11/2020    HGB 12 6 02/11/2020    HCT 39 1 02/11/2020    MCV 90 02/11/2020     02/11/2020     Lab Results   Component Value Date    K 4 3 01/08/2020     (H) 01/08/2020    CO2 30 01/08/2020    BUN 19 01/08/2020    CREATININE 0 85 01/08/2020    GLUF 82 09/15/2019    CALCIUM 8 8 01/08/2020    AST 18 01/08/2020    ALT 26 01/08/2020    ALKPHOS 51 01/08/2020    EGFR 85 01/08/2020         Current Medications: Reviewed  Allergies: Reviewed  PMH/FH/SH:  Reviewed      Vital Sign:    Body surface area is 1 98 meters squared      Wt Readings from Last 3 Encounters:   03/31/21 79 8 kg (176 lb)   03/24/21 85 5 kg (188 lb 8 oz)   11/10/20 82 6 kg (182 lb)        Temp Readings from Last 3 Encounters:   03/31/21 (!) 97 °F (36 1 °C) (Tympanic Core)   03/24/21 (!) 97 3 °F (36 3 °C)   11/10/20 (!) 96 4 °F (35 8 °C)        BP Readings from Last 3 Encounters:   03/31/21 124/64   03/24/21 129/77   09/30/20 126/68         Pulse Readings from Last 3 Encounters:   03/31/21 97   03/24/21 64   09/30/20 83     @LASTSAO2(3)@

## 2021-04-14 ENCOUNTER — OFFICE VISIT (OUTPATIENT)
Dept: OBGYN CLINIC | Facility: CLINIC | Age: 41
End: 2021-04-14
Payer: COMMERCIAL

## 2021-04-14 VITALS
BODY MASS INDEX: 26.6 KG/M2 | SYSTOLIC BLOOD PRESSURE: 130 MMHG | HEIGHT: 71 IN | WEIGHT: 190 LBS | DIASTOLIC BLOOD PRESSURE: 80 MMHG

## 2021-04-14 DIAGNOSIS — R87.810 CERVICAL HIGH RISK HPV (HUMAN PAPILLOMAVIRUS) TEST POSITIVE: Primary | ICD-10-CM

## 2021-04-14 PROCEDURE — 88175 CYTOPATH C/V AUTO FLUID REDO: CPT | Performed by: OBSTETRICS & GYNECOLOGY

## 2021-04-14 PROCEDURE — 99213 OFFICE O/P EST LOW 20 MIN: CPT | Performed by: OBSTETRICS & GYNECOLOGY

## 2021-04-14 RX ORDER — CHOLECALCIFEROL (VITAMIN D3) 25 MCG
1 CAPSULE ORAL DAILY
COMMUNITY

## 2021-04-14 NOTE — PROGRESS NOTES
Returns the office for repeat Pap smear  Patient's Pap was normal with positive high-risk HPV  Patient denies any pelvic pain  Patient is on tamoxifen for breast cancer and has not had a period since November 2020  Physical exam:  External genitalia normal   Vagina clear  Cervix no lesions  Pap smear done  Impression:  Normal Pap with positive HPV    Plan:  Check Pap smear    Return to office for yearly

## 2021-04-15 ENCOUNTER — ANESTHESIA EVENT (OUTPATIENT)
Dept: PERIOP | Facility: AMBULARY SURGERY CENTER | Age: 41
End: 2021-04-15
Payer: COMMERCIAL

## 2021-04-21 LAB
LAB AP GYN PRIMARY INTERPRETATION: NORMAL
Lab: NORMAL

## 2021-04-23 NOTE — PRE-PROCEDURE INSTRUCTIONS
Pre-Surgery Instructions:   Medication Instructions    Cholecalciferol (Vitamin D-3) 25 MCG (1000 UT) CAPS Instructed to avoid all ASA/NSAIDs and OTC Vit/Supp from now until after surgery  Tylenol ok prn    FOLIC ACID PO Instructed to avoid all ASA/NSAIDs and OTC Vit/Supp from now until after surgery  Tylenol ok prn    Multiple Vitamins-Minerals (MULTIVITAMIN WOMEN) TABS Instructed to avoid all ASA/NSAIDs and OTC Vit/Supp from now until after surgery  Tylenol ok prn    Prenatal Vit-Fe Fumarate-FA (PRENATAL PO) Instructed to avoid all ASA/NSAIDs and OTC Vit/Supp from now until after surgery  Tylenol ok prn    Pyridoxine HCl (VITAMIN B-6) 25 MG tablet Instructed to avoid all ASA/NSAIDs and OTC Vit/Supp from now until after surgery  Tylenol ok prn    tamoxifen (NOLVADEX) 20 mg tablet Instructed to take per normal schedule    Pre-op medication, and showering instructions with antibacteral soap reviewed  Instructed to avoid all ASA/NSAIDs and OTC Vit/Supp from now until after surgery  Tylenol ok prn  Pt  Verbalized an understanding of all instructions reviewed and offers no concerns at this time

## 2021-04-24 ENCOUNTER — APPOINTMENT (OUTPATIENT)
Dept: LAB | Facility: HOSPITAL | Age: 41
End: 2021-04-24
Payer: COMMERCIAL

## 2021-04-24 ENCOUNTER — APPOINTMENT (OUTPATIENT)
Dept: LAB | Facility: HOSPITAL | Age: 41
End: 2021-04-24

## 2021-04-24 ENCOUNTER — TRANSCRIBE ORDERS (OUTPATIENT)
Dept: LAB | Facility: HOSPITAL | Age: 41
End: 2021-04-24

## 2021-04-24 DIAGNOSIS — H02.30 EXCESS SKIN OF EYELID, UNSPECIFIED LATERALITY: ICD-10-CM

## 2021-04-24 DIAGNOSIS — Z00.8 HEALTH EXAMINATION IN POPULATION SURVEY: ICD-10-CM

## 2021-04-24 DIAGNOSIS — Z00.8 HEALTH EXAMINATION IN POPULATION SURVEY: Primary | ICD-10-CM

## 2021-04-24 LAB
ANION GAP SERPL CALCULATED.3IONS-SCNC: 6 MMOL/L (ref 4–13)
BASOPHILS # BLD AUTO: 0.1 THOUSANDS/ΜL (ref 0–0.1)
BASOPHILS NFR BLD AUTO: 1 % (ref 0–2)
BUN SERPL-MCNC: 19 MG/DL (ref 7–25)
CALCIUM SERPL-MCNC: 9 MG/DL (ref 8.6–10.5)
CHLORIDE SERPL-SCNC: 105 MMOL/L (ref 98–107)
CHOLEST SERPL-MCNC: 179 MG/DL (ref 0–200)
CO2 SERPL-SCNC: 28 MMOL/L (ref 21–31)
CREAT SERPL-MCNC: 0.9 MG/DL (ref 0.6–1.2)
EOSINOPHIL # BLD AUTO: 0.2 THOUSAND/ΜL (ref 0–0.61)
EOSINOPHIL NFR BLD AUTO: 4 % (ref 0–5)
ERYTHROCYTE [DISTWIDTH] IN BLOOD BY AUTOMATED COUNT: 13.5 % (ref 11.5–14.5)
EST. AVERAGE GLUCOSE BLD GHB EST-MCNC: 97 MG/DL
GFR SERPL CREATININE-BSD FRML MDRD: 79 ML/MIN/1.73SQ M
GLUCOSE P FAST SERPL-MCNC: 86 MG/DL (ref 65–99)
HBA1C MFR BLD: 5 %
HCT VFR BLD AUTO: 41.4 % (ref 42–47)
HDLC SERPL-MCNC: 83 MG/DL
HGB BLD-MCNC: 13.9 G/DL (ref 12–16)
LDLC SERPL CALC-MCNC: 86 MG/DL (ref 0–100)
LYMPHOCYTES # BLD AUTO: 1.8 THOUSANDS/ΜL (ref 0.6–4.47)
LYMPHOCYTES NFR BLD AUTO: 36 % (ref 21–51)
MCH RBC QN AUTO: 30.1 PG (ref 26–34)
MCHC RBC AUTO-ENTMCNC: 33.5 G/DL (ref 31–37)
MCV RBC AUTO: 90 FL (ref 81–99)
MONOCYTES # BLD AUTO: 0.4 THOUSAND/ΜL (ref 0.17–1.22)
MONOCYTES NFR BLD AUTO: 9 % (ref 2–12)
NEUTROPHILS # BLD AUTO: 2.5 THOUSANDS/ΜL (ref 1.4–6.5)
NEUTS SEG NFR BLD AUTO: 50 % (ref 42–75)
NONHDLC SERPL-MCNC: 96 MG/DL
PLATELET # BLD AUTO: 215 THOUSANDS/UL (ref 149–390)
PMV BLD AUTO: 8.9 FL (ref 8.6–11.7)
POTASSIUM SERPL-SCNC: 3.9 MMOL/L (ref 3.5–5.5)
RBC # BLD AUTO: 4.6 MILLION/UL (ref 3.9–5.2)
SODIUM SERPL-SCNC: 139 MMOL/L (ref 134–143)
TRIGL SERPL-MCNC: 48 MG/DL (ref 44–166)
WBC # BLD AUTO: 4.9 THOUSAND/UL (ref 4.8–10.8)

## 2021-04-24 PROCEDURE — 80048 BASIC METABOLIC PNL TOTAL CA: CPT

## 2021-04-24 PROCEDURE — 36415 COLL VENOUS BLD VENIPUNCTURE: CPT

## 2021-04-24 PROCEDURE — 80061 LIPID PANEL: CPT

## 2021-04-24 PROCEDURE — 83036 HEMOGLOBIN GLYCOSYLATED A1C: CPT

## 2021-04-24 PROCEDURE — 85025 COMPLETE CBC W/AUTO DIFF WBC: CPT

## 2021-04-26 ENCOUNTER — TELEPHONE (OUTPATIENT)
Dept: HEMATOLOGY ONCOLOGY | Facility: CLINIC | Age: 41
End: 2021-04-26

## 2021-04-26 NOTE — TELEPHONE ENCOUNTER
Call from patient's   Children tested positive for high levels of lead  Patient is requesting that DR Evelin Gonzalez order a test for lead  Patient does not have PCP

## 2021-04-26 NOTE — TELEPHONE ENCOUNTER
Patient's  calling requesting Dr Renu Anderson order a lead test as patient's children tested with high levels of lead  Instructed tai to have PCP order  Patient does not have PCP  Instructed patient to select one and have it ordered by PCP   would like his request sent to Dr Renu Anderson  Will send request to his team   aware of plan

## 2021-04-26 NOTE — TELEPHONE ENCOUNTER
Patient aware that Dr Evelin Gonzalez would like patient to have lead testing done by PCP or urgent care  Patient verbalizes understanding

## 2021-04-26 NOTE — TELEPHONE ENCOUNTER
Dr Georgie Mora will not order this test  She needs to have a PCP order this or go to urgent care for testing

## 2021-04-27 ENCOUNTER — HOSPITAL ENCOUNTER (OUTPATIENT)
Facility: AMBULARY SURGERY CENTER | Age: 41
Setting detail: OUTPATIENT SURGERY
Discharge: HOME | End: 2021-04-27
Attending: SURGERY | Admitting: SURGERY
Payer: COMMERCIAL

## 2021-04-27 ENCOUNTER — ANESTHESIA (OUTPATIENT)
Dept: PERIOP | Facility: AMBULARY SURGERY CENTER | Age: 41
End: 2021-04-27
Payer: COMMERCIAL

## 2021-04-27 VITALS
WEIGHT: 180 LBS | BODY MASS INDEX: 25.2 KG/M2 | HEART RATE: 72 BPM | OXYGEN SATURATION: 98 % | DIASTOLIC BLOOD PRESSURE: 74 MMHG | RESPIRATION RATE: 16 BRPM | SYSTOLIC BLOOD PRESSURE: 130 MMHG | TEMPERATURE: 98 F | HEIGHT: 71 IN

## 2021-04-27 DIAGNOSIS — H02.30 EXCESS SKIN OF EYELID, UNSPECIFIED LATERALITY: ICD-10-CM

## 2021-04-27 PROCEDURE — NC001 PR NO CHARGE: Performed by: SURGERY

## 2021-04-27 PROCEDURE — 11400 EXC TR-EXT B9+MARG 0.5 CM<: CPT | Performed by: SURGERY

## 2021-04-27 PROCEDURE — 67900 REPAIR BROW DEFECT: CPT | Performed by: PHYSICIAN ASSISTANT

## 2021-04-27 PROCEDURE — 67900 REPAIR BROW DEFECT: CPT | Performed by: SURGERY

## 2021-04-27 PROCEDURE — 88305 TISSUE EXAM BY PATHOLOGIST: CPT | Performed by: PATHOLOGY

## 2021-04-27 PROCEDURE — 11400 EXC TR-EXT B9+MARG 0.5 CM<: CPT | Performed by: PHYSICIAN ASSISTANT

## 2021-04-27 PROCEDURE — 15823 BLEPHARP UPR EYELID XCSV SKN: CPT | Performed by: PHYSICIAN ASSISTANT

## 2021-04-27 PROCEDURE — 15823 BLEPHARP UPR EYELID XCSV SKN: CPT | Performed by: SURGERY

## 2021-04-27 RX ORDER — ONDANSETRON 2 MG/ML
INJECTION INTRAMUSCULAR; INTRAVENOUS AS NEEDED
Status: DISCONTINUED | OUTPATIENT
Start: 2021-04-27 | End: 2021-04-27

## 2021-04-27 RX ORDER — PROMETHAZINE HYDROCHLORIDE 25 MG/ML
25 INJECTION, SOLUTION INTRAMUSCULAR; INTRAVENOUS ONCE AS NEEDED
Status: DISCONTINUED | OUTPATIENT
Start: 2021-04-27 | End: 2021-04-27 | Stop reason: HOSPADM

## 2021-04-27 RX ORDER — SODIUM CHLORIDE, SODIUM LACTATE, POTASSIUM CHLORIDE, CALCIUM CHLORIDE 600; 310; 30; 20 MG/100ML; MG/100ML; MG/100ML; MG/100ML
125 INJECTION, SOLUTION INTRAVENOUS CONTINUOUS
Status: DISCONTINUED | OUTPATIENT
Start: 2021-04-27 | End: 2021-04-27 | Stop reason: HOSPADM

## 2021-04-27 RX ORDER — MAGNESIUM HYDROXIDE 1200 MG/15ML
LIQUID ORAL AS NEEDED
Status: DISCONTINUED | OUTPATIENT
Start: 2021-04-27 | End: 2021-04-27 | Stop reason: HOSPADM

## 2021-04-27 RX ORDER — SODIUM CHLORIDE, SODIUM LACTATE, POTASSIUM CHLORIDE, CALCIUM CHLORIDE 600; 310; 30; 20 MG/100ML; MG/100ML; MG/100ML; MG/100ML
20 INJECTION, SOLUTION INTRAVENOUS CONTINUOUS
Status: DISCONTINUED | OUTPATIENT
Start: 2021-04-27 | End: 2021-04-27 | Stop reason: HOSPADM

## 2021-04-27 RX ORDER — HYDROMORPHONE HCL/PF 1 MG/ML
0.4 SYRINGE (ML) INJECTION
Status: DISCONTINUED | OUTPATIENT
Start: 2021-04-27 | End: 2021-04-27 | Stop reason: HOSPADM

## 2021-04-27 RX ORDER — LIDOCAINE HYDROCHLORIDE 10 MG/ML
INJECTION, SOLUTION EPIDURAL; INFILTRATION; INTRACAUDAL; PERINEURAL AS NEEDED
Status: DISCONTINUED | OUTPATIENT
Start: 2021-04-27 | End: 2021-04-27

## 2021-04-27 RX ORDER — BACITRACIN 500 [USP'U]/G
OINTMENT OPHTHALMIC AS NEEDED
Status: DISCONTINUED | OUTPATIENT
Start: 2021-04-27 | End: 2021-04-27 | Stop reason: HOSPADM

## 2021-04-27 RX ORDER — CEFAZOLIN SODIUM 2 G/50ML
2000 SOLUTION INTRAVENOUS ONCE
Status: DISCONTINUED | OUTPATIENT
Start: 2021-04-27 | End: 2021-04-27 | Stop reason: HOSPADM

## 2021-04-27 RX ORDER — PROPOFOL 10 MG/ML
INJECTION, EMULSION INTRAVENOUS AS NEEDED
Status: DISCONTINUED | OUTPATIENT
Start: 2021-04-27 | End: 2021-04-27

## 2021-04-27 RX ORDER — LIDOCAINE HYDROCHLORIDE AND EPINEPHRINE 10; 10 MG/ML; UG/ML
INJECTION, SOLUTION INFILTRATION; PERINEURAL AS NEEDED
Status: DISCONTINUED | OUTPATIENT
Start: 2021-04-27 | End: 2021-04-27 | Stop reason: HOSPADM

## 2021-04-27 RX ORDER — ACETAMINOPHEN AND CODEINE PHOSPHATE 300; 30 MG/1; MG/1
1 TABLET ORAL EVERY 4 HOURS PRN
Qty: 10 TABLET | Refills: 0 | Status: SHIPPED | OUTPATIENT
Start: 2021-04-27

## 2021-04-27 RX ORDER — TRAMADOL HYDROCHLORIDE 50 MG/1
50 TABLET ORAL EVERY 6 HOURS PRN
Status: DISCONTINUED | OUTPATIENT
Start: 2021-04-27 | End: 2021-04-27 | Stop reason: HOSPADM

## 2021-04-27 RX ORDER — ALBUTEROL SULFATE 2.5 MG/3ML
2.5 SOLUTION RESPIRATORY (INHALATION) ONCE AS NEEDED
Status: DISCONTINUED | OUTPATIENT
Start: 2021-04-27 | End: 2021-04-27 | Stop reason: HOSPADM

## 2021-04-27 RX ORDER — ONDANSETRON 2 MG/ML
4 INJECTION INTRAMUSCULAR; INTRAVENOUS ONCE AS NEEDED
Status: COMPLETED | OUTPATIENT
Start: 2021-04-27 | End: 2021-04-27

## 2021-04-27 RX ORDER — FENTANYL CITRATE 50 UG/ML
INJECTION, SOLUTION INTRAMUSCULAR; INTRAVENOUS AS NEEDED
Status: DISCONTINUED | OUTPATIENT
Start: 2021-04-27 | End: 2021-04-27

## 2021-04-27 RX ORDER — GLYCOPYRROLATE 0.2 MG/ML
INJECTION INTRAMUSCULAR; INTRAVENOUS AS NEEDED
Status: DISCONTINUED | OUTPATIENT
Start: 2021-04-27 | End: 2021-04-27

## 2021-04-27 RX ORDER — MIDAZOLAM HYDROCHLORIDE 2 MG/2ML
INJECTION, SOLUTION INTRAMUSCULAR; INTRAVENOUS AS NEEDED
Status: DISCONTINUED | OUTPATIENT
Start: 2021-04-27 | End: 2021-04-27

## 2021-04-27 RX ORDER — EPHEDRINE SULFATE 50 MG/ML
INJECTION INTRAVENOUS AS NEEDED
Status: DISCONTINUED | OUTPATIENT
Start: 2021-04-27 | End: 2021-04-27

## 2021-04-27 RX ORDER — CEFAZOLIN SODIUM 2 G/50ML
SOLUTION INTRAVENOUS AS NEEDED
Status: DISCONTINUED | OUTPATIENT
Start: 2021-04-27 | End: 2021-04-27

## 2021-04-27 RX ORDER — FENTANYL CITRATE/PF 50 MCG/ML
25 SYRINGE (ML) INJECTION
Status: COMPLETED | OUTPATIENT
Start: 2021-04-27 | End: 2021-04-27

## 2021-04-27 RX ADMIN — EPHEDRINE SULFATE 5 MG: 50 INJECTION, SOLUTION INTRAVENOUS at 13:12

## 2021-04-27 RX ADMIN — EPHEDRINE SULFATE 5 MG: 50 INJECTION, SOLUTION INTRAVENOUS at 12:38

## 2021-04-27 RX ADMIN — SODIUM CHLORIDE, SODIUM LACTATE, POTASSIUM CHLORIDE, AND CALCIUM CHLORIDE: .6; .31; .03; .02 INJECTION, SOLUTION INTRAVENOUS at 12:10

## 2021-04-27 RX ADMIN — ONDANSETRON 4 MG: 2 INJECTION INTRAMUSCULAR; INTRAVENOUS at 16:01

## 2021-04-27 RX ADMIN — FENTANYL CITRATE 25 MCG: 50 INJECTION INTRAMUSCULAR; INTRAVENOUS at 15:40

## 2021-04-27 RX ADMIN — EPHEDRINE SULFATE 5 MG: 50 INJECTION, SOLUTION INTRAVENOUS at 12:54

## 2021-04-27 RX ADMIN — FENTANYL CITRATE 50 MCG: 50 INJECTION, SOLUTION INTRAMUSCULAR; INTRAVENOUS at 12:12

## 2021-04-27 RX ADMIN — CEFAZOLIN SODIUM 2000 MG: 2 SOLUTION INTRAVENOUS at 12:10

## 2021-04-27 RX ADMIN — PROPOFOL 40 MG: 10 INJECTION, EMULSION INTRAVENOUS at 13:59

## 2021-04-27 RX ADMIN — ONDANSETRON 4 MG: 2 INJECTION INTRAMUSCULAR; INTRAVENOUS at 14:40

## 2021-04-27 RX ADMIN — FENTANYL CITRATE 25 MCG: 50 INJECTION INTRAMUSCULAR; INTRAVENOUS at 15:30

## 2021-04-27 RX ADMIN — FENTANYL CITRATE 25 MCG: 50 INJECTION, SOLUTION INTRAMUSCULAR; INTRAVENOUS at 14:40

## 2021-04-27 RX ADMIN — LIDOCAINE HYDROCHLORIDE 50 MG: 10 INJECTION, SOLUTION EPIDURAL; INFILTRATION; INTRACAUDAL at 12:12

## 2021-04-27 RX ADMIN — TRAMADOL HYDROCHLORIDE 50 MG: 50 TABLET, FILM COATED ORAL at 16:41

## 2021-04-27 RX ADMIN — EPHEDRINE SULFATE 5 MG: 50 INJECTION, SOLUTION INTRAVENOUS at 13:50

## 2021-04-27 RX ADMIN — FENTANYL CITRATE 25 MCG: 50 INJECTION, SOLUTION INTRAMUSCULAR; INTRAVENOUS at 13:59

## 2021-04-27 RX ADMIN — PROPOFOL 200 MG: 10 INJECTION, EMULSION INTRAVENOUS at 12:13

## 2021-04-27 RX ADMIN — EPHEDRINE SULFATE 5 MG: 50 INJECTION, SOLUTION INTRAVENOUS at 13:30

## 2021-04-27 RX ADMIN — EPHEDRINE SULFATE 5 MG: 50 INJECTION, SOLUTION INTRAVENOUS at 12:19

## 2021-04-27 RX ADMIN — GLYCOPYRROLATE 0.2 MG: 0.2 INJECTION, SOLUTION INTRAMUSCULAR; INTRAVENOUS at 12:23

## 2021-04-27 RX ADMIN — FENTANYL CITRATE 25 MCG: 50 INJECTION INTRAMUSCULAR; INTRAVENOUS at 15:13

## 2021-04-27 RX ADMIN — FENTANYL CITRATE 25 MCG: 50 INJECTION INTRAMUSCULAR; INTRAVENOUS at 15:23

## 2021-04-27 RX ADMIN — MIDAZOLAM HYDROCHLORIDE 2 MG: 1 INJECTION, SOLUTION INTRAMUSCULAR; INTRAVENOUS at 12:10

## 2021-04-27 NOTE — ANESTHESIA POSTPROCEDURE EVALUATION
Post-Op Assessment Note    CV Status:  Stable  Pain Score: 0    Pain management: adequate     Mental Status:  Alert and awake   Hydration Status:  Euvolemic   PONV Controlled:  Controlled   Airway Patency:  Patent      Post Op Vitals Reviewed: Yes      Staff: CRNA         No complications documented      BP   122/68   Temp   97 4   Pulse  76   Resp   18   SpO2   98

## 2021-04-27 NOTE — H&P
Office Visit    3/24/2021  [facility] Plastic [facility] Reconstruct[facility] Surgery Facundo Crespo MD  Plastic Surgery  Excess skin of eyelid, unspecified laterality  Dx  Follow-up  Reason for Visit   Progress Notes       Assessment/Plan:  Please see HPI  We discussed upper eyelid blepharoplasty, and lateral/temporal brow lift in detail, where the incisions/scars will be located, as well as potential risks, complications, and limitations including, but not limited to infection, bleeding, scarring, bruising, eyelid malposition, brow weakness, injury to the frontal branch of the facial nerve, the benefit of Botox following the procedures, etcetera etcetera  She would also like to have a skin lesion the lateral chest excised at the same time  She has also inquired about lower eyelid blepharoplasty, after lengthy discussion we have mutually agreed to perform the upper eyelid blepharoplasty and brow lift initially, she will consider lower eyelid blepharoplasty at some point in the future  The same holds for potential christina plasty/rhinoplasty  Their questions were answered to their satisfaction and consent was obtained             There are no diagnoses linked to this encounter        Subjective:  Excess skin upper eyelids, visual field cut, skin lesion/acrochordon right lateral chest      Patient ID: Danny Hill is a 35 y o  female      HPI she has been approved for bilateral upper eyelid blepharoplasty and lateral/temporal brow lift, she presents for a preoperative visit     The following portions of the patient's history were reviewed and updated as appropriate: allergies, current medications, past family history, past medical history, past social history, past surgical history and problem list      Review of Systems   Constitutional: Negative for chills and fever  HENT: Negative for hearing loss  Eyes: Positive for visual disturbance  Negative for discharge     Respiratory: Negative for chest tightness and shortness of breath  Cardiovascular: Negative for chest pain and leg swelling  Gastrointestinal: Negative for blood in stool, constipation, diarrhea and nausea  Genitourinary: Negative for dysuria  Musculoskeletal: Negative for gait problem  Skin: Negative for rash  Allergic/Immunologic: Negative for immunocompromised state  Neurological: Negative for seizures and headaches  Hematological: Does not bruise/bleed easily  Psychiatric/Behavioral: Positive for dysphoric mood  The patient is nervous/anxious            Objective:        /77   Pulse 64   Temp (!) 97 3 °F (36 3 °C)   Ht 5' 11" (1 803 m)   Wt 85 5 kg (188 lb 8 oz)   BMI 26 29 kg/m²             Physical Exam  Constitutional:       Appearance: Normal appearance  HENT:      Head: Normocephalic and atraumatic  Eyes:      Extraocular Movements: Extraocular movements intact  Pupils: Pupils are equal, round, and reactive to light  Comments: Excess skin upper eyelids, prominent medial fat compartments, prominent orbicularis lower lids, prominent fat compartments with hyperpigmentation, see photos from previous visit   Neck:      Musculoskeletal: Normal range of motion and neck supple  Cardiovascular:      Rate and Rhythm: Normal rate  Pulmonary:      Effort: Pulmonary effort is normal    Abdominal:      Palpations: Abdomen is soft  Musculoskeletal: Normal range of motion  Skin:     General: Skin is warm  Neurological:      Mental Status: She is alert and oriented to person, place, and time     Psychiatric:         Mood and Affect: Mood normal               Instructions     After Visit Summary (Automatic SnapShot taken 3/24/2021)  Additional Documentation    Vitals:    /77   Pulse 64   Temp 97 3 °F (36 3 °C)    Ht 5' 11" (1 803 m)   Wt 85 5 kg (188 lb 8 oz)   BMI 26 29 kg/m²   BSA 2 06 m²      More Vitals   Encounter Info:    Billing Info,   History,   Allergies,   Detailed Report      Orders Placed     None  Medication Changes                                                 Medication List   Visit Diagnoses       Excess skin of eyelid, unspecified laterality     Problem List

## 2021-04-27 NOTE — INTERVAL H&P NOTE
H&P reviewed  After examining the patient I find no changes in the patients condition since the H&P had been written      Vitals:    04/27/21 1158   BP: 113/59   Pulse: 67   Resp: 18   Temp: 97 6 °F (36 4 °C)   SpO2: 97%

## 2021-04-27 NOTE — ANESTHESIA PREPROCEDURE EVALUATION
Procedure:  UPPER BLEPHAROPLASTY WITH LATERAL TEMPORAL BROWLIFT (Bilateral Eye)  CHEST LESION EXCISION (Right Chest)    Relevant Problems   GYN   (+) Malignant neoplasm of upper-outer quadrant of left breast in female, estrogen receptor positive ([facility] [address] )      NEURO/PSYCH   (+) Previous  labor affecting pregnancy, antepartum      PULMONARY   (+) Obstructive sleep apnea      Other   (+) Sleep-disordered breathing      Recent labs personally reviewed:  Lab Results   Component Value Date    WBC 4 90 2021    HGB 13 9 2021     2021     Lab Results   Component Value Date    K 3 9 2021    BUN 19 2021    CREATININE 0 90 2021     Lab Results   Component Value Date    PTT 29 2019      Lab Results   Component Value Date    INR 1 07 2019       Blood type B    Lab Results   Component Value Date    HGBA1C 5 0 2021       Physical Exam    Airway       Dental       Cardiovascular  Rhythm: regular, Rate: normal,     Pulmonary  Breath sounds clear to auscultation,     Other Findings        Anesthesia Plan  ASA Score- 2     Anesthesia Type- general with ASA Monitors  Additional Monitors:   Airway Plan: LMA  Plan Factors-Exercise tolerance (METS): >4 METS  Chart reviewed  EKG reviewed  Existing labs reviewed  Patient summary reviewed  Induction- intravenous  Postoperative Plan-   Planned trial extubation    Informed Consent- Anesthetic plan and risks discussed with patient  I personally reviewed this patient with the CRNA  Discussed and agreed on the Anesthesia Plan with the CRNA  Jm Rojas

## 2021-04-27 NOTE — INTERIM OP NOTE
UPPER BLEPHAROPLASTY WITH LATERAL TEMPORAL BROWLIFT, CHEST LESION EXCISION  Postoperative Note  PATIENT NAME: Otoniel Gamboa  : 1980  MRN: [id number]  [facility] OR ROOM 04    Surgery Date: 2021    Preop Diagnosis:  Excess skin of eyelid, unspecified laterality [H02 30]    Post-Op Diagnosis Codes:     * Excess skin of eyelid, unspecified laterality [H02 30]    Procedure(s) (LRB):  UPPER BLEPHAROPLASTY WITH LATERAL TEMPORAL BROWLIFT (Bilateral)  CHEST LESION EXCISION (Right)    Surgeon(s) and Role:     * Callie Hernandez MD - Primary     * Robert Rainey PA-C - Assisting    Specimens:  ID Type Source Tests Collected by Time Destination   1 : Lesion Right Lateral Chast Tissue Lesion TISSUE Crystal Shannon MD 2021 1429        Estimated Blood Loss:   Minimal    Anesthesia Type:   General     Findings:    None  Complications:   None    SIGNATURE: Robert Rainey PA-C   DATE: 2021   TIME: 2:40 PM

## 2021-04-27 NOTE — OP NOTE
OPERATIVE REPORT  PATIENT NAME: Alma David    :  1980  MRN: [id number]  Pt Location: [facility] OR ROOM 04    SURGERY DATE: 2021    Surgeon(s) and Role:     Avis Flores MD - Primary     * Shital Almazan PA-C - Assisting    Preop Diagnosis:  Excess skin of eyelid, unspecified laterality [H02 30] lateral brow ptosis, right lateral chest lesion    Post-Op Diagnosis Codes:  As above    Procedure:  1  Bilateral eyelid blepharoplasty, with removal excess skin and prominent fat compartments 2  Bilateral lateral/temporal brow left 3  Excision right chest lesion 4 mm   Specimen(s):  ID Type Source Tests Collected by Time Destination   1 : Lesion Right Lateral Chast Tissue Lesion TISSUE Kathie Hernandez MD 2021 6312        Estimated Blood Loss:   Minimal    Drains:  * No LDAs found *    Anesthesia Type:   General    Operative Indications:  Excess skin of eyelid, unspecified laterality [H02 30]  Prominent fat compartments upper eyelids, lateral brow ptosis, lesion right chest    Operative Findings:  As above    Complications:   None    Procedure and Technique:  Saige Berger was seen preoperatively in the holding area, the surgical sites were marked with her participation  I reviewed the planned procedure, potential risks, complications, and limitations  She was taken to the op balance erating room and underwent induction general anesthesia by the anesthesia personnel  The operative field was prepped and draped in sterile fashion a proper time-out was performed  2 5 loupe magnification was utilized throughout procedure to aid in visualization  The brows were addressed initially, the right side was addressed 1st, local anesthesia was administered in usual fashion for adequate, the wound was irrigated    The skin incisions created with a 15 blade, this carried down through the dermis, Bovie cautery was used to dissect through the subcutaneous tissue down to the superficial layer of the deep temporal fascia  Dissection then proceeded in the avascular plane superficial to the superficial layer of the deep temporal fascia utilizing a [place] elevator  This was taken down to the temporal crest at which time the dissection then proceeded in the subperiosteal plane  The brow was released along the ligamentous adhesions utilizing the [facility]  The wound was irrigated hemostasis was adequate  The brow was then fixed into the desired position utilizing 3-0 PDS sutures x3  The brow was positioned into the desired location, extent position while the sutures were secured  Following this, the excess skin at the incision site was excised utilizing curved iris scissors  The wound was thoroughly irrigated and hemostasis assured  Closure was accomplished with 4-0 PDS buried at the level of dermis, this was followed by 4-0 Prolene skin sutures  An identical procedure was performed on the left brow  After completion of the bilateral temporal brow lift, the upper eyelid blepharoplasties were performed  Both eyes were marked in the usual fashion to perform upper eyelid blepharoplasty, the inferior line of resection was marked in a natural crease, 10 mm superior to the lash margin at the midpupillary line  The amount of skin to be safely resected was then assessed utilizing a pinch technique with Adson forceps  After the markings were made bilaterally, corneal [place]rente were placed and local anesthesia was administered  The right side was addressed initially, the skin incisions created with a 15 blade, the skin was then removed utilizing a 15 blade  The cap of orbicularis was excised in the medial portion of the operative field to expose the nasal and middle fat compartments    Utilizing gentle pressure on the globe, the amount of fat to be resected was assessed with gentle pressure, that which were was easily distracted from the site was clamped with a mosquito, excised and cauterized with the bipolar cautery  After adequate excision/resection had been completed the wound was irrigated and hemostasis assured with the bipolar cautery  An identical procedure was performed on the opposite, left lid and once hemostasis was adequate the wounds were again irrigated  The septum was repaired with interrupted 6 0 Vicryl sutures, this was followed by skin closure with interrupted 6 0 nylon sutures  Attention was then turned to the chest lesion, the area was infiltrated with lidocaine with epinephrine  Lesion was excised utilizing curved iris scissors, and closed with 6 0 nylon skin suture  A Band-Aid was applied to the site  The corneal [place] were then removed from both thighs, both eyes were copiously irrigated with balanced salt solution  Ophthalmic antibiotic solution was placed on the eyelid incisions and bacitracin ointment was applied to the scalp incisions  The patient was transferred to the recovery room  I was present for the entire procedure  No qualified resident was available  The physician assistant provided essential assistance with patient positioning, retraction, hemostasis, wound closure      Patient Disposition:      SIGNATURE: Luz Byrd MD  DATE: April 27, 2021  TIME: 2:56 PM

## 2021-04-27 NOTE — DISCHARGE INSTRUCTIONS
[facility]  Dr Peggy Perry   [address] [facility] [address], [address]  Phone: [phone]     Postoperative Instructions for Outpatient Surgery     These instructions are being provided by your doctor to give you basic guidelines during your post-op recovery  Please let our office know if your contact information has changed       Please call the office today for an appointment in 5-7 days for suture removal      Dressings:  Ophthalmic bacitracin to upper eyelids 3 times daily  Bacitracin to temple incisions 3 times daily for 3 days      Activity Restrictions:  Nothing strenuous      Bathing:  May shower tomorrow evening  Pat incisions dry afterwards      Medications:    Resume pre-op medications  You may take tylenol, aleve, or ibuprofen for pain control             Tylenol #3 as needed for pain  Other:  May use moisturizing eyedrops as needed for dry eye  Ice to all incision areas at 10 minute intervals over the next 72 hours while awake  Elevate head of bed at night to sleep over the next 72 hours

## 2021-04-29 ENCOUNTER — TELEPHONE (OUTPATIENT)
Dept: PLASTIC SURGERY | Facility: CLINIC | Age: 41
End: 2021-04-29

## 2021-04-29 NOTE — TELEPHONE ENCOUNTER
Patient called for a follow up   She had surgery for Bleph on 4/27/2021 and needs suture removal  I offered patient May 5 and she stated she can not come that day    I told her I have nothing the rest of the week    she hung up on me and said she will arrange another way

## 2021-04-29 NOTE — TELEPHONE ENCOUNTER
OF PATIENT CALLED TO MAKE POST OP FOR PATIENT    I OFFERED HIM SEVERAL TIMES AND HE HAS TO CHECK SCHEDULE AND CALL BACK

## 2021-05-05 ENCOUNTER — OFFICE VISIT (OUTPATIENT)
Dept: PLASTIC SURGERY | Facility: HOSPITAL | Age: 41
End: 2021-05-05

## 2021-05-05 DIAGNOSIS — Z98.890 STATUS POST BLEPHAROPLASTY OF BOTH EYES: Primary | ICD-10-CM

## 2021-05-05 PROCEDURE — RECHECK: Performed by: PHYSICIAN ASSISTANT

## 2021-05-05 NOTE — INTERIM OP NOTE
UPPER BLEPHAROPLASTY WITH LATERAL TEMPORAL BROWLIFT, CHEST LESION EXCISION  Postoperative Note  PATIENT NAME: Maria Luisa Rodriguez  : 1980  MRN: [id number]  [facility] OR ROOM 04     Surgery Date: 2021     Preop Diagnosis:  Excess skin of eyelid, unspecified laterality [H02 30]     Post-Op Diagnosis Codes:     * Excess skin of eyelid, unspecified laterality [H02 30]     Procedure(s) (LRB):  UPPER BLEPHAROPLASTY WITH LATERAL TEMPORAL BROWLIFT (Bilateral)  CHEST LESION EXCISION (Right)     Surgeon(s) and Role:     * Sheyla Melton MD - Primary     * Orlando Carney PA-C - Assisting     Specimens:  ID Type Source Tests Collected by Time Destination   1 : Lesion Right Lateral Chast Tissue Lesion TISSUE Humberto Herron MD 2021 1429           Estimated Blood Loss:   Minimal     Anesthesia Type:   General      Findings:    None  Complications:   None

## 2021-05-05 NOTE — PROGRESS NOTES
Assessment/Plan:   Holly Saul is a 35year old female who is 1 week status post bilateral brow lift and blepharoplasty  Please see HPI  Seen with Dr Anju Prater as well  Sutures removed from upper lids  Follow up in 1 week for suture removal from the temples  Diagnoses and all orders for this visit:    Status post blepharoplasty of both eyes          Subjective:     Patient ID: Pérez Arnold is a 35 y o  female  HPI   She reports some soreness of the right forehead  Otherwise, she is well  Review of Systems   Skin:        As per HPI  Objective:     Physical Exam  Skin:     Comments: All incisions are clean, dry and intact  Sutures removed from the upper lids  Mild ecchymosis noted

## 2021-05-12 ENCOUNTER — OFFICE VISIT (OUTPATIENT)
Dept: PLASTIC SURGERY | Facility: HOSPITAL | Age: 41
End: 2021-05-12

## 2021-05-12 DIAGNOSIS — Z98.890 STATUS POST BLEPHAROPLASTY OF BOTH EYES: Primary | ICD-10-CM

## 2021-05-12 PROCEDURE — RECHECK: Performed by: PHYSICIAN ASSISTANT

## 2021-05-12 NOTE — PROGRESS NOTES
Assessment/Plan:   Kyleigh Brewer is a 35year old female who is 1 week status post bilateral brow lift and blepharoplasty  Please see HPI  Seen with Dr Donna Duvall as well  Her temporal sutures were removed today  She was given information on how to use silicone scar gel  She was assured that she should continue to improve with more time  We will see her back in 3-4 weeks  Diagnoses and all orders for this visit:    Status post blepharoplasty of both eyes          Subjective:     Patient ID: Claudia Gomez is a 35 y o  female  HPI   She reports continued tenderness of the right temple area  She also notes a lump on the right eyelid  Review of Systems   Skin:        As per HPI  Objective:     Physical Exam  Skin:     Comments: All incisions are clean, dry and intact  Temporal sutures were removed  Please see photos

## 2021-05-17 ENCOUNTER — IMMUNIZATIONS (OUTPATIENT)
Dept: FAMILY MEDICINE CLINIC | Facility: HOSPITAL | Age: 41
End: 2021-05-17

## 2021-05-17 DIAGNOSIS — Z23 ENCOUNTER FOR IMMUNIZATION: Primary | ICD-10-CM

## 2021-05-17 PROCEDURE — 91300 SARS-COV-2 / COVID-19 MRNA VACCINE (PFIZER-BIONTECH) 30 MCG: CPT

## 2021-05-17 PROCEDURE — 0001A SARS-COV-2 / COVID-19 MRNA VACCINE (PFIZER-BIONTECH) 30 MCG: CPT

## 2021-06-15 ENCOUNTER — IMMUNIZATIONS (OUTPATIENT)
Dept: FAMILY MEDICINE CLINIC | Facility: HOSPITAL | Age: 41
End: 2021-06-15

## 2021-06-15 DIAGNOSIS — Z23 ENCOUNTER FOR IMMUNIZATION: Primary | ICD-10-CM

## 2021-06-15 PROCEDURE — 0002A SARS-COV-2 / COVID-19 MRNA VACCINE (PFIZER-BIONTECH) 30 MCG: CPT

## 2021-06-15 PROCEDURE — 91300 SARS-COV-2 / COVID-19 MRNA VACCINE (PFIZER-BIONTECH) 30 MCG: CPT

## 2021-06-25 ENCOUNTER — OFFICE VISIT (OUTPATIENT)
Dept: PLASTIC SURGERY | Facility: CLINIC | Age: 41
End: 2021-06-25

## 2021-06-25 DIAGNOSIS — Z98.890 STATUS POST BLEPHAROPLASTY OF BOTH EYES: Primary | ICD-10-CM

## 2021-06-25 PROCEDURE — RECHECK: Performed by: PHYSICIAN ASSISTANT

## 2021-06-25 NOTE — PROGRESS NOTES
Assessment/Plan:   Ty Murrieta is a 35year old female who is 2 months status post bilateral brow lift and blepharoplasty  Please see HPI  Seen with Dr Jean Pierre Goldstein as well  She was assured that scars can take 6-12 months time to heal  She will also avoid excessive sun exposure  She will continue to massage the scars and we will see her back in 2 months  Diagnoses and all orders for this visit:    Status post blepharoplasty of both eyes          Subjective:     Patient ID: Kwabena Fontenot is a 35 y o  female  HPI   She is concerned over the scars on her upper eye lids  They are itchy and painful  She is happy with the appearance of her lids overall  Review of Systems   Skin:        As per HPI  Objective:     Physical Exam  Skin:     Comments: Scars are healing well over the temporal and upper lids  Please see photos

## 2021-09-28 ENCOUNTER — TELEPHONE (OUTPATIENT)
Dept: HEMATOLOGY ONCOLOGY | Facility: CLINIC | Age: 41
End: 2021-09-28

## 2021-09-28 NOTE — TELEPHONE ENCOUNTER
Patient called in to change appt that was RS to November with Dr Jorge Ramírez back to original date   appt was moved back to 10/13/2021 @3pm per patient request

## 2021-11-16 PROBLEM — Z3A.39 39 WEEKS GESTATION OF PREGNANCY: Status: RESOLVED | Noted: 2019-06-12 | Resolved: 2021-11-16

## 2021-11-16 PROBLEM — O09.219 PREVIOUS PRETERM LABOR AFFECTING PREGNANCY, ANTEPARTUM: Status: RESOLVED | Noted: 2019-04-10 | Resolved: 2021-11-16

## 2021-11-16 PROBLEM — O09.529 AMA (ADVANCED MATERNAL AGE) MULTIGRAVIDA 35+: Status: RESOLVED | Noted: 2019-03-13 | Resolved: 2021-11-16

## 2021-11-17 ENCOUNTER — OFFICE VISIT (OUTPATIENT)
Dept: SURGICAL ONCOLOGY | Facility: CLINIC | Age: 41
End: 2021-11-17
Payer: COMMERCIAL

## 2021-11-17 ENCOUNTER — OFFICE VISIT (OUTPATIENT)
Dept: HEMATOLOGY ONCOLOGY | Facility: CLINIC | Age: 41
End: 2021-11-17
Payer: COMMERCIAL

## 2021-11-17 ENCOUNTER — PATIENT OUTREACH (OUTPATIENT)
Dept: CASE MANAGEMENT | Facility: OTHER | Age: 41
End: 2021-11-17

## 2021-11-17 VITALS
OXYGEN SATURATION: 98 % | DIASTOLIC BLOOD PRESSURE: 74 MMHG | HEIGHT: 71 IN | HEART RATE: 80 BPM | SYSTOLIC BLOOD PRESSURE: 120 MMHG | WEIGHT: 184 LBS | BODY MASS INDEX: 25.76 KG/M2 | RESPIRATION RATE: 16 BRPM | TEMPERATURE: 98 F

## 2021-11-17 VITALS
BODY MASS INDEX: 25.76 KG/M2 | HEART RATE: 80 BPM | SYSTOLIC BLOOD PRESSURE: 120 MMHG | TEMPERATURE: 98 F | HEIGHT: 71 IN | WEIGHT: 184 LBS | RESPIRATION RATE: 18 BRPM | OXYGEN SATURATION: 98 % | DIASTOLIC BLOOD PRESSURE: 74 MMHG

## 2021-11-17 DIAGNOSIS — Z17.0 MALIGNANT NEOPLASM OF UPPER-OUTER QUADRANT OF LEFT BREAST IN FEMALE, ESTROGEN RECEPTOR POSITIVE (HCC): Primary | ICD-10-CM

## 2021-11-17 DIAGNOSIS — C50.412 MALIGNANT NEOPLASM OF UPPER-OUTER QUADRANT OF LEFT BREAST IN FEMALE, ESTROGEN RECEPTOR POSITIVE (HCC): Primary | ICD-10-CM

## 2021-11-17 DIAGNOSIS — Z79.810 USE OF TAMOXIFEN (NOLVADEX): ICD-10-CM

## 2021-11-17 PROCEDURE — 99214 OFFICE O/P EST MOD 30 MIN: CPT | Performed by: INTERNAL MEDICINE

## 2021-11-17 PROCEDURE — 99214 OFFICE O/P EST MOD 30 MIN: CPT | Performed by: SURGERY

## 2021-11-18 ENCOUNTER — HOSPITAL ENCOUNTER (OUTPATIENT)
Dept: MAMMOGRAPHY | Facility: CLINIC | Age: 41
Discharge: HOME | End: 2021-11-18
Payer: COMMERCIAL

## 2021-11-18 ENCOUNTER — ANNUAL EXAM (OUTPATIENT)
Dept: OBGYN CLINIC | Facility: CLINIC | Age: 41
End: 2021-11-18
Payer: COMMERCIAL

## 2021-11-18 VITALS — BODY MASS INDEX: 25.76 KG/M2 | WEIGHT: 184 LBS | HEIGHT: 71 IN

## 2021-11-18 DIAGNOSIS — Z01.419 ENCOUNTER FOR ANNUAL ROUTINE GYNECOLOGICAL EXAMINATION: ICD-10-CM

## 2021-11-18 DIAGNOSIS — Z85.3 HISTORY OF BREAST CANCER: ICD-10-CM

## 2021-11-18 DIAGNOSIS — Z17.0 MALIGNANT NEOPLASM OF UPPER-OUTER QUADRANT OF LEFT BREAST IN FEMALE, ESTROGEN RECEPTOR POSITIVE (HCC): ICD-10-CM

## 2021-11-18 DIAGNOSIS — Z12.31 ENCOUNTER FOR SCREENING MAMMOGRAM FOR BREAST CANCER: Primary | ICD-10-CM

## 2021-11-18 DIAGNOSIS — C50.412 MALIGNANT NEOPLASM OF UPPER-OUTER QUADRANT OF LEFT BREAST IN FEMALE, ESTROGEN RECEPTOR POSITIVE (HCC): ICD-10-CM

## 2021-11-18 PROCEDURE — G0279 TOMOSYNTHESIS, MAMMO: HCPCS

## 2021-11-18 PROCEDURE — 99396 PREV VISIT EST AGE 40-64: CPT | Performed by: OBSTETRICS & GYNECOLOGY

## 2021-11-18 PROCEDURE — 77066 DX MAMMO INCL CAD BI: CPT

## 2021-11-24 ENCOUNTER — HOSPITAL ENCOUNTER (OUTPATIENT)
Dept: CT IMAGING | Facility: HOSPITAL | Age: 41
Discharge: HOME | End: 2021-11-24
Payer: COMMERCIAL

## 2021-11-24 DIAGNOSIS — C50.412 MALIGNANT NEOPLASM OF UPPER-OUTER QUADRANT OF LEFT BREAST IN FEMALE, ESTROGEN RECEPTOR POSITIVE (HCC): ICD-10-CM

## 2021-11-24 DIAGNOSIS — Z17.0 MALIGNANT NEOPLASM OF UPPER-OUTER QUADRANT OF LEFT BREAST IN FEMALE, ESTROGEN RECEPTOR POSITIVE (HCC): ICD-10-CM

## 2021-11-24 PROCEDURE — 74177 CT ABD & PELVIS W/CONTRAST: CPT

## 2021-11-24 RX ADMIN — IOHEXOL 100 ML: 350 INJECTION, SOLUTION INTRAVENOUS at 10:21

## 2021-11-30 ENCOUNTER — TELEPHONE (OUTPATIENT)
Dept: HEMATOLOGY ONCOLOGY | Facility: CLINIC | Age: 41
End: 2021-11-30

## 2022-03-11 ENCOUNTER — TELEPHONE (OUTPATIENT)
Dept: SURGICAL ONCOLOGY | Facility: CLINIC | Age: 42
End: 2022-03-11

## 2022-03-11 NOTE — TELEPHONE ENCOUNTER
Left Voicemail for patient to call back and r/s their appointment with Clement Soto at [facility]  If patient is a BREAST PATIENT:   -they can see Hardeep Hernandes at [facility] or Clement Soto at [facility]     If patient is a NONBREAST PATIENT:   -they can see Lina at [facility] or Hardeep Hernandes at [facility] on Fridays

## 2022-04-08 ENCOUNTER — TELEPHONE (OUTPATIENT)
Dept: OBGYN CLINIC | Facility: CLINIC | Age: 42
End: 2022-04-08

## 2022-04-28 ENCOUNTER — TELEPHONE (OUTPATIENT)
Dept: SURGICAL ONCOLOGY | Facility: CLINIC | Age: 42
End: 2022-04-28

## 2022-07-29 ENCOUNTER — TELEPHONE (OUTPATIENT)
Dept: HEMATOLOGY ONCOLOGY | Facility: CLINIC | Age: 42
End: 2022-07-29

## 2022-07-29 NOTE — TELEPHONE ENCOUNTER
Left voice message and sent message through [facility] kenia notifying of appointment change fro Dr Claude Junes   Gave HOPE # to call with any questions or reschedule

## 2022-09-28 DIAGNOSIS — C50.412 MALIGNANT NEOPLASM OF UPPER-OUTER QUADRANT OF LEFT BREAST IN FEMALE, ESTROGEN RECEPTOR POSITIVE (HCC): ICD-10-CM

## 2022-09-28 DIAGNOSIS — Z17.0 MALIGNANT NEOPLASM OF UPPER-OUTER QUADRANT OF LEFT BREAST IN FEMALE, ESTROGEN RECEPTOR POSITIVE (HCC): ICD-10-CM

## 2022-09-28 RX ORDER — TAMOXIFEN CITRATE 20 MG/1
TABLET ORAL
Qty: 90 TABLET | Refills: 3 | Status: SHIPPED | OUTPATIENT
Start: 2022-09-28

## 2022-09-28 RX ORDER — TAMOXIFEN CITRATE 20 MG/1
20 TABLET ORAL DAILY
Qty: 90 TABLET | Refills: 3 | OUTPATIENT
Start: 2022-09-28

## 2022-10-28 ENCOUNTER — OFFICE VISIT (OUTPATIENT)
Dept: SURGICAL ONCOLOGY | Facility: CLINIC | Age: 42
End: 2022-10-28

## 2022-10-28 ENCOUNTER — OFFICE VISIT (OUTPATIENT)
Dept: HEMATOLOGY ONCOLOGY | Facility: CLINIC | Age: 42
End: 2022-10-28

## 2022-10-28 VITALS
DIASTOLIC BLOOD PRESSURE: 60 MMHG | BODY MASS INDEX: 26.46 KG/M2 | SYSTOLIC BLOOD PRESSURE: 110 MMHG | TEMPERATURE: 97.5 F | WEIGHT: 189 LBS | OXYGEN SATURATION: 99 % | RESPIRATION RATE: 16 BRPM | HEART RATE: 72 BPM | HEIGHT: 71 IN

## 2022-10-28 VITALS
DIASTOLIC BLOOD PRESSURE: 60 MMHG | SYSTOLIC BLOOD PRESSURE: 110 MMHG | BODY MASS INDEX: 26.46 KG/M2 | OXYGEN SATURATION: 99 % | WEIGHT: 189 LBS | HEART RATE: 72 BPM | HEIGHT: 71 IN | RESPIRATION RATE: 16 BRPM | TEMPERATURE: 97.5 F

## 2022-10-28 DIAGNOSIS — Z79.810 USE OF TAMOXIFEN (NOLVADEX): ICD-10-CM

## 2022-10-28 DIAGNOSIS — Z17.0 MALIGNANT NEOPLASM OF UPPER-OUTER QUADRANT OF LEFT BREAST IN FEMALE, ESTROGEN RECEPTOR POSITIVE (HCC): Primary | ICD-10-CM

## 2022-10-28 DIAGNOSIS — C50.412 MALIGNANT NEOPLASM OF UPPER-OUTER QUADRANT OF LEFT BREAST IN FEMALE, ESTROGEN RECEPTOR POSITIVE (HCC): Primary | ICD-10-CM

## 2022-10-28 NOTE — PROGRESS NOTES
Hematology / Oncology Outpatient Follow Up Note    Seth Bruner 38 y o  female ZUI:[id number] [facility]:[id number]         Date:  10/28/2022    Assessment / Plan:  A 78-year-old premenopausal woman with stage IA left breast cancer, grade 2, ER 90% positive, CT 60% positive, HER2 negative disease   She is negative for BRCA gene mutation   She underwent lumpectomy and sentinel lymph node biopsy, resulting in LAVON   Her tumor was found to be high risk based on a MammaPrint and luminal B type   She completed adjuvant chemotherapy with Taxotere and cyclophosphamide as well as radiation therapy   She is currently on adjuvant hormonal therapy with tamoxifen with excellent tolerance  Clinically, she has no evidence recurrent disease  Since she has been on tamoxifen for more than 2 years, she may try being pregnant for 3rd child, using cryopreserved eggs  However, she has to discontinue tamoxifen at least 2-3 months before the pregnancy  We discussed the breast cancer outcome by interrupting the tamoxifen treatment as was as the consequences of pregnancy  We do not have definitive data regarding this  It is possible that she may have slight increased risk of breast cancer recurrence by holding tamoxifen during the pregnancy  She is going to see gynecologist and make final decision  In the meantime, she will continue tamoxifen  I will see her again in 6 months for routine follow-up         Subjective:      HPI:  A 78-year-old premenopausal woman who noticed left breast lump 5 months ago  Lucina Herman brought to medical attention   When she was found her lump, she was pregnant  Lucina Herman was found to have radiographic abnormality in her left breast which was biopsied in August 13, 2019 which showed invasive ductal carcinoma, grade 2  This was ER 90 % positive, CT 60% positive, HER2 negative disease   Subsequently, she delivered baby few weeks after her biopsy   She was seen by Dr Janett Herman was negative for BRCA gene mutation   Therefore, she underwent left lumpectomy in September 24, 2019 which showed 19 mm of invasive ductal carcinoma, grade 2  3 sentinel lymph nodes were all negative for metastatic disease   Dr Mery Ordaz sent her tumor tissue for MammaPrint which came back as high risk, luminal B type   She presents today with her  who is a emergency department physician at [facility] [employer] [facility] to discuss adjuvant treatment options   She has no past medical history   She feels well   She has no complaint of pain   Her weight is stable   She has no respiratory symptoms  Rashad Cruz is a lifetime never smoker  Rashad Cruz has no family history of breast or ovarian cancer   Her performance status is normal              Interval History:   A 57-year-old premenopausal woman with stage IA left breast cancer, grade 2, ER 90% positive, DE 60% positive, HER2 negative disease   She is negative for BRCA gene mutation   She underwent lumpectomy and sentinel lymph node biopsy, resulting in LAVON    Her tumor was found to be high risk, based on a MammaPrint   Therefore, she was treated with 4 cycle of adjuvant chemotherapy with Taxotere and cyclophosphamide which she completed in January 2020  Since March 2020, she has been on adjuvant hormonal therapy with tamoxifen  Rashad Cruz presents today for routine follow-up  In the last 6 months, she had 2 menstrual bleeding  She feels well  She is tolerating tamoxifen well  Denied any pain  She has no respiratory symptoms  She is considering being pregnant for her 3rd child  Her  present in conference call regarding this    She has cryopreserved eggs       Objective:      Primary Diagnosis:     1 Left breast cancer, stage IA (pT1c, pN0, M0) grade 2, ER 90% positive, DE 60% positive, HER2 negative disease   MammaPrint high risk   Diagnosed in September 2019      2  BRCA gene mutation negative      Cancer Staging:  Cancer Staging  Malignant neoplasm of upper-outer quadrant of left breast in female, estrogen receptor positive ([facility] [address] )  Staging form: Breast, AJCC 8th Edition  - Pathologic: Stage IA (pT1c, pN0(sn), cM0, G2, ER+, KY+, HER2-) - Unsigned  Neoadjuvant therapy: No  Laterality: Left  Tumor size (mm): 19  Method of lymph node assessment: Adel lymph node biopsy  Histologic grading system: 3 grade system           Previous Hematologic/ Oncologic Treatment:       Adjuvant chemotherapy with Taxotere and cyclophosphamide x4 cycle, completed in early January 2020       Current Hematologic/ Oncologic Treatment:       Adjuvant hormonal therapy with tamoxifen since March 2020       Disease Status:      LAVON status post lumpectomy and sentinel lymph node biopsy      Test Results:     Pathology:      1 9 x 1 8 x 1 8 cm of invasive ductal carcinoma, grade 2  3 sentinel lymph nodes were negative for metastatic disease   ER 90% positive, KY 60% positive, HER2 negative disease   MammaPrint high risk, luminal B type   Stage IA (pT1c, pN0, M0)       Radiology:     Mammography in November 2021  was benign   BI-RADS 2  CT scan of abdomen pelvis in November 2021 was negative      Laboratory:     See below      Physical Exam:        General Appearance:    Alert, oriented          Eyes:    PERRL   Ears:    Normal external ear canals, both ears   Nose:   Nares normal, septum midline   Throat:   Mucosa moist  Pharynx without injection  Neck:   Supple         Lungs:     Clear to auscultation bilaterally   Chest Wall:    No tenderness or deformity    Heart:    Regular rate and rhythm         Abdomen:     Soft, non-tender, bowel sounds +, no organomegaly               Extremities:   Extremities no cyanosis or edema         Skin:   no rash or icterus      Lymph nodes:   Cervical, supraclavicular, and axillary nodes normal   Neurologic:   CNII-XII intact, normal strength, sensation and reflexes     Throughout             Breast exam:   Lumpectomy scar at upper outer quadrant of her left breast with no palpable abnormalities   Right breast exam is negative  ROS: Review of Systems   All other systems reviewed and are negative  Imaging: No results found  Labs:   Lab Results   Component Value Date    WBC 4 90 04/24/2021    HGB 13 9 04/24/2021    HCT 41 4 (L) 04/24/2021    MCV 90 04/24/2021     04/24/2021     Lab Results   Component Value Date    K 3 9 04/24/2021     04/24/2021    CO2 28 04/24/2021    BUN 19 04/24/2021    CREATININE 0 90 04/24/2021    GLUF 86 04/24/2021    CALCIUM 9 0 04/24/2021    AST 18 01/08/2020    ALT 26 01/08/2020    ALKPHOS 51 01/08/2020    EGFR 79 04/24/2021       Current Medications: Reviewed  Allergies: Reviewed  PMH/FH/SH:  Reviewed      Vital Sign:    Body surface area is 2 06 meters squared      Wt Readings from Last 3 Encounters:   10/28/22 85 7 kg (189 lb)   12/07/21 82 6 kg (182 lb)   11/18/21 83 5 kg (184 lb)        Temp Readings from Last 3 Encounters:   10/28/22 97 5 °F (36 4 °C) (Temporal)   12/07/21 (!) 97 1 °F (36 2 °C) (Temporal)   11/17/21 98 °F (36 7 °C) (Tympanic Core)        BP Readings from Last 3 Encounters:   10/28/22 110/60   11/17/21 120/74   11/17/21 120/74         Pulse Readings from Last 3 Encounters:   10/28/22 72   11/17/21 80   11/17/21 80     @LASTSAO2(3)@

## 2022-10-28 NOTE — PROGRESS NOTES
Surgical Oncology Follow Up       [facility] SURGICAL ONCOLOGY [facility] [address]    Robbie Hopkins  1980  [id number]  [facility] SURGICAL ONCOLOGY [place]  [place] [id number]    Chief Complaint   Patient presents with   • Follow-up       Assessment/Plan:  1  Malignant neoplasm of upper-outer quadrant of left breast in female, estrogen receptor positive ([address] )  - 6 month follow up    2  Use of tamoxifen (Nolvadex)  - continue use per medical oncology      Discussion/Summary:  Patient is a 77-year-old female presenting today for a six-month follow-up for left breast cancer diagnosed in August 2019  Pathology revealed invasive mammary carcinoma ER/AZ positive, HER2 negative  She underwent genetic testing which was negative  She had a left breast lumpectomy with sentinel node biopsy with Dr Vanessa Bonilla  She had adjuvant chemotherapy and whole breast radiation therapy  She is currently on tamoxifen  She will be due for her diagnostic mammogram next month  There were no concerns on her breast exam  I will see the patient back in 6 months or sooner should the need arise  She was instructed to call with any questions or concerns prior to this time  All questions were answered today  History of Present Illness:     Oncology History   Malignant neoplasm of upper-outer quadrant of left breast in female, estrogen receptor positive ([facility] [address] )   8/13/2019 Biopsy    Left breast ultrasound-guided biopsy  A  2 o'clock, 14 cm from nipple  Invasive mammary carcinoma of no special type (ductal, not otherwise specified)  Grade 2  ER 90  AZ 60  HER2 1+    B  Left axillary lymph node  Portion of lymph node, negative for carcinoma     8/16/2019 Genetic Testing    The following genes were evaluated: SUDHA, BRCA1, BRCA2, CDH1, CHEK2, PALB2, PTEN, STK11, TP53  Negative result   No pathogenic sequence variants or deletions/dupllications identified  [facility]     9/24/2019 Surgery    Left breast needle localized lumpectomy with sentinel lymph node biopsy  Invasive breast carcinoma of no special type (ductal NST)  Grade 2  1 9 cm  Margins negative  0/3 Lymph nodes  Anatomic/Prognostic Stage IA     10/11/2019 Genomic Testing    MammaPrint   High Risk     11/4/2019 -  Chemotherapy    cyclophosphamide (CYTOXAN) 1,200 mg in sodium chloride 0 9 % 250 mL IVPB, 600 mg/m2 = 1,200 mg, Intravenous, Once, 4 of 4 cycles  Administration: 1,200 mg (11/4/2019), 1,200 mg (11/22/2019), 1,200 mg (12/16/2019), 1,200 mg (1/10/2020)  DOCEtaxel (TAXOTERE) 150 mg in sodium chloride 0 9 % 250 mL chemo infusion, 75 mg/m2 = 150 mg, Intravenous, Once, 4 of 4 cycles  Administration: 150 mg (11/4/2019), 150 mg (11/22/2019), 150 mg (12/16/2019), 150 mg (1/10/2020)       2/11/2020 - 3/9/2020 Radiation    Course: C1    Plan ID Energy Fractions Dose per Fraction (cGy) Dose Correction (cGy) Total Dose Delivered (cGy) Elapsed Days   BH L BREAST 6X 15 / 15 267 0 4,005 20   L BRST BOOST 9E 5 / 5 200 0 1,000 6      Treatment dates:  C1: 2/11/2020 - 3/9/2020       3/2020 -  Hormone Therapy    Tamoxifen 20 mg PO daily          -Interval History: Patient is a 18-year-old female presenting today for a six-month follow-up for left breast cancer diagnosed in August 2019  She is currently on tamoxifen  Patient denies changes on her breast exam  She denies persistent headaches, bone pain, back pain, shortness of breath, or abdominal pain  Review of Systems:  Review of Systems   Constitutional: Negative for activity change, appetite change, fatigue and unexpected weight change  Respiratory: Negative for cough and shortness of breath  Cardiovascular: Negative for chest pain  Gastrointestinal: Negative for abdominal pain, diarrhea, nausea and vomiting  Endocrine: Negative for heat intolerance  Musculoskeletal: Negative for arthralgias, back pain and myalgias  Skin: Negative for rash  Neurological: Negative for weakness and headaches  Hematological: Negative for adenopathy  Patient Active Problem List   Diagnosis   • Positive GBS test   • Malignant neoplasm of upper-outer quadrant of left breast in female, estrogen receptor positive ([facility] [address] )   • Use of tamoxifen (Nolvadex)   • Obstructive sleep apnea   • Sleep-disordered breathing     Past Medical History:   Diagnosis Date   • Abnormal Pap smear of cervix    • BRCA gene mutation negative 2019    [facility]   • Forceps delivery with baby delivered 2019   • History of chemotherapy 2019   • HPV (human papilloma virus) infection    • Papanicolaou smear 2017   • Postpartum depression     As per patient's spouse "it got severe"   • Previous  labor affecting pregnancy, antepartum 4/10/2019   • Primary breast malignancy, left ([facility] [address] ) 2019   • Rh negative state in antepartum period    • Varicella     Vaccinated     Past Surgical History:   Procedure Laterality Date   • BLEPHAROPLASTY Bilateral 2021    Procedure: UPPER BLEPHAROPLASTY WITH LATERAL TEMPORAL BROWLIFT;  Surgeon: Jonathon Adams MD;  [facility];  Service: Plastics   • BREAST BIOPSY Left 2019    us bx   • BREAST LUMPECTOMY Left 2019    Procedure: BREAST NEEDLE LOCALIZED LUMPECTOMY (NEEDLE LOC AT 0930); Surgeon: Michael Frankel MD;  [facility];  Service: Surgical Oncology   • COLPOSCOPY     • LYMPH NODE BIOPSY Left 2019    Procedure: SENTINEL LYMPH NODE BIOPSY; LYMPHATIC MAPPING WITH BLUE DYE AND RADIOACTIVE DYE (INJECT AT 1130 BY DR COLEMAN IN THE OR);   Surgeon: Michael Frankel MD;  [facility];  Service: Surgical Oncology   • MAMMO NEEDLE LOCALIZATION LEFT (ALL INC) Left 2019   • IN EXC SKIN BENIG >4 CM TRUNK,ARM,LEG Right 2021    Procedure: CHEST LESION EXCISION;  Surgeon: Jonathon Adams MD;  [facility];  Service: Plastics   • US GUIDED BREAST BIOPSY LEFT COMPLETE Left 8/13/2019   • US GUIDED BREAST LYMPH NODE BIOPSY LEFT Left 8/13/2019     Family History   Problem Relation Age of Onset   • Stomach cancer Mother 37   • Diabetes Maternal Grandmother    • No Known Problems Father    • No Known Problems Sister    • Cancer Other      Social History     Socioeconomic History   • Marital status: /Civil Union     Spouse name: Not on file   • Number of children: Not on file   • Years of education: Not on file   • Highest education level: Not on file   Occupational History   • Not on file   Tobacco Use   • Smoking status: Former Smoker   • Smokeless tobacco: Never Used   • Tobacco comment: only social smoking many years ago   Vaping Use   • Vaping Use: Never used   Substance and Sexual Activity   • Alcohol use:  Yes   • Drug use: No   • Sexual activity: Yes   Other Topics Concern   • Not on file   Social History Narrative   • Not on file     Social Determinants of Health     Financial Resource Strain: Not on file   Food Insecurity: Not on file   Transportation Needs: Not on file   Physical Activity: Not on file   Stress: Not on file   Social Connections: Not on file   Intimate Partner Violence: Not on file   Housing Stability: Not on file       Current Outpatient Medications:   •  acetaminophen-codeine (TYLENOL #3) 300-30 mg per tablet, Take 1 tablet by mouth every 4 (four) hours as needed for moderate pain for up to 10 doses, Disp: 10 tablet, Rfl: 0  •  Cholecalciferol (Vitamin D-3) 25 MCG (1000 UT) CAPS, Take 1 capsule by mouth daily, Disp: , Rfl:   •  FOLIC ACID PO, Take 1 tablet by mouth daily, Disp: , Rfl:   •  Multiple Vitamins-Minerals (MULTIVITAMIN WOMEN) TABS, Take 1 tablet by mouth daily, Disp: , Rfl:   •  Prenatal Vit-Fe Fumarate-FA (PRENATAL PO), Take 1 tablet by mouth daily, Disp: , Rfl:   •  Pyridoxine HCl (VITAMIN B-6) 25 MG tablet, Take by mouth, Disp: , Rfl:   •  tamoxifen (NOLVADEX) 20 mg tablet, take 1 tablet by mouth once daily, Disp: 90 tablet, Rfl: 3  Allergies   Allergen Reactions   • Bee Venom      Vitals:    10/28/22 0853   BP: 110/60   Pulse: 72   Resp: 16   Temp: 97 5 °F (36 4 °C)   SpO2: 99%       Physical Exam  Constitutional:       General: She is not in acute distress  Appearance: Normal appearance  Cardiovascular:      Rate and Rhythm: Normal rate and regular rhythm  Pulses: Normal pulses  Heart sounds: Normal heart sounds  Pulmonary:      Effort: Pulmonary effort is normal       Breath sounds: Normal breath sounds  Chest:      Chest wall: No mass  Breasts:      Right: No swelling, bleeding, inverted nipple, mass, nipple discharge, skin change, tenderness, axillary adenopathy or supraclavicular adenopathy  Left: No swelling, bleeding, inverted nipple, mass, nipple discharge, skin change, tenderness, axillary adenopathy or supraclavicular adenopathy  Comments: Left breast lumpectomy scar and sln bx scar  Patient has tenderness on palpation  No masses, nodularity, skin changes, nipple changes or discharge, or adenopathy appreciated on physical exam      Abdominal:      General: Abdomen is flat  Palpations: Abdomen is soft  Lymphadenopathy:      Upper Body:      Right upper body: No supraclavicular, axillary or pectoral adenopathy  Left upper body: No supraclavicular, axillary or pectoral adenopathy  Skin:     General: Skin is warm  Neurological:      General: No focal deficit present  Mental Status: She is alert and oriented to person, place, and time  Psychiatric:         Mood and Affect: Mood normal          Behavior: Behavior normal            Results:    Imaging  No results found  I reviewed the above imaging data  Advance Care Planning/Advance Directives:  Discussed disease status, cancer treatment plans and/or cancer treatment goals with the patient

## 2022-11-21 ENCOUNTER — ANNUAL EXAM (OUTPATIENT)
Dept: GYNECOLOGY | Facility: CLINIC | Age: 42
End: 2022-11-21

## 2022-11-21 VITALS
BODY MASS INDEX: 26.18 KG/M2 | DIASTOLIC BLOOD PRESSURE: 82 MMHG | HEIGHT: 71 IN | WEIGHT: 187 LBS | SYSTOLIC BLOOD PRESSURE: 110 MMHG

## 2022-11-21 DIAGNOSIS — Z01.419 ENCOUNTER FOR ANNUAL ROUTINE GYNECOLOGICAL EXAMINATION: ICD-10-CM

## 2022-11-21 DIAGNOSIS — Z85.3 HISTORY OF BREAST CANCER: ICD-10-CM

## 2022-11-21 DIAGNOSIS — Z12.31 SCREENING MAMMOGRAM FOR BREAST CANCER: Primary | ICD-10-CM

## 2022-11-21 NOTE — PROGRESS NOTES
Assessment/Plan:    Normal breast and gyn exam  Left lumpectomy 2019 on tamoxifen therapy  Normal Pap smear   Irregular menstrual cycles on tamoxifen  Normal mammogram 2021  Would like to go through another cycle of IVF    Plan:  Plan:  Refer to reproductive endocrinology  Continue healthy diet exercise  Rx mammogram   Continue prenatal vitamins  Subjective:   x2     Patient ID: Annika Ashley is a 38 y o  female presents for annual exam no complaints  Patient was diagnosed during her pregnancy in 2019 with breast cancer  She had surgery following delivery  She has been on tamoxifen since then  She over her oncologist about having another cycle of IVF  He seemed to think it would be fine  Her eggs are stored in [place]  She would like to get him transferred to the [place] for IVF  I informed her that reproductive endocrinology year would have a better handle on the situation  Review of Systems   Constitutional: Negative  Negative for fatigue, fever and unexpected weight change  HENT: Negative  Eyes: Negative  Respiratory: Negative  Negative for chest tightness, shortness of breath, wheezing and stridor  Cardiovascular: Negative  Negative for chest pain, palpitations and leg swelling  Gastrointestinal: Negative  Negative for abdominal pain, blood in stool, diarrhea, nausea, rectal pain and vomiting  Endocrine: Negative  Genitourinary: Negative for dysuria, frequency, vaginal bleeding, vaginal discharge and vaginal pain  Musculoskeletal: Negative  Skin: Negative  Allergic/Immunologic: Negative  Neurological: Negative  Hematological: Negative  Psychiatric/Behavioral: Negative  All other systems reviewed and are negative          Objective:      /82   Ht 5' 11" (1 803 m)   Wt 84 8 kg (187 lb)   LMP 2022 (Exact Date)   BMI 26 08 kg/m²          Physical Exam  Constitutional:       Appearance: She is well-developed and well-nourished  HENT:      Head: Normocephalic and atraumatic  Neck:      Thyroid: No thyromegaly  Trachea: No tracheal deviation  Cardiovascular:      Rate and Rhythm: Normal rate and regular rhythm  Heart sounds: Normal heart sounds  Pulmonary:      Effort: Pulmonary effort is normal  No respiratory distress  Breath sounds: Normal breath sounds  No stridor  No wheezing or rales  Chest:      Chest wall: No tenderness  Breasts:  No discharge from either breast    No breast swelling, tenderness and bleeding  Breasts are symmetrical       Right: No inverted nipple, mass, nipple discharge, skin change or tenderness  Left: No inverted nipple, mass, nipple discharge, skin change or tenderness  Abdominal:      General: Bowel sounds are normal  There is no distension  Palpations: Abdomen is soft  There is no mass  Tenderness: There is no abdominal tenderness  There is no guarding or rebound  Hernia: No hernia is present  There is no hernia in the right inguinal area or left inguinal area  Genitourinary:     Labia: No labial fusion  Right: No rash, tenderness, lesion or injury  Left: No rash, tenderness, lesion or injury  Vagina: Normal  No signs of injury and foreign body  No vaginal discharge, erythema, tenderness or bleeding  Cervix: No cervical motion tenderness, discharge or friability  Uterus: Normal  Not deviated, not enlarged, not fixed and not tender  Adnexa:         Right: No mass, tenderness or fullness  Left: No mass, tenderness or fullness  Rectum: No mass, anal fissure, external hemorrhoid or internal hemorrhoid  Musculoskeletal:      Cervical back: Normal range of motion and neck supple  Lymphadenopathy:      Lower Body: No right inguinal adenopathy  No left inguinal adenopathy  Skin:     General: Skin is warm and dry     Neurological:      Mental Status: She is alert and oriented to person, place, and time  Psychiatric:         Mood and Affect: Mood and affect normal          Behavior: Behavior normal          Thought Content:  Thought content normal          Judgment: Judgment normal

## 2022-11-22 ENCOUNTER — HOSPITAL ENCOUNTER (OUTPATIENT)
Dept: MAMMOGRAPHY | Facility: HOSPITAL | Age: 42
Discharge: HOME | End: 2022-11-22

## 2022-11-22 VITALS — HEIGHT: 71 IN | WEIGHT: 187 LBS | BODY MASS INDEX: 26.18 KG/M2

## 2022-11-22 DIAGNOSIS — R92.8 ABNORMAL MAMMOGRAM: ICD-10-CM

## 2022-11-23 DIAGNOSIS — C50.412 MALIGNANT NEOPLASM OF UPPER-OUTER QUADRANT OF LEFT BREAST IN FEMALE, ESTROGEN RECEPTOR POSITIVE (HCC): ICD-10-CM

## 2022-11-23 DIAGNOSIS — Z17.0 MALIGNANT NEOPLASM OF UPPER-OUTER QUADRANT OF LEFT BREAST IN FEMALE, ESTROGEN RECEPTOR POSITIVE (HCC): ICD-10-CM

## 2022-11-23 RX ORDER — TAMOXIFEN CITRATE 20 MG/1
20 TABLET ORAL DAILY
Qty: 90 TABLET | Refills: 0 | Status: SHIPPED | OUTPATIENT
Start: 2022-11-23

## 2023-05-19 NOTE — ANESTHESIA PROCEDURE NOTES
Epidural Block    Patient location during procedure: OB  Start time: 6/15/2018 11:20 PM  Reason for block: procedure for pain, at surgeon's request and post-op pain management  Staffing  Anesthesiologist: Melissa Siegel  Performed: anesthesiologist   Preanesthetic Checklist  Completed: patient identified, site marked, surgical consent, pre-op evaluation, timeout performed, IV checked, risks and benefits discussed and monitors and equipment checked  Epidural  Patient position: sitting  Prep: Betadine  Patient monitoring: heart rate, cardiac monitor, continuous pulse ox and frequent blood pressure checks  Approach: midline  Location: lumbar (1-5)  Injection technique: DILCIA air  Needle  Needle type: Tuohy   Needle gauge: 18 G  Catheter type: end hole  Catheter size: 20 G  Catheter at skin depth: 11 cm  Test dose: negative and lidocaine 1 5% with epinephrine 1-to-200,000  Assessment  Sensory level: [id number] aspiration for CSF, negative aspiration for heme and no paresthesia on injection  patient tolerated the procedure well with no immediate complications Him/He

## 2023-06-29 ENCOUNTER — TELEPHONE (OUTPATIENT)
Dept: HEMATOLOGY ONCOLOGY | Facility: CLINIC | Age: 43
End: 2023-06-29

## 2023-06-29 NOTE — TELEPHONE ENCOUNTER
Patient Call    Who are you speaking with? Patient and Spouse    If it is not the patient, are they listed on an active communication consent form? N/A   What is the reason for this call? Needs a clearance for in-vitro fertilization   Does this require a call back? Yes   If a call back is required, please list best call back number [phone]   If a call back is required, advise that a message will be forwarded to their care team and someone will return their call as soon as possible  Did you relay this information to the patient?  Yes

## 2023-06-29 NOTE — TELEPHONE ENCOUNTER
Patient stated that she is still taking the Tamoxifen   She was going to stop it tomorrow for egg implanting in October

## 2023-08-01 ENCOUNTER — TELEPHONE (OUTPATIENT)
Dept: HEMATOLOGY ONCOLOGY | Facility: CLINIC | Age: 43
End: 2023-08-01

## 2023-08-01 ENCOUNTER — TRANSCRIBE ORDERS (OUTPATIENT)
Dept: GYNECOLOGY | Facility: CLINIC | Age: 43
End: 2023-08-01

## 2023-08-01 DIAGNOSIS — Z85.3 PERSONAL HISTORY OF BREAST CANCER: Primary | ICD-10-CM

## 2023-08-01 NOTE — TELEPHONE ENCOUNTER
Appointment Change  Cancel, Reschedule, Change to Virtual      Who are you speaking with? Spouse   If it is not the patient, are they listed on an active communication consent form? Yes   Which provider is the appointment scheduled with? SHERITA Moore   When is the appointment scheduled? Please list date and time  09/01/2023 @ 3:30PM    At which location is the appointment scheduled to take place? [place]   Was the appointment rescheduled or changed from an in person visit to a virtual visit? If so, please list the details of the change. Yes,10/05/2023 @3:30PM    What is the reason for the appointment change? [place]   Was STAR transport scheduled for this visit? No   Does STAR transport need to be scheduled for the new visit (if applicable) No   Does the patient need an infusion appointment rescheduled? No   Does the patient have an infusion appointment scheduled? If so, when? No   Is the patient undergoing chemotherapy? No   Was the no-show policy reviewed for appointments being changed with less then 24 hours of notice?  No

## 2023-08-01 NOTE — TELEPHONE ENCOUNTER
Appointment Change  Cancel, Reschedule, Change to Virtual      Who are you speaking with? Spouse   If it is not the patient, are they listed on an active communication consent form? Yes   Which provider is the appointment scheduled with? Dr. Corby Love   When is the appointment scheduled? Please list date and time  09/01/2023 @ 3PM    At which location is the appointment scheduled to take place? [facility]   Was the appointment rescheduled or changed from an in person visit to a virtual visit? If so, please list the details of the change. Yes, 10/05/2023 @3PM    What is the reason for the appointment change? [place] is out of the [place] until [place]26   Was STAR transport scheduled for this visit? No   Does STAR transport need to be scheduled for the new visit (if applicable) No   Does the patient need an infusion appointment rescheduled? No   Does the patient have an infusion appointment scheduled? If so, when? No   Is the patient undergoing chemotherapy? No   Was the no-show policy reviewed for appointments being changed with less then 24 hours of notice?  No

## 2023-09-11 NOTE — DISCHARGE INSTRUCTIONS
Parto vaginal   LO QUE NECESITA SABER:   Se produce un alumbramiento vaginal cuando el bebé nace por la vagina (canal del parto)  INSTRUCCIONES SOBRE EL TANIKA HOSPITALARIA:   Busque atención médica de inmediato si:   · Kwok pierna se siente cálida, sensible y adolorida  Se podría jadiel inflamado y leon  · Usted tiene fiebre  · Usted está orinando muy poco o nada en absoluto  · Usted presenta sangrado vaginal que empapa 1 toalla higiénica o más en 1 hora  · Usted se siente mareado, débil o tiene sensación de [place]  Pregúntele a kwok [facility] vitaminas y minerales son adecuados para usted  · El dolor abdominal o en el perineo no mejora, o empeora  · Usted se siente deprimida  · Usted tiene preguntas o inquietudes acerca de kwok condición o cuidado  Medicamentos:  · AINEs (Analgésicos antiinflamatorios no esteroides) daisy el ibuprofeno, ayudan a disminuir la inflamación, el dolor y la fiebre  Lynn medicamento esta disponible con o sin mehdi receta médica  Los AINEs pueden causar sangrado estomacal o problemas renales en ciertas personas  Si usted yessy un medicamento anticoagulante, siempre pregúntele a kwok médico si los PAULO son seguros para usted  Siempre jaja la etiqueta de lynn medicamento y [place] instrucciones  · Los laxantes  le facilitan las evacuaciones intestinales  Es posible que deba francine lynn medicamento para tratar o para prevenir el estreñimiento  · [place] annie medicamentos daisy se le haya indicado  Consulte con kwok médico si usted shanna que kwok medicamento no le está ayudando o si presenta efectos secundarios  Infórmele si es alérgico a cualquier medicamento  Mantenga mehdi lista actualizada de los [place], las vitaminas y los productos herbales que yessy  Incluya los siguientes datos de los medicamentos: cantidad, frecuencia y motivo de administración  Traiga con usted la lista o los envases de la píldoras a annie citas de seguimiento   Lleve la lista de los medicamentos con usted Patient informed and verbalized understanding.  Patient states she doesn't want to see neurology at the moment will call office if she changes her mind en sophia de mehdi emergencia  Programe mehdi consulta de seguimiento con kwok médico:  La mayoría de las mujeres regresan a las 6 semanas después de un alumbramiento vaginal  Si corresponde, consulte con kwok médico cómo cuidar de annie heridas o puntos de sutura  Anote annie preguntas para que se acuerde de hacerlas alessia annie visitas  Actividad:  Descanse el mayor tiempo posible  No realice [employer] tiempo a la vez  Es posible que pueda hacer algo de ejercicio poco después de que nazca el bebé  Consulte con kwok médico antes de comenzar a ejercitarse  Si trabaja fuera de casa, pregunte cuándo puede reintegrarse a kwok trabajo  Ejercicios de Kegel:  Los ejercicios de Kegel pueden ayudar a que los músculos de kwok vagina y recto se recuperen más rápidamente  [facility] [employer] [facility] ejercicios de Kegel contrayendo y relajando los músculos alrededor de la vagina  Los ejercicios de Kegel ayudan a [employer]  Cuidado de los senos:  Es posible que los senos se sientan llenos y duros cuando le baje la [place]  Pregunte cómo cuidar de annie senos, incluso si decide no amamantar al bebé  Estreñimiento:  Es posible que esté estreñida por un tiempo después de que nazca el bebé  No pierre fuerza si las heces son [place] duras  Consuma alimentos con un alto contenido de [place] y Indra mayor cantidad de líquido para evitar el estreñimiento  Ejemplos de alimentos ricos en fibra son las frutas, y salvado  El [place] de ciruelas y [place] agua son muy buenos para francine  Es probable que [employer] indiquen consumir fibra y francine medicamentos de heces de venta sin [place]  [place] estos artículos según indicaciones médicas  Pregunte cómo puede prevenir o tratar las hemorroides  Cuidado del perineo:  El perineo es el área que se encuentra entre la vagina y el ano  Yao Coreas y [place]afos  [place] la ayudará a curar y a prevenir la infección  Lave el área suavemente con agua y jabón cuando se bañe o tome mehdi [place] Balzarine   Enjuague el perineo con agua tibia después de orinar o de defecar  Limon médico podría sugerirle que use un baño de asiento con agua tibia para aliviar el dolor  Llene la stephen con 4 a 6 pulgadas de agua tibia  [address] usar un recipiente para baño de asiento que quepa en el inodoro  [place] en el baño de stephen por 20 minutos  Diego esto 2 a 3 veces a la semana según le indicaron  El agua cálida va a ayudara reducir el dolor y la inflamación  Secreción vaginal:  Tendrá secreción vaginal, llamada loquios, después de halley a amanda  El sangrado es leon o marrón oscuro con coágulos de1 a 3 días después del nacimieneto del bebé  La cantidad disminuirá y se tornará richie pálido o marrón alessia 3 a 10 días  Se tornará lopez o amarillo en el día 10 o 15  Por lo general, el loquios desaparece dentro de las 3 semanas  Use mehdi compresa higiénica, en lugar de tampones, para evitar que contraiga mehdi infección vaginal  Tendrá loquios hasta 3 semanas después del nacimiento de limon bebé  Períodos menstruales:  Es posible que vuelva a tener limon período menstrual dentro de 7 a 9 semanas de que haya nacido el bebé  Podría tardar Candelaria Patel en volver a tener limon menstruación si está amamantando a limon bebé  Puede volver a quedar embarazada, incluso si no tiene períodos menstruales  Consulte con limon médico acerca de qué método anticonceptivo usar si no desea volver a Zi Escobar  Cambios en el estado de ánimo:  Muchas nuevas madres experimentan algún cambio de humor después de halley a amanda  Algunos de [facility] se deben a la falta de sueño, los cambios hormonales y el cuidado del bebé  Algunos cambios de humor pueden ser serios, daisy la depresión posparto  Hable con limon médico si no se siente capaz de cuidarse a usted misma y a limon bebé  Relaciones sexuales:  No tenga relaciones sexuales hasta que limon médico lo autorice   Es posible que no sienta deseo sexual o que sienta dolor con la actividad sexual  Puede usar un lubricante (gel) vaginal para que la actividad sexual sea más placentera  © 2017 [address] [employer] Information is for End User's use only and may not be sold, redistributed or otherwise used for commercial purposes  All illustrations and images included in CareNotes® are the copyrighted property of A ZULEIKA A M , Inc  or [address]  Esta información es sólo para uso en educación  Kwok intención no es darle un consejo médico sobre enfermedades o tratamientos  Colsulte con kwok Shaun Sheridan farmacéutico antes de seguir cualquier régimen médico para saber si es seguro y efectivo para usted

## 2023-09-20 ENCOUNTER — TELEPHONE (OUTPATIENT)
Dept: HEMATOLOGY ONCOLOGY | Facility: CLINIC | Age: 43
End: 2023-09-20

## 2023-09-20 NOTE — TELEPHONE ENCOUNTER
Hi I spoke to Jacklyn, patient would need to be seen on a different day in [facility] if it needs to be the same day/location as Dr. Ambrose Giraldo unless if patient is willing to go to [facility] on the same day.

## 2023-09-20 NOTE — TELEPHONE ENCOUNTER
Cristobal Milligan and Stanton Torres are only BOTH at the [place] location on Fridays. Neither of them have availability around the same time until after December. Is it even appropriate for the patient to be scheduled out that far? Not sure what to do at this point unless patient/ are ok with the appointments that have already been scheduled.    -patient's  is a [facility]'s provider.

## 2023-09-20 NOTE — TELEPHONE ENCOUNTER
I called patient's  to make him aware that there were no dates anytime soon that could algin for both appointments with Ana/. He mentioned that his wife/patient can see any provider, I mentioned that I would check 's schedule. Patient's  who is a provider himself mentioned how it was important for both appointments to be on the same day, when I mentioned that we'd have to potentially push out the appointment's out to end of December he was disappointed. Unfortunately there were no dates that would align anytime soon on 's schedule either. No other providers are here on a Friday other than [facility]/SHERITA Keller. Beings that patient is not currently on chemotherapy treatment and does not have metastatic diease I thought it would be appropriate to schedule patient with SHERITA-Hoa Preston. Patient is on hormonal therapy (tamoxifen). I was able to make patient appointments with [facility]"" for 10/19. Patient's  mentioned that they will have questions about patient potentially starting IVF treatment and wanted to make sure the CRNP/Provider were aware.

## 2023-09-20 NOTE — TELEPHONE ENCOUNTER
YUNG  DR donna ALVAREZ   Who are you speaking with? Spouse   If it is not the patient, are they listed on an active communication consent form? Yes   Is this a YUNG or DR donna ALVAREZ YUNG   Which provider is patient currently scheduled or established with? Dr. Ginger Mcintosh   What is the original appointment date and time? 10/5/23 3:00PM   At which location is the appointment scheduled to take place? [facility]   Which provider is the patient transitioning care to? Dr. Gracia Odonnell   What is the new appointment date and time? 10/10/23 3:40PM   At which location is the new appointment scheduled to take place? [facility]   What is the reason for this change?  Provider

## 2023-09-20 NOTE — TELEPHONE ENCOUNTER
Patient Call    Who are you speaking with? Spouse    If it is not the patient, are they listed on an active communication consent form? Yes   What is the reason for this call? I called the patient's spouse in regards to rescheduling the patient's upcoming appointment with Dr Jeet Jackson. I was able to reschedule the patient to 10/10/23 with Dr Darien Ryan, however, Ella Flowers stated that they need to do their appointments the same day with Stephanie Babin as it is very difficult for him to get off of work. I let Hiren know that I did not see anything in the schedule to where I would be able to place her with Ladonna Knight in that same day/time frame. Hiren is requesting to see if anything can be done so that they can see both Dr Darien Ryan and Stephanie Babin in the same day. Does this require a call back? Yes   If a call back is required, please list best call back number [phone]   If a call back is required, advise that a message will be forwarded to their care team and someone will return their call as soon as possible. Did you relay this information to the patient?  Yes

## 2023-10-05 ENCOUNTER — TELEPHONE (OUTPATIENT)
Dept: HEMATOLOGY ONCOLOGY | Facility: CLINIC | Age: 43
End: 2023-10-05

## 2023-10-05 NOTE — TELEPHONE ENCOUNTER
Patient Call    Who are you speaking with?    If it is not the patient, are they listed on an active communication consent form? yes   What is the reason for this call? In vitro fertilization, held tamoxifen   Does this require a call back? no   If a call back is required, please list best call back number [phone]   If a call back is required, advise that a message will be forwarded to their care team and someone will return their call as soon as possible. Did you relay this information to the patient?  yes

## 2023-10-05 NOTE — TELEPHONE ENCOUNTER
Appointment Change  Cancel, Reschedule, Change to Virtual      Who are you speaking with? Patient   If it is not the patient, is the caller listed on the communication consent form? N/A   Which provider is the appointment scheduled with? Dr. Sea Dwyer and SHERITA Benson   When was the original appointment scheduled? Please list date and time 10/19/23 2PM [id number] [id number]  10/19/23 2:30PM [facility]   At which location is the appointment scheduled to take place? [facility]   Was the appointment rescheduled? Was the appointment changed from an in person visit to a virtual visit? If so, please list the details of the change. 10/10/23 11AM Dr. Meeta Ornelas  10/10/23 11:30AM [facility]   What is the reason for the appointment change?  Hematology reason: Provider  Surgical Oncology reason: Patient wanted appointments to be on the same day

## 2023-10-10 ENCOUNTER — OFFICE VISIT (OUTPATIENT)
Dept: SURGICAL ONCOLOGY | Facility: CLINIC | Age: 43
End: 2023-10-10
Payer: COMMERCIAL

## 2023-10-10 ENCOUNTER — OFFICE VISIT (OUTPATIENT)
Dept: HEMATOLOGY ONCOLOGY | Facility: CLINIC | Age: 43
End: 2023-10-10
Payer: COMMERCIAL

## 2023-10-10 ENCOUNTER — TELEPHONE (OUTPATIENT)
Dept: HEMATOLOGY ONCOLOGY | Facility: CLINIC | Age: 43
End: 2023-10-10

## 2023-10-10 VITALS
TEMPERATURE: 97.9 F | OXYGEN SATURATION: 98 % | HEIGHT: 71 IN | BODY MASS INDEX: 26.74 KG/M2 | RESPIRATION RATE: 16 BRPM | DIASTOLIC BLOOD PRESSURE: 82 MMHG | SYSTOLIC BLOOD PRESSURE: 124 MMHG | WEIGHT: 191 LBS | HEART RATE: 81 BPM

## 2023-10-10 VITALS
DIASTOLIC BLOOD PRESSURE: 82 MMHG | RESPIRATION RATE: 17 BRPM | WEIGHT: 191.8 LBS | BODY MASS INDEX: 26.85 KG/M2 | TEMPERATURE: 97.9 F | OXYGEN SATURATION: 98 % | SYSTOLIC BLOOD PRESSURE: 124 MMHG | HEART RATE: 81 BPM | HEIGHT: 71 IN

## 2023-10-10 DIAGNOSIS — C50.412 MALIGNANT NEOPLASM OF UPPER-OUTER QUADRANT OF LEFT BREAST IN FEMALE, ESTROGEN RECEPTOR POSITIVE: Primary | ICD-10-CM

## 2023-10-10 DIAGNOSIS — Z17.0 MALIGNANT NEOPLASM OF UPPER-OUTER QUADRANT OF LEFT BREAST IN FEMALE, ESTROGEN RECEPTOR POSITIVE: Primary | ICD-10-CM

## 2023-10-10 PROCEDURE — 99213 OFFICE O/P EST LOW 20 MIN: CPT

## 2023-10-10 PROCEDURE — 99214 OFFICE O/P EST MOD 30 MIN: CPT | Performed by: INTERNAL MEDICINE

## 2023-10-10 RX ORDER — TAMOXIFEN CITRATE 20 MG/1
20 TABLET ORAL DAILY
Qty: 90 TABLET | Refills: 1 | Status: SHIPPED | OUTPATIENT
Start: 2023-10-10

## 2023-10-10 NOTE — TELEPHONE ENCOUNTER
Spoke to patient to see if we needed to reschedule appt she expressed she was in the building and would be up. I called again to see if she was in the right building but was not she was in the hospital so I told her our address and the building we were in.

## 2023-10-10 NOTE — LETTER
October 10, 2023     Lanny Becerra MD  [address] [place] [address] 1200 [facility]    Patient: Bishnu Huerta   YOB: 1980   Date of Visit: 10/10/2023       Dear Dr. Arden Palomares:    Thank you for referring Bishnu Huerta to me for evaluation. Below are my notes for this consultation. If you have questions, please do not hesitate to call me. I look forward to following your patient along with you. Sincerely,        SHERITA Pike        CC: No Recipients    Lina SHERITA Irizarry  10/10/2023  2:25 PM  Sign when Signing Visit               Surgical Oncology Follow Up       [address] [facility]  [address] [facility] [address]  [address] [facility]  1980  [id number]  [address] [facility]  [address] [facility] [address] [phone]    Diagnoses and all orders for this visit:    Malignant neoplasm of upper-outer quadrant of left breast in female, estrogen receptor positive         Chief Complaint   Patient presents with   • Follow-up       Return in about 6 months (around 4/10/2024) for Office Visit. Oncology History   Malignant neoplasm of upper-outer quadrant of left breast in female, estrogen receptor positive    8/13/2019 Biopsy    Left breast ultrasound-guided biopsy  A. 2 o'clock, 14 cm from nipple  Invasive mammary carcinoma of no special type (ductal, not otherwise specified)  Grade 2  ER 90  RI 60  HER2 1+    B. Left axillary lymph node  Portion of lymph node, negative for carcinoma     8/16/2019 Genetic Testing    The following genes were evaluated: SUDHA, BRCA1, BRCA2, CDH1, CHEK2, PALB2, PTEN, STK11, TP53  Negative result.  No pathogenic sequence variants or deletions/dupllications identified  [facility]     9/24/2019 Surgery    Left breast needle localized lumpectomy with sentinel lymph node biopsy  Invasive breast carcinoma of no special type (ductal NST)  Grade 2  1.9 cm  Margins negative  0/3 Lymph nodes  Anatomic/Prognostic Stage IA     9/24/2019 -  Cancer Staged    Staging form: Breast, AJCC 8th Edition  - Pathologic stage from 9/24/2019: Stage IA (pT1c, pN0(sn), cM0, G2, ER+, GA+, HER2-) - Signed by SHERITA Trejo on 10/5/2023  Stage prefix: Initial diagnosis  Neoadjuvant therapy: No  Method of lymph node assessment: Christine lymph node biopsy  Histologic grading system: 3 grade system  Laterality: Left  Tumor size (mm): 19       10/11/2019 Genomic Testing    MammaPrint   High Risk     11/4/2019 -  Chemotherapy    cyclophosphamide (CYTOXAN) 1,200 mg in sodium chloride 0.9 % 250 mL IVPB, 600 mg/m2 = 1,200 mg, Intravenous, Once, 4 of 4 cycles  Administration: 1,200 mg (11/4/2019), 1,200 mg (11/22/2019), 1,200 mg (12/16/2019), 1,200 mg (1/10/2020)  DOCEtaxel (TAXOTERE) 150 mg in sodium chloride 0.9 % 250 mL chemo infusion, 75 mg/m2 = 150 mg, Intravenous, Once, 4 of 4 cycles  Administration: 150 mg (11/4/2019), 150 mg (11/22/2019), 150 mg (12/16/2019), 150 mg (1/10/2020)       2/11/2020 - 3/9/2020 Radiation    Course: C1    Plan ID Energy Fractions Dose per Fraction (cGy) Dose Correction (cGy) Total Dose Delivered (cGy) Elapsed Days   BH L BREAST 6X 15 / 15 267 0 4,005 20   L BRST BOOST 9E 5 / 5 200 0 1,000 6      Treatment dates:  C1: 2/11/2020 - 3/9/2020       3/2020 -  Hormone Therapy    Tamoxifen 20 mg PO daily             History of Present Illness: The patient returns to the office today in follow-up for her history of a stage IA left breast cancer. This was ER/GA+ Her2 - and found to be high risk on Mammaprint testing. She is currently LAVON at 4 years following breast conservation therapy with adjuvant chemotherapy and radiation. She has been maintained on Tamoxifen, but is considering undergoing IVF and may be stopping the hormone therapy early.   She is concerned about whether or not pursuing IVF will cause her cancer to come back, and is seeking an opinion on this today. She denies any new lumps or masses, skin changes or tenderness. She denies any new headaches, dizziness, or bone or back pain. Her next mammogram is already scheduled for next month. Review of Systems   Constitutional: Negative for activity change, appetite change, fatigue and unexpected weight change. HENT: Negative. Respiratory: Negative. Negative for cough and shortness of breath. Cardiovascular: Negative. Gastrointestinal: Negative. Negative for abdominal pain, nausea and vomiting. Musculoskeletal: Negative. Skin: Negative. Negative for color change. Neurological: Negative. Negative for dizziness and headaches. Hematological: Negative. Negative for adenopathy. Psychiatric/Behavioral: Negative.               Patient Active Problem List   Diagnosis   • Positive GBS test   • Malignant neoplasm of upper-outer quadrant of left breast in female, estrogen receptor positive    • Use of tamoxifen (Nolvadex)   • Obstructive sleep apnea   • Sleep-disordered breathing     Past Medical History:   Diagnosis Date   • Abnormal Pap smear of cervix    • BRCA gene mutation negative 2019    [employer]   • BRCA1 negative    • BRCA2 negative    • Forceps delivery with baby delivered 2019   • History of chemotherapy 2019   • HPV (human papilloma virus) infection    • Papanicolaou smear 2017   • Postpartum depression     As per patient's spouse "it got severe"   • Previous  labor affecting pregnancy, antepartum 04/10/2019   • Primary breast malignancy, left ([address]) 2019   • Rh negative state in antepartum period    • Varicella     Vaccinated     Past Surgical History:   Procedure Laterality Date   • BLEPHAROPLASTY Bilateral 2021    Procedure: UPPER BLEPHAROPLASTY WITH LATERAL TEMPORAL BROWLIFT;  Surgeon: Brigido Kim MD;  [facility];  Service: Plastics   • BREAST BIOPSY Left 2019    us bx   • BREAST LUMPECTOMY Left 9/24/2019    Procedure: BREAST NEEDLE LOCALIZED LUMPECTOMY (NEEDLE LOC AT 0930); Surgeon: Corbin Santos MD;  Location[facility];  Service: Surgical Oncology   • COLPOSCOPY     • LYMPH NODE BIOPSY Left 9/24/2019    Procedure: SENTINEL LYMPH NODE BIOPSY; LYMPHATIC MAPPING WITH BLUE DYE AND RADIOACTIVE DYE (INJECT AT 1130 BY DR COLEAMN IN THE OR);   Surgeon: Corbin Santos MD;  [facility];  Service: Surgical Oncology   • MAMMO NEEDLE LOCALIZATION LEFT (ALL INC) Left 9/24/2019   • ME EXC SKIN BENIG >4 CM TRUNK,ARM,LEG Right 4/27/2021    Procedure: CHEST LESION EXCISION;  Surgeon: Chaitanya Medina MD;  Location: [facility];  Service: Plastics   • US GUIDED BREAST BIOPSY LEFT COMPLETE Left 8/13/2019   • US GUIDED BREAST LYMPH NODE BIOPSY LEFT Left 8/13/2019     Family History   Problem Relation Age of Onset   • Stomach cancer Mother 37   • Diabetes Maternal Grandmother    • No Known Problems Father    • No Known Problems Sister    • Cancer Other      Social History     Socioeconomic History   • Marital status: /Civil Union     Spouse name: Not on file   • Number of children: Not on file   • Years of education: Not on file   • Highest education level: Not on file   Occupational History   • Not on file   Tobacco Use   • Smoking status: Former   • Smokeless tobacco: Never   • Tobacco comments:     only social smoking many years ago   Vaping Use   • Vaping Use: Never used   Substance and Sexual Activity   • Alcohol use: Not Currently   • Drug use: No   • Sexual activity: Yes   Other Topics Concern   • Not on file   Social History Narrative   • Not on file     Social Determinants of Health     Financial Resource Strain: Not on file   Food Insecurity: Not on file   Transportation Needs: Not on file   Physical Activity: Not on file   Stress: Not on file   Social Connections: Not on file   Intimate Partner Violence: Not on file   Housing Stability: Not on file       Current Outpatient Medications:   •  acetaminophen-codeine (TYLENOL #3) 300-30 mg per tablet, Take 1 tablet by mouth every 4 (four) hours as needed for moderate pain for up to 10 doses, Disp: 10 tablet, Rfl: 0  •  Cholecalciferol (Vitamin D-3) 25 MCG (1000 UT) CAPS, Take 1 capsule by mouth daily, Disp: , Rfl:   •  FOLIC ACID PO, Take 1 tablet by mouth daily, Disp: , Rfl:   •  Multiple Vitamins-Minerals (MULTIVITAMIN WOMEN) TABS, Take 1 tablet by mouth daily, Disp: , Rfl:   •  Prenatal Vit-Fe Fumarate-FA (PRENATAL PO), Take 1 tablet by mouth daily, Disp: , Rfl:   •  Pyridoxine HCl (VITAMIN B-6) 25 MG tablet, Take by mouth, Disp: , Rfl:   •  tamoxifen (NOLVADEX) 20 mg tablet, Take 1 tablet (20 mg total) by mouth daily, Disp: 90 tablet, Rfl: 1  Allergies   Allergen Reactions   • Bee Venom      Vitals:    10/10/23 1213   BP: 124/82   Pulse: 81   Resp: 16   Temp: 97.9 °F (36.6 °C)   SpO2: 98%       Physical Exam  Vitals reviewed. Constitutional:       General: She is not in acute distress. Appearance: Normal appearance. She is normal weight. She is not ill-appearing or toxic-appearing. HENT:      Head: Normocephalic and atraumatic. Nose: Nose normal.      Mouth/Throat:      Mouth: Mucous membranes are moist.   Eyes:      General: No scleral icterus. Conjunctiva/sclera: Conjunctivae normal.   Cardiovascular:      Rate and Rhythm: Normal rate. Pulmonary:      Effort: Pulmonary effort is normal.   Chest:   Breasts:     Right: Normal.      Left: No inverted nipple, mass, nipple discharge, skin change or tenderness. Comments: Right breast is smooth and even. Left breast has stable scarring at prior surgical site. There are no dominant masses, nodules, skin changes or tenderness. I do not appreciate any adenopathy. Musculoskeletal:         General: Normal range of motion. Cervical back: Normal range of motion and neck supple. Lymphadenopathy:      Cervical: No cervical adenopathy.       Upper Body: Right upper body: No supraclavicular, axillary or pectoral adenopathy. Left upper body: No supraclavicular, axillary or pectoral adenopathy. Skin:     General: Skin is warm and dry. Neurological:      General: No focal deficit present. Mental Status: She is alert and oriented to person, place, and time. Psychiatric:         Mood and Affect: Mood normal.         Behavior: Behavior normal.         Thought Content: Thought content normal.         Judgment: Judgment normal.           Discussion/Summary: This is a 36 y/o female who presents today for continued breast cancer surveillance. At this time there is no evidence of disease by physical exam or symptoms. I will see her again in 6 months for another clinical exam, or sooner should the need arise. With regard to her IVF concerns, I have reviewed her pathology and molecular testing. She has already discussed these concerns with her other providers, and I have recommended she strongly consider their recommendations. In addition, I have explained that she needs to weigh the risks versus benefits for herself. All questions were answered today. A total of 20 minutes were spent in face-to-face patient care.

## 2023-10-10 NOTE — LETTER
October 10, 2023       No Recipients    Patient: Alice Gaytna   YOB: 1980   Date of Visit: 10/10/2023       Dear Dr. Ledy Trevino: Thank you for referring Alice Gaytan to me for evaluation. Below are my notes for this consultation. If you have questions, please do not hesitate to call me. I look forward to following your patient along with you. Sincerely,        Virginia Quinones MD        CC:   No Recipients    Virginia Quinones MD  10/10/2023 12:23 PM  Sign when Signing Visit  Hematology Outpatient Follow - Up Note  Alice Gaytan 37 y.o. female MRN: @ Encounter: [id number]        Date:  10/10/2023        Assessment/ Plan:    40-year-old premenopausal female diagnosed with stage Ia invasive ductal carcinoma of the left breast ER 90%, VT 60%, HER2 negative, grade 2    Negative for BRCA1/2 mutation status post lumpectomy with negative sentinel lymph nodes primary 19 mm stage Ia (pT1c, PN 0, grade 2) ER positive, VT positive, HER2 negative    Status post adjuvant TC x4 finished in January 2020, subsequently had been on tamoxifen 20 mg p.o. daily no evidence of disease    She has cryopreserved eggs, thinking of pregnancy with her child    With pregnancy there is limited data however there is an increased risk of recurrence of breast cancer especially with the ER and VT highly positive breast cancer    Patient will think about getting pregnant    She had stopped tamoxifen in July 2023    If she is not wanting to get pregnant she will be back on tamoxifen for at least a total of 5 years        Labs and imaging studies are reviewed by ordering provider once results are available. If there are findings that need immediate attention, you will be contacted when results available. Discussing results and the implication on your healthcare is best discussed in person at your follow-up visit. HPI:    She was at age 40-year-old premenopausal woman who noticed left breast lump 5 months ago.   She brought to medical attention. When she was found her lump, she was pregnant. She was found to have radiographic abnormality in her left breast which was biopsied in August 13, 2019 which showed invasive ductal carcinoma, grade 2. This was ER 90 % positive, NH 60% positive, HER2 negative disease. Subsequently, she delivered baby few weeks after her biopsy. She was seen by Dr. Romana Hearing. She was negative for BRCA gene mutation. Status post  left lumpectomy in September 24, 2019 which showed 19 mm of invasive ductal carcinoma, grade 2. 3 sentinel lymph nodes were all negative for metastatic disease. Dr. Romana Hearing sent her tumor tissue for MammaPrint which came back as high risk, luminal B type    Status post adjuvant chemotherapy utilizing Taxotere/cyclophosphamide completed in January 2020, since March 2020 had been on adjuvant tamoxifen 20 mg p.o. daily with normal menses she has cryopreserved eggs  Interval History:    She is thinking of getting pregnant, she stopped tamoxifen in June 2023    Previous Treatment:         Test Results:    Imaging: No results found. Labs:   Lab Results   Component Value Date    WBC 4.90 04/24/2021    HGB 13.9 04/24/2021    HCT 41.4 (L) 04/24/2021    MCV 90 04/24/2021     04/24/2021     Lab Results   Component Value Date    K 3.9 04/24/2021     04/24/2021    CO2 28 04/24/2021    BUN 19 04/24/2021    CREATININE 0.90 04/24/2021    GLUF 86 04/24/2021    CALCIUM 9.0 04/24/2021    AST 18 01/08/2020    ALT 26 01/08/2020    ALKPHOS 51 01/08/2020    EGFR 79 04/24/2021       No results found for: "IRON", "TIBC", "FERRITIN"    No results found for: "Murleen Berliner"      ROS: Review of Systems   Constitutional: Negative for appetite change, chills, diaphoresis, fatigue and unexpected weight change. HENT:   Negative for mouth sores, nosebleeds, sore throat, trouble swallowing and voice change. Eyes: Negative for eye problems and icterus.    Respiratory: Negative for chest tightness, cough, hemoptysis and wheezing. Cardiovascular: Negative for chest pain, leg swelling and palpitations. Gastrointestinal: Negative for abdominal distention, abdominal pain, blood in stool, constipation, diarrhea, nausea and vomiting. Endocrine: Negative for hot flashes. Genitourinary: Negative for bladder incontinence, difficulty urinating, dyspareunia, dysuria and frequency. Musculoskeletal: Negative for arthralgias, back pain, gait problem, neck pain and neck stiffness. Skin: Negative for itching and rash. Neurological: Negative for dizziness, gait problem, headaches, numbness, seizures and speech difficulty. Hematological: Negative for adenopathy. Does not bruise/bleed easily. Psychiatric/Behavioral: Negative for decreased concentration, depression, sleep disturbance and suicidal ideas. The patient is not nervous/anxious. Current Medications: Reviewed  Allergies: Reviewed  PMH/FH/SH:  Reviewed      Physical Exam:    Body surface area is 2.07 meters squared. Wt Readings from Last 3 Encounters:   10/10/23 86.6 kg (191 lb)   10/10/23 87 kg (191 lb 12.8 oz)   11/22/22 84.8 kg (187 lb)        Temp Readings from Last 3 Encounters:   10/10/23 97.9 °F (36.6 °C)   10/10/23 97.9 °F (36.6 °C) (Temporal)   10/28/22 97.5 °F (36.4 °C) (Temporal)        BP Readings from Last 3 Encounters:   10/10/23 124/82   10/10/23 124/82   11/21/22 110/82         Pulse Readings from Last 3 Encounters:   10/10/23 81   10/10/23 81   10/28/22 72        Physical Exam  Constitutional:       Appearance: Normal appearance. She is normal weight. Musculoskeletal:      Cervical back: Normal range of motion and neck supple. Neurological:      General: No focal deficit present. Mental Status: She is alert and oriented to person, place, and time. Psychiatric:         Mood and Affect: Mood normal.         Behavior: Behavior normal.         Thought Content:  Thought content normal.         Judgment: Judgment normal.         ECO  Goals and Barriers:  Current Goal: Minimize effects of disease. Barriers: None. Patient's Capacity to Self Care:  Patient is able to self care.     Code Status: @[facility]@

## 2023-10-10 NOTE — PROGRESS NOTES
Surgical Oncology Follow Up       [address] [facility] SURGICAL ONCOLOGY [facility]  [address]  [facility] [address]  [address]  1980  [id number]  [address] [facility] SURGICAL ONCOLOGY [facility]  [address]  [facility] [id number] [phone]    Diagnoses and all orders for this visit:    Malignant neoplasm of upper-outer quadrant of left breast in female, estrogen receptor positive         Chief Complaint   Patient presents with   • Follow-up       Return in about 6 months (around 4/10/2024) for Office Visit. Oncology History   Malignant neoplasm of upper-outer quadrant of left breast in female, estrogen receptor positive    8/13/2019 Biopsy    Left breast ultrasound-guided biopsy  A. 2 o'clock, 14 cm from nipple  Invasive mammary carcinoma of no special type (ductal, not otherwise specified)  Grade 2  ER 90  MT 60  HER2 1+    B. Left axillary lymph node  Portion of lymph node, negative for carcinoma     8/16/2019 Genetic Testing    The following genes were evaluated: SUDHA, BRCA1, BRCA2, CDH1, CHEK2, PALB2, PTEN, STK11, TP53  Negative result.  No pathogenic sequence variants or deletions/dupllications identified  [employer]     9/24/2019 Surgery    Left breast needle localized lumpectomy with sentinel lymph node biopsy  Invasive breast carcinoma of no special type (ductal NST)  Grade 2  1.9 cm  Margins negative  0/3 Lymph nodes  Anatomic/Prognostic Stage IA     9/24/2019 -  Cancer Staged    Staging form: Breast, AJCC 8th Edition  - Pathologic stage from 9/24/2019: Stage IA (pT1c, pN0(sn), cM0, G2, ER+, MT+, HER2-) - Signed by SHERITA Pascal on 10/5/2023  Stage prefix: Initial diagnosis  Neoadjuvant therapy: No  Method of lymph node assessment: [place] lymph node biopsy  Histologic grading system: 3 grade system  Laterality: Left  Tumor size (mm): 19       10/11/2019 Genomic Testing MammaPrint   High Risk     11/4/2019 -  Chemotherapy    cyclophosphamide (CYTOXAN) 1,200 mg in sodium chloride 0.9 % 250 mL IVPB, 600 mg/m2 = 1,200 mg, Intravenous, Once, 4 of 4 cycles  Administration: 1,200 mg (11/4/2019), 1,200 mg (11/22/2019), 1,200 mg (12/16/2019), 1,200 mg (1/10/2020)  DOCEtaxel (TAXOTERE) 150 mg in sodium chloride 0.9 % 250 mL chemo infusion, 75 mg/m2 = 150 mg, Intravenous, Once, 4 of 4 cycles  Administration: 150 mg (11/4/2019), 150 mg (11/22/2019), 150 mg (12/16/2019), 150 mg (1/10/2020)       2/11/2020 - 3/9/2020 Radiation    Course: C1    Plan ID Energy Fractions Dose per Fraction (cGy) Dose Correction (cGy) Total Dose Delivered (cGy) Elapsed Days   BH L BREAST 6X 15 / 15 267 0 4,005 20   L BRST BOOST 9E 5 / 5 200 0 1,000 6      Treatment dates:  C1: 2/11/2020 - 3/9/2020       3/2020 -  Hormone Therapy    Tamoxifen 20 mg PO daily             History of Present Illness: The patient returns to the office today in follow-up for her history of a stage IA left breast cancer. This was ER/VT+ Her2 - and found to be high risk on Mammaprint testing. She is currently LAVON at 4 years following breast conservation therapy with adjuvant chemotherapy and radiation. She has been maintained on Tamoxifen, but is considering undergoing IVF and may be stopping the hormone therapy early. She is concerned about whether or not pursuing IVF will cause her cancer to come back, and is seeking an opinion on this today. She denies any new lumps or masses, skin changes or tenderness. She denies any new headaches, dizziness, or bone or back pain. Her next mammogram is already scheduled for next month. Review of Systems   Constitutional: Negative for activity change, appetite change, fatigue and unexpected weight change. HENT: Negative. Respiratory: Negative. Negative for cough and shortness of breath. Cardiovascular: Negative. Gastrointestinal: Negative.   Negative for abdominal pain, nausea and vomiting. Musculoskeletal: Negative. Skin: Negative. Negative for color change. Neurological: Negative. Negative for dizziness and headaches. Hematological: Negative. Negative for adenopathy. Psychiatric/Behavioral: Negative. Patient Active Problem List   Diagnosis   • Positive GBS test   • Malignant neoplasm of upper-outer quadrant of left breast in female, estrogen receptor positive    • Use of tamoxifen (Nolvadex)   • Obstructive sleep apnea   • Sleep-disordered breathing     Past Medical History:   Diagnosis Date   • Abnormal Pap smear of cervix    • BRCA gene mutation negative 2019    [employer]   • BRCA1 negative    • BRCA2 negative    • Forceps delivery with baby delivered 2019   • History of chemotherapy 2019   • HPV (human papilloma virus) infection    • Papanicolaou smear 2017   • Postpartum depression     As per patient's spouse "it got severe"   • Previous  labor affecting pregnancy, antepartum 04/10/2019   • Primary breast malignancy, left ([address]) 2019   • Rh negative state in antepartum period    • Varicella     Vaccinated     Past Surgical History:   Procedure Laterality Date   • BLEPHAROPLASTY Bilateral 2021    Procedure: UPPER BLEPHAROPLASTY WITH LATERAL TEMPORAL BROWLIFT;  Surgeon: Shahbaz Galindo MD;  Location: [facility];  Service: Plastics   • BREAST BIOPSY Left 2019    us bx   • BREAST LUMPECTOMY Left 2019    Procedure: BREAST NEEDLE LOCALIZED LUMPECTOMY (NEEDLE LOC AT 0930); Surgeon: Gisel Torres MD;  Location[facility];  Service: Surgical Oncology   • COLPOSCOPY     • LYMPH NODE BIOPSY Left 2019    Procedure: SENTINEL LYMPH NODE BIOPSY; LYMPHATIC MAPPING WITH BLUE DYE AND RADIOACTIVE DYE (INJECT AT 1130 BY DR COLEMAN IN THE OR);   Surgeon: Gisel Torres MD;  Location[facility];  Service: Surgical Oncology   • MAMMO NEEDLE LOCALIZATION LEFT (ALL INC) Left 2019   • WI EXC SKIN BENIG >4 CM TRUNK,ARM,LEG Right 4/27/2021    Procedure: CHEST LESION EXCISION;  Surgeon: Rakesh Salinas MD;  Location: [facility];  Service: Plastics   • US GUIDED BREAST BIOPSY LEFT COMPLETE Left 8/13/2019   • US GUIDED BREAST LYMPH NODE BIOPSY LEFT Left 8/13/2019     Family History   Problem Relation Age of Onset   • Stomach cancer Mother 37   • Diabetes Maternal Grandmother    • No Known Problems Father    • No Known Problems Sister    • Cancer Other      Social History     Socioeconomic History   • Marital status: /Civil Union     Spouse name: Not on file   • Number of children: Not on file   • Years of education: Not on file   • Highest education level: Not on file   Occupational History   • Not on file   Tobacco Use   • Smoking status: Former   • Smokeless tobacco: Never   • Tobacco comments:     only social smoking many years ago   Vaping Use   • Vaping Use: Never used   Substance and Sexual Activity   • Alcohol use: Not Currently   • Drug use: No   • Sexual activity: Yes   Other Topics Concern   • Not on file   Social History Narrative   • Not on file     Social Determinants of Health     Financial Resource Strain: Not on file   Food Insecurity: Not on file   Transportation Needs: Not on file   Physical Activity: Not on file   Stress: Not on file   Social Connections: Not on file   Intimate Partner Violence: Not on file   Housing Stability: Not on file       Current Outpatient Medications:   •  acetaminophen-codeine (TYLENOL #3) 300-30 mg per tablet, Take 1 tablet by mouth every 4 (four) hours as needed for moderate pain for up to 10 doses, Disp: 10 tablet, Rfl: 0  •  Cholecalciferol (Vitamin D-3) 25 MCG (1000 UT) CAPS, Take 1 capsule by mouth daily, Disp: , Rfl:   •  FOLIC ACID PO, Take 1 tablet by mouth daily, Disp: , Rfl:   •  Multiple Vitamins-Minerals (MULTIVITAMIN WOMEN) TABS, Take 1 tablet by mouth daily, Disp: , Rfl:   •  Prenatal Vit-Fe Fumarate-FA (PRENATAL PO), Take 1 tablet by mouth daily, Disp: , Rfl:   • Pyridoxine HCl (VITAMIN B-6) 25 MG tablet, Take by mouth, Disp: , Rfl:   •  tamoxifen (NOLVADEX) 20 mg tablet, Take 1 tablet (20 mg total) by mouth daily, Disp: 90 tablet, Rfl: 1  Allergies   Allergen Reactions   • Bee Venom      Vitals:    10/10/23 1213   BP: 124/82   Pulse: 81   Resp: 16   Temp: 97.9 °F (36.6 °C)   SpO2: 98%       Physical Exam  Vitals reviewed. Constitutional:       General: She is not in acute distress. Appearance: Normal appearance. She is normal weight. She is not ill-appearing or toxic-appearing. HENT:      Head: Normocephalic and atraumatic. Nose: Nose normal.      Mouth/Throat:      Mouth: Mucous membranes are moist.   Eyes:      General: No scleral icterus. Conjunctiva/sclera: Conjunctivae normal.   Cardiovascular:      Rate and Rhythm: Normal rate. Pulmonary:      Effort: Pulmonary effort is normal.   Chest:   Breasts:     Right: Normal.      Left: No inverted nipple, mass, nipple discharge, skin change or tenderness. Comments: Right breast is smooth and even. Left breast has stable scarring at prior surgical site. There are no dominant masses, nodules, skin changes or tenderness. I do not appreciate any adenopathy. Musculoskeletal:         General: Normal range of motion. Cervical back: Normal range of motion and neck supple. Lymphadenopathy:      Cervical: No cervical adenopathy. Upper Body:      Right upper body: No supraclavicular, axillary or pectoral adenopathy. Left upper body: No supraclavicular, axillary or pectoral adenopathy. Skin:     General: Skin is warm and dry. Neurological:      General: No focal deficit present. Mental Status: She is alert and oriented to person, place, and time. Psychiatric:         Mood and Affect: Mood normal.         Behavior: Behavior normal.         Thought Content: Thought content normal.         Judgment: Judgment normal.           Discussion/Summary:   This is a 38 y/o female who presents today for continued breast cancer surveillance. At this time there is no evidence of disease by physical exam or symptoms. I will see her again in 6 months for another clinical exam, or sooner should the need arise. With regard to her IVF concerns, I have reviewed her pathology and molecular testing. She has already discussed these concerns with her other providers, and I have recommended she strongly consider their recommendations. In addition, I have explained that she needs to weigh the risks versus benefits for herself. All questions were answered today. A total of 20 minutes were spent in face-to-face patient care.

## 2023-10-10 NOTE — PROGRESS NOTES
Hematology Outpatient Follow - Up Note  Radha Erazo 37 y.o. 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 daily no evidence of disease    She has cryopreserved eggs, thinking of pregnancy with her child    With pregnancy there is limited data however there is an increased risk of recurrence of breast cancer especially with the ER and RI highly positive breast cancer    Patient will think about getting pregnant    She had stopped tamoxifen in July 2023    If she is not wanting to get pregnant she will be back on tamoxifen for at least a total of 5 years        Labs and imaging studies are reviewed by ordering provider once results are available. If there are findings that need immediate attention, you will be contacted when results available. Discussing results and the implication on your healthcare is best discussed in person at your follow-up visit. HPI:    She was at age 18-year-old premenopausal woman who noticed left breast lump 5 months ago. Fernandez Henry brought to medical attention.  When she was found her lump, she was pregnant. Fernandez Henry was found to have radiographic abnormality in her left breast which was biopsied in August 13, 2019 which showed invasive ductal carcinoma, grade 2.  This was ER 90 % positive, RI 60% positive, HER2 negative disease.  Subsequently, she delivered baby few weeks after her biopsy.  She was seen by Dr. Russell Cardenas.      She was negative for BRCA gene mutation.      Status post  left lumpectomy in September 24, 2019 which showed 19 mm of invasive ductal carcinoma, grade 2. 3 sentinel lymph nodes were all negative for metastatic disease.  Dr. Sunshine Almazan sent her tumor tissue for MammaPrint which came back as high risk, luminal B type    Status post adjuvant chemotherapy utilizing Taxotere/cyclophosphamide completed in January 2020, since March 2020 had been on adjuvant tamoxifen 20 mg p.o. daily with normal menses she has cryopreserved eggs  Interval History:    She is thinking of getting pregnant, she stopped tamoxifen in June 2023    Previous Treatment:         Test Results:    Imaging: No results found. Labs:   Lab Results   Component Value Date    WBC 4.90 04/24/2021    HGB 13.9 04/24/2021    HCT 41.4 (L) 04/24/2021    MCV 90 04/24/2021     04/24/2021     Lab Results   Component Value Date    K 3.9 04/24/2021     04/24/2021    CO2 28 04/24/2021    BUN 19 04/24/2021    CREATININE 0.90 04/24/2021    GLUF 86 04/24/2021    CALCIUM 9.0 04/24/2021    AST 18 01/08/2020    ALT 26 01/08/2020    ALKPHOS 51 01/08/2020    EGFR 79 04/24/2021       No results found for: "IRON", "TIBC", "FERRITIN"    No results found for: "Gaila Nam"      ROS: Review of Systems   Constitutional: Negative for appetite change, chills, diaphoresis, fatigue and unexpected weight change. HENT:   Negative for mouth sores, nosebleeds, sore throat, trouble swallowing and voice change. Eyes: Negative for eye problems and icterus. Respiratory: Negative for chest tightness, cough, hemoptysis and wheezing. Cardiovascular: Negative for chest pain, leg swelling and palpitations. Gastrointestinal: Negative for abdominal distention, abdominal pain, blood in stool, constipation, diarrhea, nausea and vomiting. Endocrine: Negative for hot flashes. Genitourinary: Negative for bladder incontinence, difficulty urinating, dyspareunia, dysuria and frequency. Musculoskeletal: Negative for arthralgias, back pain, gait problem, neck pain and neck stiffness.    Skin: Negative for itching and rash. Neurological: Negative for dizziness, gait problem, headaches, numbness, seizures and speech difficulty. Hematological: Negative for adenopathy. Does not bruise/bleed easily. Psychiatric/Behavioral: Negative for decreased concentration, depression, sleep disturbance and suicidal ideas. The patient is not nervous/anxious. Current Medications: Reviewed  Allergies: Reviewed  PMH/FH/SH:  Reviewed      Physical Exam:    Body surface area is 2.07 meters squared. Wt Readings from Last 3 Encounters:   10/10/23 86.6 kg (191 lb)   10/10/23 87 kg (191 lb 12.8 oz)   22 84.8 kg (187 lb)        Temp Readings from Last 3 Encounters:   10/10/23 97.9 °F (36.6 °C)   10/10/23 97.9 °F (36.6 °C) (Temporal)   10/28/22 97.5 °F (36.4 °C) (Temporal)        BP Readings from Last 3 Encounters:   10/10/23 124/82   10/10/23 124/82   22 110/82         Pulse Readings from Last 3 Encounters:   10/10/23 81   10/10/23 81   10/28/22 72        Physical Exam  Constitutional:       Appearance: Normal appearance. She is normal weight. Musculoskeletal:      Cervical back: Normal range of motion and neck supple. Neurological:      General: No focal deficit present. Mental Status: She is alert and oriented to person, place, and time. Psychiatric:         Mood and Affect: Mood normal.         Behavior: Behavior normal.         Thought Content: Thought content normal.         Judgment: Judgment normal.         ECO  Goals and Barriers:  Current Goal: Minimize effects of disease. Barriers: None. Patient's Capacity to Self Care:  Patient is able to self care.     Code Status: @[facility]@

## 2023-10-17 ENCOUNTER — TELEPHONE (OUTPATIENT)
Dept: HEMATOLOGY ONCOLOGY | Facility: CLINIC | Age: 43
End: 2023-10-17

## 2023-10-17 NOTE — TELEPHONE ENCOUNTER
Patient Call    Who are you speaking with? Spouse    If it is not the patient, are they listed on an active communication consent form? Yes   What is the reason for this call? Patient's  states that the [facility] called and said they dont think its a good idea to do it, but when they had their appt with jessenia he said that it would be okay    Does this require a call back? Yes   If a call back is required, please list best call back number [phone]   If a call back is required, advise that a message will be forwarded to their care team and someone will return their call as soon as possible. Did you relay this information to the patient?  Yes

## 2023-10-18 ENCOUNTER — TELEPHONE (OUTPATIENT)
Dept: HEMATOLOGY ONCOLOGY | Facility: CLINIC | Age: 43
End: 2023-10-18

## 2023-10-18 NOTE — TELEPHONE ENCOUNTER
Call Transfer   Who are you speaking with? Spouse   If it is not the patient, are they listed on an active communication consent form? Yes   Who is the patients HemOnc/SurgOnc provider? Dr. Ernie Plascencia   What is the reason for this call? Discuss in-vitro fertilization   Person/Department that the call was transferred to? Time that call was transferred? NATALYA Mallory 4:20pm 10/18   Your call will be transferred now. If you receive a voicemail, please leave a detailed message and a member of the team will return your call as soon as possible. Did you relay this information to the caller?  yes

## 2023-10-19 ENCOUNTER — TELEPHONE (OUTPATIENT)
Dept: SURGICAL ONCOLOGY | Facility: CLINIC | Age: 43
End: 2023-10-19

## 2023-10-19 NOTE — TELEPHONE ENCOUNTER
The patient and her  had questions about undergoing in vitro fertilization. I had not recommended on her forearm since she had a luminal B cancer which tend to come back in years 3-8. Previous reviews the literature as well as attending conferences on pregnancy and breast cancer have been conflicting data over the last several decades with some studies suggesting a benefit and others a detriment. I do not think we will resolve this issue. However unopposed estrogen for a luminal B cancer could potentially be problematic. Sometimes these cancers tend to be more aggressive when they do recur. She is already undergone chemotherapy. We had a long conversation about this. I said for them it is a personal decision I understand the benefit of having an additional child but there are risk associated with that as well. All questions were answered the patient's satisfaction they were both appreciative of the phone call.

## 2023-10-24 DIAGNOSIS — C50.412 MALIGNANT NEOPLASM OF UPPER-OUTER QUADRANT OF LEFT BREAST IN FEMALE, ESTROGEN RECEPTOR POSITIVE: ICD-10-CM

## 2023-10-24 DIAGNOSIS — Z17.0 MALIGNANT NEOPLASM OF UPPER-OUTER QUADRANT OF LEFT BREAST IN FEMALE, ESTROGEN RECEPTOR POSITIVE: ICD-10-CM

## 2023-10-24 RX ORDER — TAMOXIFEN CITRATE 20 MG/1
20 TABLET ORAL DAILY
Qty: 90 TABLET | Refills: 2 | Status: SHIPPED | OUTPATIENT
Start: 2023-10-24

## 2023-10-30 ENCOUNTER — TELEPHONE (OUTPATIENT)
Dept: HEMATOLOGY ONCOLOGY | Facility: CLINIC | Age: 43
End: 2023-10-30

## 2023-10-30 NOTE — TELEPHONE ENCOUNTER
Hello, this is [employer],  of [place]. Karen's date of birth is [birth year]. I am calling again to I'm hoping to get a call back in regards to a letter sent to [facility], [place] so that we can get it rectified. My wife I've spoken to Doctor Margo Lombard in regards to giving us the permission to go forward with industrial fertilization with they still have the old one that says that he does not want us to have any industrial fertilization form and I was a little confused but here I am again. So I was wondering if that information could be related to the clinic and if I could get a call back confirming that it was done. My phone number is area code [phone]. Altogether at [phone]. Thank you.

## 2023-10-31 ENCOUNTER — TELEPHONE (OUTPATIENT)
Dept: HEMATOLOGY ONCOLOGY | Facility: CLINIC | Age: 43
End: 2023-10-31

## 2023-10-31 NOTE — TELEPHONE ENCOUNTER
Patient Call    Who are you speaking with? Spouse    If it is not the patient, are they listed on an active communication consent form? Yes   What is the reason for this call? He wanted to speak to  or a nurse   Does this require a call back? Yes   If a call back is required, please list best call back number [phone]   If a call back is required, advise that a message will be forwarded to their care team and someone will return their call as soon as possible. Did you relay this information to the patient?  Yes

## 2023-10-31 NOTE — TELEPHONE ENCOUNTER
Called patient to clarify her decision to move forward with IVF treatments. Patient does want to proceed with these treatments and wants Dr. Lee Perez to change his medical clearance form to state that she is medically cleared. I tried to explain that although Dr. Lee Perez supports her decision to get pregnant again if desired, he cannot medically clear her as the IVF treatments put her at a high risk of cancer reoccurrence. Patient dissatisfied with this answer. I offered an in person visit with Dr. Lee Perez for both patient and spouse to review the rationale behind Dr. Nawaf Pradhan, or an appointment with another oncologist. Patient refused both at this time. Patient asked that we contact her  to discuss further. Dr. Lee Perez will call patient's spouse.

## 2023-11-01 ENCOUNTER — OFFICE VISIT (OUTPATIENT)
Dept: OBGYN CLINIC | Facility: CLINIC | Age: 43
End: 2023-11-01
Payer: COMMERCIAL

## 2023-11-01 VITALS
SYSTOLIC BLOOD PRESSURE: 137 MMHG | HEART RATE: 83 BPM | DIASTOLIC BLOOD PRESSURE: 82 MMHG | WEIGHT: 192.6 LBS | HEIGHT: 71 IN | BODY MASS INDEX: 26.96 KG/M2 | TEMPERATURE: 98.5 F

## 2023-11-01 DIAGNOSIS — G89.29 CHRONIC PAIN OF LEFT KNEE: ICD-10-CM

## 2023-11-01 DIAGNOSIS — S76.312A HAMSTRING STRAIN, LEFT, INITIAL ENCOUNTER: Primary | ICD-10-CM

## 2023-11-01 DIAGNOSIS — M25.562 CHRONIC PAIN OF LEFT KNEE: ICD-10-CM

## 2023-11-01 PROCEDURE — 99204 OFFICE O/P NEW MOD 45 MIN: CPT | Performed by: FAMILY MEDICINE

## 2023-11-01 NOTE — LETTER
November 2, 2023     Darlyn Mari MD    Patient: Radha Erazo   YOB: 1980   Date of Visit: 11/1/2023       Dear Dr. Madeline Anderson: Thank you for referring Radha Erazo to me for evaluation. Below are my notes for this consultation. If you have questions, please do not hesitate to call me. I look forward to following your patient along with you. Sincerely,        Bard Prabhjot MD        CC: No Recipients    Bard Prabhjot MD  11/1/2023 10:51 AM  Signed  [address] [address] [address]  [phone]  [phone]      Assessment:  1. Hamstring strain, left, initial encounter  -     XR knee 4+ vw left injury; Future; Expected date: 11/01/2023  -     Ambulatory Referral to Physical Therapy; Future    2. Chronic pain of left knee  -     XR knee 4+ vw left injury; Future; Expected date: 11/01/2023  -     Ambulatory Referral to Physical Therapy; Future        Plan:  Patient Instructions   F/u 6 wks  Begin therapy  Icing/heat/OTC pain meds as needed. Home exercises. Return in about 6 weeks (around 12/13/2023) for Recheck. Chief Complaint:  Chief Complaint   Patient presents with    Left Leg - Pain     Back of left upper leg       Subjective:   HPI    Patient ID: Radha Erazo is a 37 y.o. female     Here c/o L leg pain  Pain in posterior leg  Started working out in gym about 2 months ago  Working out in [employer] [facility]  No pain walking  Pain bending L knee/squatting  Discomfort  No pain meds    Review of Systems   Constitutional:  Negative for fatigue and fever. Respiratory:  Negative for shortness of breath. Cardiovascular:  Negative for chest pain. Gastrointestinal:  Negative for abdominal pain and nausea. Genitourinary:  Negative for dysuria. Musculoskeletal:  Positive for arthralgias. Skin:  Negative for rash and wound. Neurological:  Negative for weakness and headaches.        Objective:  Vitals:  BP 137/82 (BP Location: Left arm, Patient Position: Sitting, Cuff Size: Standard)   Pulse 83   Temp 98.5 °F (36.9 °C) (Tympanic)   Ht 5' 11" (1.803 m)   Wt 87.4 kg (192 lb 9.6 oz)   BMI 26.86 kg/m²     The following portions of the patient's history were reviewed and updated as appropriate: allergies, current medications, past family history, past medical history, past social history, past surgical history, and problem list.    Physical exam:  Physical Exam  Constitutional:       Appearance: Normal appearance. She is normal weight. Eyes:      Extraocular Movements: Extraocular movements intact. Pulmonary:      Effort: Pulmonary effort is normal.   Musculoskeletal:         General: Tenderness present. Cervical back: Normal range of motion. Comments: L lateral hamstring TTP  Pain with resistance to knee flexion   Skin:     General: Skin is warm and dry. Neurological:      General: No focal deficit present. Mental Status: She is alert and oriented to person, place, and time. Mental status is at baseline. Psychiatric:         Mood and Affect: Mood normal.         Behavior: Behavior normal.         Thought Content: Thought content normal.         Judgment: Judgment normal.       Ortho Exam    I have personally reviewed pertinent films in PACS and my interpretation is XR-L knee-  nml study. Janell Keller MD

## 2023-11-01 NOTE — PROGRESS NOTES
[facility] [address] [address] [phone]      Assessment:  1. Hamstring strain, left, initial encounter  -     XR knee 4+ vw left injury; Future; Expected date: 11/01/2023  -     Ambulatory Referral to Physical Therapy; Future    2. Chronic pain of left knee  -     XR knee 4+ vw left injury; Future; Expected date: 11/01/2023  -     Ambulatory Referral to Physical Therapy; Future        Plan:  Patient Instructions   F/u 6 wks  Begin therapy  Icing/heat/OTC pain meds as needed. Home exercises. Return in about 6 weeks (around 12/13/2023) for Recheck. Chief Complaint:  Chief Complaint   Patient presents with    Left Leg - Pain     Back of left upper leg       Subjective:   HPI    Patient ID: Naomi Castellon is a 37 y.o. female     Here c/o L leg pain  Pain in posterior leg  Started working out in gym about 2 months ago  Working out in [employer] [facility]  No pain walking  Pain bending L knee/squatting  Discomfort  No pain meds    Review of Systems   Constitutional:  Negative for fatigue and fever. Respiratory:  Negative for shortness of breath. Cardiovascular:  Negative for chest pain. Gastrointestinal:  Negative for abdominal pain and nausea. Genitourinary:  Negative for dysuria. Musculoskeletal:  Positive for arthralgias. Skin:  Negative for rash and wound. Neurological:  Negative for weakness and headaches.        Objective:  Vitals:  /82 (BP Location: Left arm, Patient Position: Sitting, Cuff Size: Standard)   Pulse 83   Temp 98.5 °F (36.9 °C) (Tympanic)   Ht 5' 11" (1.803 m)   Wt 87.4 kg (192 lb 9.6 oz)   BMI 26.86 kg/m²     The following portions of the patient's history were reviewed and updated as appropriate: allergies, current medications, past family history, past medical history, past social history, past surgical history, and problem list.    Physical exam:  Physical Exam  Constitutional: Appearance: Normal appearance. She is normal weight. Eyes:      Extraocular Movements: Extraocular movements intact. Pulmonary:      Effort: Pulmonary effort is normal.   Musculoskeletal:         General: Tenderness present. Cervical back: Normal range of motion. Comments: L lateral hamstring TTP  Pain with resistance to knee flexion   Skin:     General: Skin is warm and dry. Neurological:      General: No focal deficit present. Mental Status: She is alert and oriented to person, place, and time. Mental status is at baseline. Psychiatric:         Mood and Affect: Mood normal.         Behavior: Behavior normal.         Thought Content: Thought content normal.         Judgment: Judgment normal.       Ortho Exam    I have personally reviewed pertinent films in PACS and my interpretation is XR-L knee-  nml study. Sixto Chung MD

## 2023-11-07 ENCOUNTER — TELEPHONE (OUTPATIENT)
Dept: HEMATOLOGY ONCOLOGY | Facility: CLINIC | Age: 43
End: 2023-11-07

## 2023-11-07 NOTE — TELEPHONE ENCOUNTER
Patient Call    Who are you speaking with? Spouse    If it is not the patient, are they listed on an active communication consent form? Yes   What is the reason for this call? clearance   Does this require a call back? Yes   If a call back is required, please list best call back number [phone]    If a call back is required, advise that a message will be forwarded to their care team and someone will return their call as soon as possible. Did you relay this information to the patient?  Yes

## 2023-11-08 ENCOUNTER — DOCUMENTATION (OUTPATIENT)
Dept: HEMATOLOGY ONCOLOGY | Facility: CLINIC | Age: 43
End: 2023-11-08

## 2023-11-08 ENCOUNTER — TELEPHONE (OUTPATIENT)
Dept: SURGICAL ONCOLOGY | Facility: CLINIC | Age: 43
End: 2023-11-08

## 2023-11-08 NOTE — PROGRESS NOTES
Received Paperwork   What type of form Other (Enter type in comments)   Scanned blank form into patient's Epic chart Yes   Method received form  Fax   Provider responsible for form Faroun   Informed patient our office turn around time for completing patient forms is 5-7 business days.  NA     Comments Medical Clearance  Please fax back to [phone]: Usman Lee and include all consult notes and testing results

## 2023-11-08 NOTE — TELEPHONE ENCOUNTER
I had a long conversation with her  over the phone, they are both aware that there is an increased risk of potential breast cancer in the future especially with IVF as well as pregnancy in a patient who had previous history of breast cancer    There is no specific data however we believe because of the ER positivity again exposing the patient to IVF as well as pregnancy could increase the risk of breast cancer    He and his wife decided on pursuing IVF and pregnancy

## 2023-11-08 NOTE — TELEPHONE ENCOUNTER
Received Paperwork   What type of form Other (Enter type in comments)   Scanned blank form into patient's Epic chart Yes   Method received form  Fax   Provider responsible for form Dr. Douglas Adams patient   Informed patient our office turn around time for completing patient forms is 5-7 business days.  Yes, informed patient of turn around time     Comments

## 2023-11-09 NOTE — PROGRESS NOTES
PT Evaluation     Today's date: 2023  Patient name: Bishnu Huerta  : 1980  MRN: [id number]  Referring provider: Della Simmonds, MD  Dx:   Encounter Diagnosis     ICD-10-CM    1. Hamstring strain, left, subsequent encounter  S76.312D       2. Chronic pain of left knee  M25.562     G89.29                      Assessment  Assessment details: Bishnu Huerta is a 37 y.o. female with a history of breast CA, Hx chemotherapy, sleep apnea that presents for a moderate complexity physical therapy initial evaluation. The patient demonstrates signs and symptoms consistent with L hamstring strain, chronic L knee pain. During the examination the patient demonstrated decreased L LE strength( hams), activity intolerance( squat), and persistent L posterior/lateral knee pain with tenderness at biceps femoris. The patient's lumbar spine was screened and found to be tight into extension. The patient's impairments are causing the following functional limitations:  difficulty squatting/kneeling, bending over, difficulty stair-climbing, and difficulty quickly transferring from low surfaces. The patient's clinical presentation is evolving due to a number of participation restrictions, significant medial history, and functional limitation (FOTO 49% function). The patient will benefit from skilled PT services focused on normalizing lumbar ROM, strengthening L LE, and reducing L HS pain, work towards goals, and restore PLOF. Impairments: abnormal or restricted ROM, activity intolerance, impaired balance, impaired physical strength, lacks appropriate home exercise program and pain with function  Functional limitations: difficulty squatting/kneeling, bending over, difficulty stair-climbing, and difficulty quickly transferring from low surfaces.   Symptom irritability: moderateUnderstanding of Dx/Px/POC: good   Prognosis: good  Prognosis details: Positive prognostic indicators include: positive attitude toward recovery, good understanding of diagnosis/treatment plan. Negative prognostic indicators include: NONE      Goals  STG: Achieve in 4-6 weeks  1. Patient's L knee pain at worst less than 2/10 to allow for return to the gym  2. Patient's L hip ER ROM 40 deg, lumbar EXT WFL to allow less pain with bending/position changes. 3.  L LE MMT improve to > 4+/5 all motions tested to improve ADL/recreational activities. LTG:  Achieve in 6-12 weeks  1. Patient's hip FOTO score improve to > 70% to indicate a return to normal functioning. 2.  Patient achieve personal goal of returning to gym without restriction without L posterior lateral knee pain. 3. Patient to achieve independence with home exercise plan. Plan  Plan details: RE-ASSESS 1X/MONTH  Patient would benefit from: skilled physical therapy  Planned modality interventions: cryotherapy, thermotherapy: hydrocollator packs, ultrasound and TENS  Planned therapy interventions: joint mobilization, manual therapy, neuromuscular re-education, patient education, postural training, self care, strengthening, stretching, therapeutic activities, therapeutic exercise, home exercise program, abdominal trunk stabilization and IASTM  Frequency: 1-3x/wk. Duration in weeks: 12  Plan of Care beginning date: 11/13/2023  Plan of Care expiration date: 2/12/2024  Treatment plan discussed with: PTA and patient      Subjective Evaluation    History of Present Illness  Date of onset: 9/27/2023  Mechanism of injury: Arpan Dee is a 37 y.o. female that presents to outpatient physical therapy with complaints of L posterior-lateral knee pain. The patient reports onset when exercising with quick lateral movements. The patient notes most pain with forced L knee flexion to end range, squatting, and attempting to perform her usual activities at the gym. The patient denies any numbness/tingling at the L LE.   The patient notes being able to sit for prolonged periods of times without provoking her symptoms. The patient's main goal for physical therapy is to return to the gym without restriction. 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 Tenderness   Left Knee   Tenderness in the popliteal fossa. No tenderness in the ITB, lateral joint line, lateral patella and LCL (distal). Neurological Testing     Sensation     Knee   Left Knee   Intact: Light touch    Right Knee   Intact: light touch     Active Range of Motion   Left Hip   Flexion: WFL  Abduction: WFL  External rotation (90/90): 30 degrees   Internal rotation (90/90): 60 degrees with pain    Right Hip   Flexion: WFL  Abduction: WFL  External rotation (90/90): 40 degrees   Internal rotation (90/90): 50 degrees   Left Knee   Normal active range of motion    Right Knee   Normal active range of motion    Passive Range of Motion   Left Knee   Normal passive range of motion    Right Knee   Normal passive range of motion    Mobility   Patellar Mobility:   Left Knee   WFL: medial, lateral, superior and inferior.      Right Knee   WFL: medial, lateral, superior and inferior    Strength/Myotome Testing     Left Hip   Planes of Motion   Flexion: 4+  Abduction: 4  Adduction: 4    Right Hip   Planes of Motion   Flexion: 4+  Abduction: 4  Adduction: 4    Left Knee   Flexion: 4- (pain)  Extension: 4 (pain)  Quadriceps contraction: fair    Right Knee   Flexion: 5  Extension: 5  Quadriceps contraction: good    Tests     Lumbar     Left   Negative slump test.     Right   Negative slump test.     Left Hip   SLR: Negative. Right Hip   SLR: Negative. Additional Tests Details  (+) reproduction of symptoms with resisted knee flexion in supine and prone indicating biceps femoris strain/pain versus hamstring syndrome w/ sciatic involvement. Repeated lumbar EXT in standing and prone did not abolish symptoms at posterior L knee - patient had pain with squatting afterwards when testing. Lumbar involvement could not be totally ruled out so the patient was given repeated EXT exercises. Re-evaluation: 12/11   BASELINE/TEST: Standing Squat p!p!   Hip Specialty Daily Treatment Diary   Access Code: [email or web address]  - INSTRUCTIONS IN ESPANOL    Manual  11/13       STM/MFR/ IASTM / L lateral HS(biceps femoris)  *      L hip ER stretch X15 repeated   No change                       Knee flexion with distraction on edge of mat *test           Therapeutic Exercise 11/13       Recumbent Bike for Hip ROM S=        Nu step for LE strength/ endurance S=        TM walk *                       Heel Slide flex/abd AROM       Supine ball squeeze *       LAQ        Bridges *       SLR EXT                               Prone press ups 2x10       Standing lumbar extensions 1x10       Total Gym Squat        TB hip IR, ER *ER                       Sidestepping        Hurdles OBO                                Neuro Re-Ed        [employer]       Core brace  *      Glute set x20       SLB  *      Clam shells  *      CSMi "+" squat,         Posture education/towel roll Done AD       Hitesh *      Foam balance        Fitter balance        Therapeutic Activity        Reverse lunges        Squats P! p! Step Ups fwd/side                    Modalities        US L posterior/lateral thigh   *? If no improvement                            The patient was given a new home exercise plan with instruction, pictures, and verbal feedback. The patient accepts and understands the new home activities.

## 2023-11-13 ENCOUNTER — EVALUATION (OUTPATIENT)
Dept: PHYSICAL THERAPY | Facility: CLINIC | Age: 43
End: 2023-11-13
Payer: COMMERCIAL

## 2023-11-13 DIAGNOSIS — M25.562 CHRONIC PAIN OF LEFT KNEE: ICD-10-CM

## 2023-11-13 DIAGNOSIS — G89.29 CHRONIC PAIN OF LEFT KNEE: ICD-10-CM

## 2023-11-13 DIAGNOSIS — S76.312D HAMSTRING STRAIN, LEFT, SUBSEQUENT ENCOUNTER: Primary | ICD-10-CM

## 2023-11-13 DIAGNOSIS — S76.312A HAMSTRING STRAIN, LEFT, INITIAL ENCOUNTER: ICD-10-CM

## 2023-11-13 PROCEDURE — 97162 PT EVAL MOD COMPLEX 30 MIN: CPT | Performed by: PHYSICAL THERAPIST

## 2023-11-13 PROCEDURE — 97112 NEUROMUSCULAR REEDUCATION: CPT | Performed by: PHYSICAL THERAPIST

## 2023-11-13 PROCEDURE — 97110 THERAPEUTIC EXERCISES: CPT | Performed by: PHYSICAL THERAPIST

## 2023-11-16 NOTE — PROGRESS NOTES
Daily Note     Today's date: 2023  Patient name: Margo Reyes  : 1980  MRN: [id number]  Referring provider: Audra Prince MD  Dx:   Encounter Diagnosis     ICD-10-CM    1. Hamstring strain, left, subsequent encounter  S76.312D       2. Chronic pain of left knee  M25.562     G89.29       3. Hamstring strain, left, initial encounter  S76.312A                      Subjective: The patient reports having increased midline lumbar pain after doing the extension exercises. The patient notes she is having a significant reduction in L posterior thigh      Objective: See treatment diary below      Assessment: We discussed at length the performance of exercises, the potential pathology involved with pain referral from the spine, posture implications, and movement precautions. Thepatient was unable to tolerate performing the prone press ups but is able to do standing lumbar extensions. The patient notes a reduction in her L HS pain and improved ability to squat without pain and go on the elliptical.  The patient will benefit from continued PT to achieve her goals of therapy. Plan: Continue per plan of care. Progress treatment as tolerated. Re-evaluation:    BASELINE/TEST: Standing Squat p!p! Hip Specialty Daily Treatment Diary   Access Code: [email or web address]  - INSTRUCTIONS IN ESPANOL    Manual        STM/MFR/ IASTM / L lateral HS(biceps femoris)  *      L hip ER stretch X15 repeated   No change                       Knee flexion with distraction on edge of mat *test           Therapeutic Exercise       Recumbent Bike for Hip ROM S=        Nu step for LE strength/ endurance S=        TM walk *                       Heel Slide flex/abd AROM       Supine ball squeeze *       LAQ        Bridges *       SLR EXT                               Prone press ups 2x10 X1p!p!  unable      Standing lumbar extensions 1x10 1x5      Total Gym Squat        TB hip IR, ER *ER Sidestepping        Hurdles OBO                                Neuro Re-Ed        [employer] reviewed      Core brace  *      Glute set x20 reviewed      SLB  *      Clam shells  *      CSMi "+" squat,         Posture education/towel roll Done AD Discussed at length the nature of symptoms, the exercises, expected course of recovery, posture implications and HEP x 15 mins      Kegels  *      Foam balance        Seated posture corrections  x10      Therapeutic Activity        Reverse lunges        Squats P! p! Step Ups fwd/side                    Modalities        US L posterior/lateral thigh   *?  If no improvement

## 2023-11-17 ENCOUNTER — OFFICE VISIT (OUTPATIENT)
Dept: PHYSICAL THERAPY | Facility: CLINIC | Age: 43
End: 2023-11-17
Payer: COMMERCIAL

## 2023-11-17 DIAGNOSIS — M25.562 CHRONIC PAIN OF LEFT KNEE: ICD-10-CM

## 2023-11-17 DIAGNOSIS — S76.312D HAMSTRING STRAIN, LEFT, SUBSEQUENT ENCOUNTER: Primary | ICD-10-CM

## 2023-11-17 DIAGNOSIS — G89.29 CHRONIC PAIN OF LEFT KNEE: ICD-10-CM

## 2023-11-17 DIAGNOSIS — S76.312A HAMSTRING STRAIN, LEFT, INITIAL ENCOUNTER: ICD-10-CM

## 2023-11-17 PROCEDURE — 97110 THERAPEUTIC EXERCISES: CPT | Performed by: PHYSICAL THERAPIST

## 2023-11-17 PROCEDURE — 97112 NEUROMUSCULAR REEDUCATION: CPT | Performed by: PHYSICAL THERAPIST

## 2023-11-20 ENCOUNTER — APPOINTMENT (OUTPATIENT)
Dept: PHYSICAL THERAPY | Facility: CLINIC | Age: 43
End: 2023-11-20
Payer: COMMERCIAL

## 2023-11-21 ENCOUNTER — TELEPHONE (OUTPATIENT)
Dept: HEMATOLOGY ONCOLOGY | Facility: CLINIC | Age: 43
End: 2023-11-21

## 2023-11-21 NOTE — TELEPHONE ENCOUNTER
Patient Call    Who are you speaking with? Spouse Seanradhadelmis   If it is not the patient, are they listed on an active communication consent form? Yes   What is the reason for this call? Hiren calling to speak directly with Dr. Yamilex Cortés. Patient and spouse are looking into doing IVF and have some questions in regards to this. Does this require a call back? Yes   If a call back is required, please list best call back number [phone]   If a call back is required, advise that a message will be forwarded to their care team and someone will return their call as soon as possible. Did you relay this information to the patient?  Yes

## 2023-11-22 NOTE — TELEPHONE ENCOUNTER
Patient Call    Who are you speaking with? Patient and Spouse    If it is not the patient, are they listed on an active communication consent form? Yes   What is the reason for this call? Hiren called in to see if Dr. Syed Marino received the message, I did inform him that Dr. Syed Marino is on vacation right now and he will be back in the office 11/27. He verbalized his understanding and thanked m. Does this require a call back? No   If a call back is required, please list best call back number N/A   If a call back is required, advise that a message will be forwarded to their care team and someone will return their call as soon as possible. Did you relay this information to the patient?  No

## 2023-11-28 ENCOUNTER — TELEPHONE (OUTPATIENT)
Dept: HEMATOLOGY ONCOLOGY | Facility: CLINIC | Age: 43
End: 2023-11-28

## 2023-11-28 NOTE — TELEPHONE ENCOUNTER
Call Transfer   Who are you speaking with? Spouse   If it is not the patient, are they listed on an active communication consent form? Yes   Who is the patients HemOnc/SurgOnc provider? Dr. Stephanie Kline   What is the reason for this call? Hiren is calling to speak to Dr. Yesenia Chacko team.    Person/Department that the call was transferred to? Time that call was transferred? Constanza machuca @3:25PM    Your call will be transferred now. If you receive a voicemail, please leave a detailed message and a member of the team will return your call as soon as possible. Did you relay this information to the caller?   Yes

## 2023-11-29 ENCOUNTER — TELEPHONE (OUTPATIENT)
Dept: HEMATOLOGY ONCOLOGY | Facility: CLINIC | Age: 43
End: 2023-11-29

## 2023-11-29 ENCOUNTER — TELEPHONE (OUTPATIENT)
Dept: SURGICAL ONCOLOGY | Facility: CLINIC | Age: 43
End: 2023-11-29

## 2023-11-29 ENCOUNTER — DOCUMENTATION (OUTPATIENT)
Dept: SURGICAL ONCOLOGY | Facility: CLINIC | Age: 43
End: 2023-11-29

## 2023-11-29 ENCOUNTER — HOSPITAL ENCOUNTER (OUTPATIENT)
Dept: MAMMOGRAPHY | Facility: CLINIC | Age: 43
Discharge: HOME | End: 2023-11-29
Payer: COMMERCIAL

## 2023-11-29 VITALS — HEIGHT: 71 IN | WEIGHT: 192 LBS | BODY MASS INDEX: 26.88 KG/M2

## 2023-11-29 DIAGNOSIS — Z85.3 PERSONAL HISTORY OF BREAST CANCER: ICD-10-CM

## 2023-11-29 PROCEDURE — 77066 DX MAMMO INCL CAD BI: CPT

## 2023-11-29 PROCEDURE — G0279 TOMOSYNTHESIS, MAMMO: HCPCS

## 2023-11-29 NOTE — TELEPHONE ENCOUNTER
Patient Call    Who are you speaking with? Spouse    If it is not the patient, are they listed on an active communication consent form? Yes   What is the reason for this call? Patients  calling in requesting a call back from Dr. Alvarado Car and would not leave a message as to what in regards to   Does this require a call back? Yes   If a call back is required, please list best call back number [phone]   If a call back is required, advise that a message will be forwarded to their care team and someone will return their call as soon as possible. Did you relay this information to the patient?  Yes

## 2023-11-29 NOTE — PROGRESS NOTES
I spoke with the patient's  at her request to review giving her clearance for in vitro fertilization. I explained that from my perspective they are asking me to clear her of any complications such as cancer in the future. It is not clear to me exactly what they mean by clearance. I perceive this as being a shared decision consenting process where there are risk associated with the process of becoming pregnant luminal B cancer are delineated. When her clearance without associated discussion regarding risk seems inappropriate. Will try to draft a letter in response to the in vitro fertilization clinic but basically defer to her medical oncologist who I think is a better expert in the field who is already given his clearance albeit with some reservation.

## 2023-11-29 NOTE — TELEPHONE ENCOUNTER
Patient Call    Who are you speaking with? Spouse    If it is not the patient, are they listed on an active communication consent form? Yes   What is the reason for this call? He was returning Dr. Roa Heads call. Did not wish to leave vm   Does this require a call back? Yes   If a call back is required, please list best call back number [phone]    If a call back is required, advise that a message will be forwarded to their care team and someone will return their call as soon as possible. Did you relay this information to the patient?  Yes

## 2023-12-06 ENCOUNTER — OFFICE VISIT (OUTPATIENT)
Dept: PODIATRY | Facility: CLINIC | Age: 43
End: 2023-12-06
Payer: COMMERCIAL

## 2023-12-06 VITALS
BODY MASS INDEX: 26.88 KG/M2 | HEART RATE: 63 BPM | WEIGHT: 192 LBS | HEIGHT: 71 IN | DIASTOLIC BLOOD PRESSURE: 85 MMHG | SYSTOLIC BLOOD PRESSURE: 126 MMHG

## 2023-12-06 DIAGNOSIS — L60.0 INGROWING NAIL: ICD-10-CM

## 2023-12-06 DIAGNOSIS — L90.9 PLANTAR FAT PAD ATROPHY: ICD-10-CM

## 2023-12-06 DIAGNOSIS — B35.1 ONYCHOMYCOSIS: ICD-10-CM

## 2023-12-06 DIAGNOSIS — L60.3 NAIL DYSTROPHY: ICD-10-CM

## 2023-12-06 DIAGNOSIS — M79.671 PAIN IN RIGHT FOOT: Primary | ICD-10-CM

## 2023-12-06 DIAGNOSIS — M21.6X9 CAVUS FOOT, ACQUIRED: ICD-10-CM

## 2023-12-06 PROCEDURE — 99203 OFFICE O/P NEW LOW 30 MIN: CPT | Performed by: PODIATRIST

## 2023-12-06 NOTE — PROGRESS NOTES
Assessment/Plan:    No problem-specific Assessment & Plan notes found for this encounter. Diagnoses and all orders for this visit:    Pain in right foot    Cavus foot, acquired    Ingrowing nail    Nail dystrophy    Plantar fat pad atrophy        -Denies a family history of ingrowing toenails  - It appears as though her chemotherapy has given her significant nail dystrophy it is involving the 1, 2 and 5 digits very slight on #2 more impressive on 5 and 1  -Unfortunately none of these options that are available for her are likely going to be something that she would be satisfied with.  -We did discuss temporary versus permanent versus partial versus total removal of these nail plates  - I also did discuss that there is a artificial toenail which is not covered by insurance available  - With her pincher nails, her bilateral hallux nails are somewhat thin in nature, to alleviate her pain discomfort I would recommend a permanent removal of the medial nail border, but this would leave her with a very thin toenail which would not be cosmetically pleasing  - The other option would be for total removal permanently followed by a prosthetic toenail  -I advised her to consider these options in the future as the nails likely progress and deformity and get worse. Subjective:      Patient ID: Avinash Pizarro is a 37 y.o. female. Patient presents for evaluation management of bilateral feet, she had chemotherapy and after finishing chemo she noted her nails changed they are more painful, they are ingrowing, they do not necessarily have degree debrided but they are somewhat more thickened.   She is also complaining of forefoot pain with use of heels      The following portions of the patient's history were reviewed and updated as appropriate: allergies, current medications, past family history, past medical history, past social history, past surgical history, and problem list.    Review of Systems   Constitutional:  Negative for chills and fever. HENT:  Negative for ear pain and sore throat. Eyes:  Negative for pain and visual disturbance. Respiratory:  Negative for cough and shortness of breath. Cardiovascular:  Negative for chest pain and palpitations. Gastrointestinal:  Negative for abdominal pain and vomiting. Genitourinary:  Negative for dysuria and hematuria. Musculoskeletal:  Negative for arthralgias and back pain. Skin:  Negative for color change and rash. Neurological:  Negative for seizures and syncope. All other systems reviewed and are negative. Objective:      /85   Pulse 63   Ht 5' 11" (1.803 m)   Wt 87.1 kg (192 lb)   LMP 11/08/2023 (Approximate)   BMI 26.78 kg/m²          Physical Exam  Vitals reviewed. Constitutional:       Appearance: Normal appearance. HENT:      Head: Normocephalic and atraumatic. Nose: Nose normal.      Mouth/Throat:      Mouth: Mucous membranes are moist.   Musculoskeletal:      Comments: Slight cavus foot type, tripod effect 1 5 and heel    Significant nail dystrophy impinging nail formation bilateral hallux and fifth toe nail dystrophy fifth toe and slight on the second digit. Neurological:      Mental Status: She is alert.

## 2023-12-06 NOTE — LETTER
December 7, 2023     Huber Roth MD    Patient: Lloyd Sosa   YOB: 1980   Date of Visit: 12/6/2023       Dear Dr. Ramon Cunha: Thank you for referring Lloyd Sosa to me for evaluation. Below are my notes for this consultation. If you have questions, please do not hesitate to call me. I look forward to following your patient along with you. Sincerely,        Leny Meade DPM        CC: No Recipients    Heather Goodrich  12/6/2023 11:43 AM  Signed  Assessment/Plan:    No problem-specific Assessment & Plan notes found for this encounter. Diagnoses and all orders for this visit:    Pain in right foot    Cavus foot, acquired    Ingrowing nail    Nail dystrophy    Plantar fat pad atrophy        -Denies a family history of ingrowing toenails  - It appears as though her chemotherapy has given her significant nail dystrophy it is involving the 1, 2 and 5 digits very slight on #2 more impressive on 5 and 1  -Unfortunately none of these options that are available for her are likely going to be something that she would be satisfied with.  -We did discuss temporary versus permanent versus partial versus total removal of these nail plates  - I also did discuss that there is a artificial toenail which is not covered by insurance available  - With her pincher nails, her bilateral hallux nails are somewhat thin in nature, to alleviate her pain discomfort I would recommend a permanent removal of the medial nail border, but this would leave her with a very thin toenail which would not be cosmetically pleasing  - The other option would be for total removal permanently followed by a prosthetic toenail  -I advised her to consider these options in the future as the nails likely progress and deformity and get worse. Subjective:      Patient ID: Lloyd Sosa is a 37 y.o. female.     Patient presents for evaluation management of bilateral feet, she had chemotherapy and after finishing chemo she noted her nails changed they are more painful, they are ingrowing, they do not necessarily have degree debrided but they are somewhat more thickened. She is also complaining of forefoot pain with use of heels      The following portions of the patient's history were reviewed and updated as appropriate: allergies, current medications, past family history, past medical history, past social history, past surgical history, and problem list.    Review of Systems   Constitutional:  Negative for chills and fever. HENT:  Negative for ear pain and sore throat. Eyes:  Negative for pain and visual disturbance. Respiratory:  Negative for cough and shortness of breath. Cardiovascular:  Negative for chest pain and palpitations. Gastrointestinal:  Negative for abdominal pain and vomiting. Genitourinary:  Negative for dysuria and hematuria. Musculoskeletal:  Negative for arthralgias and back pain. Skin:  Negative for color change and rash. Neurological:  Negative for seizures and syncope. All other systems reviewed and are negative. Objective:      /85   Pulse 63   Ht 5' 11" (1.803 m)   Wt 87.1 kg (192 lb)   LMP 11/08/2023 (Approximate)   BMI 26.78 kg/m²          Physical Exam  Vitals reviewed. Constitutional:       Appearance: Normal appearance. HENT:      Head: Normocephalic and atraumatic. Nose: Nose normal.      Mouth/Throat:      Mouth: Mucous membranes are moist.   Musculoskeletal:      Comments: Slight cavus foot type, tripod effect 1 5 and heel    Significant nail dystrophy impinging nail formation bilateral hallux and fifth toe nail dystrophy fifth toe and slight on the second digit. Neurological:      Mental Status: She is alert.

## 2023-12-18 ENCOUNTER — ANNUAL EXAM (OUTPATIENT)
Dept: GYNECOLOGY | Facility: CLINIC | Age: 43
End: 2023-12-18
Payer: COMMERCIAL

## 2023-12-18 VITALS
SYSTOLIC BLOOD PRESSURE: 122 MMHG | HEIGHT: 72 IN | BODY MASS INDEX: 25.47 KG/M2 | WEIGHT: 188 LBS | DIASTOLIC BLOOD PRESSURE: 80 MMHG

## 2023-12-18 DIAGNOSIS — Z01.419 ENCOUNTER FOR GYNECOLOGICAL EXAMINATION WITHOUT ABNORMAL FINDING: ICD-10-CM

## 2023-12-18 DIAGNOSIS — Z12.31 SCREENING MAMMOGRAM FOR BREAST CANCER: Primary | ICD-10-CM

## 2023-12-18 PROCEDURE — S0612 ANNUAL GYNECOLOGICAL EXAMINA: HCPCS | Performed by: OBSTETRICS & GYNECOLOGY

## 2023-12-18 NOTE — PROGRESS NOTES
"Assessment :     Normal breast and GYN exam  Left breast cancer diagnosed 2019.  On tamoxifen  Normal Pap smear 2001  Stop tamoxifen 2023    Plan: Plans IVF in the next few months.  Continue healthy diet and exercise.  Continue follow-up with oncologist    Subjective:       Patient ID: Xiao Jones is a 43 y.o. female resents to the office for annual exam with no complaints.  Finished tamoxifen 2023.  Was cleared by oncology to proceed with IVF.  Her eggs are still in [place].  She spent 6 months in [place] and is now home.  She is seeing reproductive endocrinology and they plan to proceed with her IVF.  Had a hysteroscopy and blood work done not available in epic.  Patient plans to get the results to us.  Eating healthy and exercising regularly.      Review of Systems   Constitutional: Negative.  Negative for fatigue, fever and unexpected weight change.   HENT: Negative.     Eyes: Negative.    Respiratory: Negative.  Negative for chest tightness, shortness of breath, wheezing and stridor.    Cardiovascular: Negative.  Negative for chest pain, palpitations and leg swelling.   Gastrointestinal: Negative.  Negative for abdominal pain, blood in stool, diarrhea, nausea, rectal pain and vomiting.   Endocrine: Negative.    Genitourinary:  Negative for dysuria, frequency, vaginal bleeding, vaginal discharge and vaginal pain.   Musculoskeletal: Negative.    Skin: Negative.    Allergic/Immunologic: Negative.    Neurological: Negative.    Hematological: Negative.    Psychiatric/Behavioral: Negative.     All other systems reviewed and are negative.        Objective:      /80   Ht 5' 11.65\" (1.82 m)   Wt 85.3 kg (188 lb)   LMP 12/10/2023 (Exact Date)   BMI 25.74 kg/m²          Physical Exam  Constitutional:       Appearance: She is well-developed.   HENT:      Head: Normocephalic and atraumatic.   Neck:      Thyroid: No thyromegaly.      Trachea: No tracheal deviation.   Cardiovascular: "      Rate and Rhythm: Normal rate and regular rhythm.      Heart sounds: Normal heart sounds.   Pulmonary:      Effort: Pulmonary effort is normal. No respiratory distress.      Breath sounds: Normal breath sounds. No stridor. No wheezing or rales.   Chest:      Chest wall: No tenderness.   Breasts:     Breasts are symmetrical.      Right: No inverted nipple, mass, nipple discharge, skin change or tenderness.      Left: No inverted nipple, mass, nipple discharge, skin change or tenderness.   Abdominal:      General: Bowel sounds are normal. There is no distension.      Palpations: Abdomen is soft. There is no mass.      Tenderness: There is no abdominal tenderness. There is no guarding or rebound.      Hernia: No hernia is present. There is no hernia in the left inguinal area.   Genitourinary:     Labia:         Right: No rash, tenderness, lesion or injury.         Left: No rash, tenderness, lesion or injury.       Vagina: Normal. No signs of injury and foreign body. No vaginal discharge, erythema, tenderness or bleeding.      Cervix: No cervical motion tenderness, discharge or friability.      Uterus: Not deviated, not enlarged, not fixed and not tender.       Adnexa:         Right: No mass, tenderness or fullness.          Left: No mass, tenderness or fullness.        Rectum: No mass, anal fissure, external hemorrhoid or internal hemorrhoid.   Musculoskeletal:      Cervical back: Normal range of motion and neck supple.   Lymphadenopathy:      Lower Body: No right inguinal adenopathy. No left inguinal adenopathy.   Skin:     General: Skin is warm and dry.   Neurological:      Mental Status: She is alert and oriented to person, place, and time.   Psychiatric:         Behavior: Behavior normal.         Thought Content: Thought content normal.         Judgment: Judgment normal.

## 2024-01-29 DIAGNOSIS — C50.412 MALIGNANT NEOPLASM OF UPPER-OUTER QUADRANT OF LEFT BREAST IN FEMALE, ESTROGEN RECEPTOR POSITIVE: ICD-10-CM

## 2024-01-29 DIAGNOSIS — Z17.0 MALIGNANT NEOPLASM OF UPPER-OUTER QUADRANT OF LEFT BREAST IN FEMALE, ESTROGEN RECEPTOR POSITIVE: ICD-10-CM

## 2024-01-30 RX ORDER — TAMOXIFEN CITRATE 20 MG/1
20 TABLET ORAL DAILY
Qty: 90 TABLET | Refills: 2 | Status: SHIPPED | OUTPATIENT
Start: 2024-01-30

## 2024-02-02 ENCOUNTER — TELEPHONE (OUTPATIENT)
Dept: HEMATOLOGY ONCOLOGY | Facility: CLINIC | Age: 44
End: 2024-02-02

## 2024-02-02 NOTE — TELEPHONE ENCOUNTER
"Received attached VM via teams:  \"Hello, this is Rashi Hilliard,  of [facility]. Her date of birth is [birth year]. I'm calling in regards to tamoxifen. I was unsure we were waiting for the embryos to be shipped over from [place] to [place] and we're still waiting from to get here, but she had held her tamoxifen. I feel that you should start the tamoxifen until the embryos are here. So I was wondering if that was even worth it, if that's something we should do, or if we should just stop tamoxifen altogether. Once again, the patient's name is Xiao TONY, last name Karen FISHER. Date of birth is [birth year]. My phone number is area code [phone]. Would appreciate some input. Thank you\"    Dr. Moon can you please advise.   "

## 2024-02-06 ENCOUNTER — TELEPHONE (OUTPATIENT)
Dept: HEMATOLOGY ONCOLOGY | Facility: CLINIC | Age: 44
End: 2024-02-06

## 2024-02-06 NOTE — TELEPHONE ENCOUNTER
Returned call to patients spouse.  Advised to continue to hold tamoxifen as per Dr. Moon. He expressed verbal understanding and was appreciative of return call

## 2024-02-06 NOTE — TELEPHONE ENCOUNTER
Patient Call    Who are you speaking with? Spouse    If it is not the patient, are they listed on an active communication consent form? Yes   What is the reason for this call? He asks if pt can resume taking tamoxifen   Does this require a call back? Yes   If a call back is required, please list best call back number [phone]    If a call back is required, advise that a message will be forwarded to their care team and someone will return their call as soon as possible.   Did you relay this information to the patient? Yes

## 2024-02-08 ENCOUNTER — NEW PROBLEM (OUTPATIENT)
Dept: URBAN - METROPOLITAN AREA CLINIC 6 | Facility: CLINIC | Age: 44
End: 2024-02-08

## 2024-02-08 DIAGNOSIS — H04.123: ICD-10-CM

## 2024-02-08 PROCEDURE — 92012 INTRM OPH EXAM EST PATIENT: CPT

## 2024-02-08 ASSESSMENT — VISUAL ACUITY
OD_CC: 20/20
OD_SC: 20/200
OS_CC: 20/20
OU_SC: J1+
OU_CC: J1+
OS_SC: 20/80

## 2024-02-08 ASSESSMENT — TONOMETRY
OS_IOP_MMHG: 14
OD_IOP_MMHG: 11

## 2024-10-22 NOTE — PRE-PROCEDURE INSTRUCTIONS
Patients daughter is requesting a call back at [phone], states Liane was in the ER and her other daughter was with her and she was told that Liane should wear a heart monitor. Corina did call her cardiologist and they told her because there is not note in the system from the ER department they are not willing to see her and are telling her to follow up with her PCP.  Liane did test positive for Covid on 10- and    nirmatrelvir/ritonavir 300/100 (PAXLOVID, 300/100, was sent into the pharmacy for her. Thank you.   Pre-Surgery Instructions:   Medication Instructions    escitalopram (LEXAPRO) 10 mg tablet Instructed patient per Anesthesia Guidelines   Pyridoxine HCl (VITAMIN B-6) 25 MG tablet Patient was instructed by Physician and understands  Pre op and bathing instructions reviewed   Pt will get hibiclens

## (undated) DEVICE — BIPOLAR CORD DISP

## (undated) DEVICE — INTENDED FOR TISSUE SEPARATION, AND OTHER PROCEDURES THAT REQUIRE A SHARP SURGICAL BLADE TO PUNCTURE OR CUT.: Brand: BARD-PARKER SAFETY BLADES SIZE 15, STERILE

## (undated) DEVICE — ASTOUND STANDARD SURGICAL GOWN, XL: Brand: CONVERTORS

## (undated) DEVICE — TUBING SUCTION 5MM X 12 FT

## (undated) DEVICE — TIBURON SPLIT SHEET: Brand: CONVERTORS

## (undated) DEVICE — BETHLEHEM UNIVERSAL MINOR GEN: Brand: CARDINAL HEALTH

## (undated) DEVICE — DRAPE EQUIPMENT RF WAND

## (undated) DEVICE — MARGIN MARKER SET

## (undated) DEVICE — VIAL DECANTER

## (undated) DEVICE — 3M™ STERI-STRIP™ REINFORCED ADHESIVE SKIN CLOSURES, R1542, 1/4 IN X 1-1/2 IN (6 MM X 38 MM), 6 STRIPS/ENVELOPE: Brand: 3M™ STERI-STRIP™

## (undated) DEVICE — PENCIL ELECTROSURG E-Z CLEAN -0035H

## (undated) DEVICE — SPONGE LAP 18 X 18 IN STRL RFD

## (undated) DEVICE — SUT VICRYL 3-0 SH 27 IN J416H

## (undated) DEVICE — DRAPE SHEET THREE QUARTER

## (undated) DEVICE — NEEDLE 27 G X 1 1/4

## (undated) DEVICE — DRAPE PROBE NEO-PROBE/ULTRASOUND

## (undated) DEVICE — INTENDED FOR TISSUE SEPARATION, AND OTHER PROCEDURES THAT REQUIRE A SHARP SURGICAL BLADE TO PUNCTURE OR CUT.: Brand: BARD-PARKER ® CARBON RIB-BACK BLADES

## (undated) DEVICE — LIGHT HANDLE COVER SLEEVE DISP BLUE STELLAR

## (undated) DEVICE — NEEDLE 25G X 1 1/2

## (undated) DEVICE — MEDI-VAC YANK SUCT HNDL W/TPRD BULBOUS TIP: Brand: CARDINAL HEALTH

## (undated) DEVICE — GLOVE SRG BIOGEL 6.5

## (undated) DEVICE — SUT MONOCRYL 4-0 PS-2 18 IN Y496G

## (undated) DEVICE — BOWL: 16OZ PEELPOUCH 75/CS 16/PLT: Brand: MEDEGEN MEDICAL PRODUCTS, LLC

## (undated) DEVICE — STRL COTTON TIP APPLCTR 6IN PK: Brand: CARDINAL HEALTH

## (undated) DEVICE — SYRINGE 1ML TB 26G X 3/8 SAFETY

## (undated) DEVICE — PROXIMATE SKIN STAPLERS (35 WIDE) CONTAINS 35 STAINLESS STEEL STAPLES (FIXED HEAD): Brand: PROXIMATE

## (undated) DEVICE — 3M™ STERI-STRIP™ REINFORCED ADHESIVE SKIN CLOSURES, R1547, 1/2 IN X 4 IN (12 MM X 100 MM), 6 STRIPS/ENVELOPE: Brand: 3M™ STERI-STRIP™

## (undated) DEVICE — ELECTRODE NEEDLE MEGAFINE 2IN E-Z CLEAN MEGADYNE -0118

## (undated) DEVICE — COTTON TIP APPLICTOR 2 PK

## (undated) DEVICE — CHLORAPREP HI-LITE 26ML ORANGE

## (undated) DEVICE — CORNEAL PROTECTOR ADULT

## (undated) DEVICE — ADHESIVE SKIN HIGH VISCOSITY EXOFIN 1ML

## (undated) DEVICE — PLASTIC ADHESIVE BANDAGE: Brand: CURITY

## (undated) DEVICE — SMOKE EVACUATION TUBING WITH 8 IN INTEGRAL WAND AND SPONGE GUARD: Brand: BUFFALO FILTER

## (undated) DEVICE — PLUMEPEN PRO 10FT

## (undated) DEVICE — GAUZE SPONGES,16 PLY: Brand: CURITY

## (undated) DEVICE — GLOVE SRG BIOGEL 7

## (undated) DEVICE — SKIN MARKER DUAL TIP WITH RULER CAP, FLEXIBLE RULER AND LABELS: Brand: DEVON

## (undated) DEVICE — MAGNETIC INSTRUMENT PAD 16" X 20"; LARGE; DISPOSABLE: Brand: CARDINAL HEALTH

## (undated) DEVICE — DRAPE INTESTINAL ISOLATION BAG

## (undated) DEVICE — BETHLEHEM UNIVERSAL OUTPATIENT: Brand: CARDINAL HEALTH

## (undated) DEVICE — PAD GROUNDING ADULT

## (undated) DEVICE — LUBRICANT SURGILUBE TUBE 4 OZ  FLIP TOP

## (undated) DEVICE — GLOVE INDICATOR PI UNDERGLOVE SZ 6.5 BLUE

## (undated) DEVICE — MINOR PROCEDURE DRAPE: Brand: CONVERTORS

## (undated) DEVICE — 3M™ TEGADERM™ TRANSPARENT FILM DRESSING FRAME STYLE, 1626W, 4 IN X 4-3/4 IN (10 CM X 12 CM), 50/CT 4CT/CASE: Brand: 3M™ TEGADERM™